# Patient Record
Sex: FEMALE | Race: WHITE | NOT HISPANIC OR LATINO | Employment: OTHER | URBAN - METROPOLITAN AREA
[De-identification: names, ages, dates, MRNs, and addresses within clinical notes are randomized per-mention and may not be internally consistent; named-entity substitution may affect disease eponyms.]

---

## 2018-01-18 ENCOUNTER — HOSPITAL ENCOUNTER (OUTPATIENT)
Dept: LAB | Facility: MEDICAL CENTER | Age: 78
End: 2018-01-18
Attending: FAMILY MEDICINE
Payer: COMMERCIAL

## 2018-01-18 LAB
ALBUMIN SERPL BCP-MCNC: 4.5 G/DL (ref 3.2–4.9)
ALBUMIN/GLOB SERPL: 1.3 G/DL
ALP SERPL-CCNC: 101 U/L (ref 30–99)
ALT SERPL-CCNC: 9 U/L (ref 2–50)
ANION GAP SERPL CALC-SCNC: 11 MMOL/L (ref 0–11.9)
AST SERPL-CCNC: 21 U/L (ref 12–45)
BASOPHILS # BLD AUTO: 0.5 % (ref 0–1.8)
BASOPHILS # BLD: 0.04 K/UL (ref 0–0.12)
BILIRUB SERPL-MCNC: 0.4 MG/DL (ref 0.1–1.5)
BUN SERPL-MCNC: 11 MG/DL (ref 8–22)
CALCIUM SERPL-MCNC: 8.9 MG/DL (ref 8.5–10.5)
CHLORIDE SERPL-SCNC: 103 MMOL/L (ref 96–112)
CHOLEST SERPL-MCNC: 298 MG/DL (ref 100–199)
CO2 SERPL-SCNC: 26 MMOL/L (ref 20–33)
CREAT SERPL-MCNC: 0.95 MG/DL (ref 0.5–1.4)
EOSINOPHIL # BLD AUTO: 0.35 K/UL (ref 0–0.51)
EOSINOPHIL NFR BLD: 4.8 % (ref 0–6.9)
ERYTHROCYTE [DISTWIDTH] IN BLOOD BY AUTOMATED COUNT: 42.7 FL (ref 35.9–50)
EST. AVERAGE GLUCOSE BLD GHB EST-MCNC: 108 MG/DL
GLOBULIN SER CALC-MCNC: 3.5 G/DL (ref 1.9–3.5)
GLUCOSE SERPL-MCNC: 126 MG/DL (ref 65–99)
HBA1C MFR BLD: 5.4 % (ref 0–5.6)
HCT VFR BLD AUTO: 39.8 % (ref 37–47)
HDLC SERPL-MCNC: 51 MG/DL
HGB BLD-MCNC: 13 G/DL (ref 12–16)
IMM GRANULOCYTES # BLD AUTO: 0.03 K/UL (ref 0–0.11)
IMM GRANULOCYTES NFR BLD AUTO: 0.4 % (ref 0–0.9)
LDLC SERPL CALC-MCNC: 217 MG/DL
LYMPHOCYTES # BLD AUTO: 1.65 K/UL (ref 1–4.8)
LYMPHOCYTES NFR BLD: 22.5 % (ref 22–41)
MCH RBC QN AUTO: 30.3 PG (ref 27–33)
MCHC RBC AUTO-ENTMCNC: 32.7 G/DL (ref 33.6–35)
MCV RBC AUTO: 92.8 FL (ref 81.4–97.8)
MONOCYTES # BLD AUTO: 0.63 K/UL (ref 0–0.85)
MONOCYTES NFR BLD AUTO: 8.6 % (ref 0–13.4)
NEUTROPHILS # BLD AUTO: 4.63 K/UL (ref 2–7.15)
NEUTROPHILS NFR BLD: 63.2 % (ref 44–72)
NRBC # BLD AUTO: 0 K/UL
NRBC BLD-RTO: 0 /100 WBC
PLATELET # BLD AUTO: 344 K/UL (ref 164–446)
PMV BLD AUTO: 9.1 FL (ref 9–12.9)
POTASSIUM SERPL-SCNC: 3.7 MMOL/L (ref 3.6–5.5)
PROT SERPL-MCNC: 8 G/DL (ref 6–8.2)
RBC # BLD AUTO: 4.29 M/UL (ref 4.2–5.4)
SODIUM SERPL-SCNC: 140 MMOL/L (ref 135–145)
TRIGL SERPL-MCNC: 152 MG/DL (ref 0–149)
TSH SERPL DL<=0.005 MIU/L-ACNC: 0.73 UIU/ML (ref 0.38–5.33)
WBC # BLD AUTO: 7.3 K/UL (ref 4.8–10.8)

## 2018-01-18 PROCEDURE — 80053 COMPREHEN METABOLIC PANEL: CPT

## 2018-01-18 PROCEDURE — 36415 COLL VENOUS BLD VENIPUNCTURE: CPT

## 2018-01-18 PROCEDURE — 85025 COMPLETE CBC W/AUTO DIFF WBC: CPT

## 2018-01-18 PROCEDURE — 84443 ASSAY THYROID STIM HORMONE: CPT

## 2018-01-18 PROCEDURE — 83036 HEMOGLOBIN GLYCOSYLATED A1C: CPT

## 2018-01-18 PROCEDURE — 80061 LIPID PANEL: CPT

## 2018-01-22 ENCOUNTER — OFFICE VISIT (OUTPATIENT)
Dept: URGENT CARE | Facility: CLINIC | Age: 78
End: 2018-01-22
Payer: COMMERCIAL

## 2018-01-22 ENCOUNTER — APPOINTMENT (OUTPATIENT)
Dept: RADIOLOGY | Facility: IMAGING CENTER | Age: 78
End: 2018-01-22
Attending: PHYSICIAN ASSISTANT
Payer: MEDICARE

## 2018-01-22 ENCOUNTER — HOSPITAL ENCOUNTER (INPATIENT)
Facility: MEDICAL CENTER | Age: 78
LOS: 12 days | DRG: 196 | End: 2018-02-03
Attending: EMERGENCY MEDICINE | Admitting: HOSPITALIST
Payer: COMMERCIAL

## 2018-01-22 ENCOUNTER — RESOLUTE PROFESSIONAL BILLING HOSPITAL PROF FEE (OUTPATIENT)
Dept: HOSPITALIST | Facility: MEDICAL CENTER | Age: 78
End: 2018-01-22
Payer: COMMERCIAL

## 2018-01-22 VITALS
BODY MASS INDEX: 26.87 KG/M2 | HEART RATE: 99 BPM | OXYGEN SATURATION: 90 % | TEMPERATURE: 98.3 F | SYSTOLIC BLOOD PRESSURE: 118 MMHG | DIASTOLIC BLOOD PRESSURE: 68 MMHG | WEIGHT: 146 LBS | HEIGHT: 62 IN | RESPIRATION RATE: 24 BRPM

## 2018-01-22 DIAGNOSIS — R06.2 WHEEZING: ICD-10-CM

## 2018-01-22 DIAGNOSIS — J18.9 PNEUMONIA OF BOTH LUNGS DUE TO INFECTIOUS ORGANISM, UNSPECIFIED PART OF LUNG: ICD-10-CM

## 2018-01-22 DIAGNOSIS — R06.02 SOB (SHORTNESS OF BREATH): ICD-10-CM

## 2018-01-22 DIAGNOSIS — M79.7 FIBROMYALGIA: Chronic | ICD-10-CM

## 2018-01-22 DIAGNOSIS — J96.01 ACUTE RESPIRATORY FAILURE WITH HYPOXIA (HCC): ICD-10-CM

## 2018-01-22 DIAGNOSIS — R05.9 COUGH: ICD-10-CM

## 2018-01-22 PROBLEM — E87.6 HYPOKALEMIA: Status: ACTIVE | Noted: 2018-01-22

## 2018-01-22 PROBLEM — D64.9 NORMOCHROMIC NORMOCYTIC ANEMIA: Status: ACTIVE | Noted: 2018-01-22

## 2018-01-22 LAB
ALBUMIN SERPL BCP-MCNC: 3.4 G/DL (ref 3.2–4.9)
ALBUMIN/GLOB SERPL: 0.9 G/DL
ALP SERPL-CCNC: 101 U/L (ref 30–99)
ALT SERPL-CCNC: 6 U/L (ref 2–50)
ANION GAP SERPL CALC-SCNC: 9 MMOL/L (ref 0–11.9)
APPEARANCE UR: ABNORMAL
AST SERPL-CCNC: 17 U/L (ref 12–45)
BACTERIA #/AREA URNS HPF: ABNORMAL /HPF
BASOPHILS # BLD AUTO: 0.3 % (ref 0–1.8)
BASOPHILS # BLD: 0.03 K/UL (ref 0–0.12)
BILIRUB SERPL-MCNC: 0.3 MG/DL (ref 0.1–1.5)
BILIRUB UR QL STRIP.AUTO: NEGATIVE
BNP SERPL-MCNC: 30 PG/ML (ref 0–100)
BUN SERPL-MCNC: 11 MG/DL (ref 8–22)
CALCIUM SERPL-MCNC: 8.9 MG/DL (ref 8.5–10.5)
CHLORIDE SERPL-SCNC: 104 MMOL/L (ref 96–112)
CO2 SERPL-SCNC: 26 MMOL/L (ref 20–33)
COLOR UR: YELLOW
CREAT SERPL-MCNC: 0.83 MG/DL (ref 0.5–1.4)
EKG IMPRESSION: NORMAL
EOSINOPHIL # BLD AUTO: 0.34 K/UL (ref 0–0.51)
EOSINOPHIL NFR BLD: 3.9 % (ref 0–6.9)
EPI CELLS #/AREA URNS HPF: NEGATIVE /HPF
ERYTHROCYTE [DISTWIDTH] IN BLOOD BY AUTOMATED COUNT: 41.5 FL (ref 35.9–50)
FLUAV RNA SPEC QL NAA+PROBE: NEGATIVE
FLUAV+FLUBV AG SPEC QL IA: NEGATIVE
FLUBV RNA SPEC QL NAA+PROBE: NEGATIVE
GLOBULIN SER CALC-MCNC: 3.9 G/DL (ref 1.9–3.5)
GLUCOSE SERPL-MCNC: 92 MG/DL (ref 65–99)
GLUCOSE UR STRIP.AUTO-MCNC: NEGATIVE MG/DL
HCT VFR BLD AUTO: 34.4 % (ref 37–47)
HGB BLD-MCNC: 11.3 G/DL (ref 12–16)
HYALINE CASTS #/AREA URNS LPF: ABNORMAL /LPF
IMM GRANULOCYTES # BLD AUTO: 0.02 K/UL (ref 0–0.11)
IMM GRANULOCYTES NFR BLD AUTO: 0.2 % (ref 0–0.9)
INT CON NEG: NEGATIVE
INT CON POS: POSITIVE
KETONES UR STRIP.AUTO-MCNC: NEGATIVE MG/DL
LACTATE BLD-SCNC: 1.3 MMOL/L (ref 0.5–2)
LEUKOCYTE ESTERASE UR QL STRIP.AUTO: ABNORMAL
LYMPHOCYTES # BLD AUTO: 1.17 K/UL (ref 1–4.8)
LYMPHOCYTES NFR BLD: 13.4 % (ref 22–41)
MAGNESIUM SERPL-MCNC: 1.8 MG/DL (ref 1.5–2.5)
MCH RBC QN AUTO: 30.1 PG (ref 27–33)
MCHC RBC AUTO-ENTMCNC: 32.8 G/DL (ref 33.6–35)
MCV RBC AUTO: 91.7 FL (ref 81.4–97.8)
MICRO URNS: ABNORMAL
MONOCYTES # BLD AUTO: 0.58 K/UL (ref 0–0.85)
MONOCYTES NFR BLD AUTO: 6.6 % (ref 0–13.4)
NEUTROPHILS # BLD AUTO: 6.61 K/UL (ref 2–7.15)
NEUTROPHILS NFR BLD: 75.6 % (ref 44–72)
NITRITE UR QL STRIP.AUTO: NEGATIVE
NRBC # BLD AUTO: 0 K/UL
NRBC BLD-RTO: 0 /100 WBC
PH UR STRIP.AUTO: 5 [PH]
PHOSPHATE SERPL-MCNC: 3 MG/DL (ref 2.5–4.5)
PLATELET # BLD AUTO: 304 K/UL (ref 164–446)
PMV BLD AUTO: 8.7 FL (ref 9–12.9)
POTASSIUM SERPL-SCNC: 3.4 MMOL/L (ref 3.6–5.5)
PROT SERPL-MCNC: 7.3 G/DL (ref 6–8.2)
PROT UR QL STRIP: NEGATIVE MG/DL
RBC # BLD AUTO: 3.75 M/UL (ref 4.2–5.4)
RBC # URNS HPF: ABNORMAL /HPF
RBC UR QL AUTO: NEGATIVE
SODIUM SERPL-SCNC: 139 MMOL/L (ref 135–145)
SP GR UR STRIP.AUTO: 1.02
TROPONIN I SERPL-MCNC: <0.01 NG/ML (ref 0–0.04)
UROBILINOGEN UR STRIP.AUTO-MCNC: 0.2 MG/DL
WBC # BLD AUTO: 8.8 K/UL (ref 4.8–10.8)
WBC #/AREA URNS HPF: ABNORMAL /HPF

## 2018-01-22 PROCEDURE — 96374 THER/PROPH/DIAG INJ IV PUSH: CPT

## 2018-01-22 PROCEDURE — 93005 ELECTROCARDIOGRAM TRACING: CPT | Performed by: EMERGENCY MEDICINE

## 2018-01-22 PROCEDURE — 304561 HCHG STAT O2

## 2018-01-22 PROCEDURE — 700111 HCHG RX REV CODE 636 W/ 250 OVERRIDE (IP): Performed by: EMERGENCY MEDICINE

## 2018-01-22 PROCEDURE — 770020 HCHG ROOM/CARE - TELE (206)

## 2018-01-22 PROCEDURE — 94640 AIRWAY INHALATION TREATMENT: CPT

## 2018-01-22 PROCEDURE — 83880 ASSAY OF NATRIURETIC PEPTIDE: CPT

## 2018-01-22 PROCEDURE — 87804 INFLUENZA ASSAY W/OPTIC: CPT | Performed by: PHYSICIAN ASSISTANT

## 2018-01-22 PROCEDURE — 700101 HCHG RX REV CODE 250: Performed by: EMERGENCY MEDICINE

## 2018-01-22 PROCEDURE — 36415 COLL VENOUS BLD VENIPUNCTURE: CPT

## 2018-01-22 PROCEDURE — 83605 ASSAY OF LACTIC ACID: CPT

## 2018-01-22 PROCEDURE — 80053 COMPREHEN METABOLIC PANEL: CPT

## 2018-01-22 PROCEDURE — 87502 INFLUENZA DNA AMP PROBE: CPT

## 2018-01-22 PROCEDURE — 700102 HCHG RX REV CODE 250 W/ 637 OVERRIDE(OP): Performed by: EMERGENCY MEDICINE

## 2018-01-22 PROCEDURE — 84100 ASSAY OF PHOSPHORUS: CPT

## 2018-01-22 PROCEDURE — 81001 URINALYSIS AUTO W/SCOPE: CPT

## 2018-01-22 PROCEDURE — 94640 AIRWAY INHALATION TREATMENT: CPT | Performed by: PHYSICIAN ASSISTANT

## 2018-01-22 PROCEDURE — 87086 URINE CULTURE/COLONY COUNT: CPT

## 2018-01-22 PROCEDURE — 99285 EMERGENCY DEPT VISIT HI MDM: CPT

## 2018-01-22 PROCEDURE — 71046 X-RAY EXAM CHEST 2 VIEWS: CPT | Mod: TC | Performed by: PHYSICIAN ASSISTANT

## 2018-01-22 PROCEDURE — 83735 ASSAY OF MAGNESIUM: CPT

## 2018-01-22 PROCEDURE — 99223 1ST HOSP IP/OBS HIGH 75: CPT | Performed by: HOSPITALIST

## 2018-01-22 PROCEDURE — A9270 NON-COVERED ITEM OR SERVICE: HCPCS | Performed by: EMERGENCY MEDICINE

## 2018-01-22 PROCEDURE — 700111 HCHG RX REV CODE 636 W/ 250 OVERRIDE (IP): Performed by: HOSPITALIST

## 2018-01-22 PROCEDURE — 93005 ELECTROCARDIOGRAM TRACING: CPT

## 2018-01-22 PROCEDURE — 700105 HCHG RX REV CODE 258: Performed by: HOSPITALIST

## 2018-01-22 PROCEDURE — 87040 BLOOD CULTURE FOR BACTERIA: CPT

## 2018-01-22 PROCEDURE — 84484 ASSAY OF TROPONIN QUANT: CPT

## 2018-01-22 PROCEDURE — 99215 OFFICE O/P EST HI 40 MIN: CPT | Mod: 25 | Performed by: PHYSICIAN ASSISTANT

## 2018-01-22 PROCEDURE — 700102 HCHG RX REV CODE 250 W/ 637 OVERRIDE(OP): Performed by: HOSPITALIST

## 2018-01-22 PROCEDURE — 85025 COMPLETE CBC W/AUTO DIFF WBC: CPT

## 2018-01-22 PROCEDURE — A9270 NON-COVERED ITEM OR SERVICE: HCPCS | Performed by: HOSPITALIST

## 2018-01-22 RX ORDER — AMITRIPTYLINE HYDROCHLORIDE 150 MG/1
150 TABLET ORAL
COMMUNITY

## 2018-01-22 RX ORDER — IPRATROPIUM BROMIDE AND ALBUTEROL SULFATE 2.5; .5 MG/3ML; MG/3ML
3 SOLUTION RESPIRATORY (INHALATION) ONCE
Status: COMPLETED | OUTPATIENT
Start: 2018-01-22 | End: 2018-01-22

## 2018-01-22 RX ORDER — AMITRIPTYLINE HYDROCHLORIDE 50 MG/1
150 TABLET, FILM COATED ORAL
Status: DISCONTINUED | OUTPATIENT
Start: 2018-01-22 | End: 2018-01-26

## 2018-01-22 RX ORDER — LISINOPRIL 5 MG/1
5 TABLET ORAL DAILY
Status: DISCONTINUED | OUTPATIENT
Start: 2018-01-23 | End: 2018-01-25

## 2018-01-22 RX ORDER — LEVOFLOXACIN 5 MG/ML
750 INJECTION, SOLUTION INTRAVENOUS
Status: DISCONTINUED | OUTPATIENT
Start: 2018-01-22 | End: 2018-01-23

## 2018-01-22 RX ORDER — METHYLPREDNISOLONE SODIUM SUCCINATE 125 MG/2ML
125 INJECTION, POWDER, LYOPHILIZED, FOR SOLUTION INTRAMUSCULAR; INTRAVENOUS ONCE
Status: COMPLETED | OUTPATIENT
Start: 2018-01-22 | End: 2018-01-22

## 2018-01-22 RX ORDER — AMOXICILLIN 250 MG
2 CAPSULE ORAL 2 TIMES DAILY
Status: DISCONTINUED | OUTPATIENT
Start: 2018-01-22 | End: 2018-02-03 | Stop reason: HOSPADM

## 2018-01-22 RX ORDER — LEVOTHYROXINE SODIUM 0.03 MG/1
25 TABLET ORAL
Status: DISCONTINUED | OUTPATIENT
Start: 2018-01-23 | End: 2018-02-03 | Stop reason: HOSPADM

## 2018-01-22 RX ORDER — PREGABALIN 150 MG/1
150 CAPSULE ORAL DAILY
COMMUNITY

## 2018-01-22 RX ORDER — FLUOXETINE HYDROCHLORIDE 20 MG/1
20 CAPSULE ORAL DAILY
Status: DISCONTINUED | OUTPATIENT
Start: 2018-01-23 | End: 2018-02-03 | Stop reason: HOSPADM

## 2018-01-22 RX ORDER — POLYETHYLENE GLYCOL 3350 17 G/17G
1 POWDER, FOR SOLUTION ORAL
Status: DISCONTINUED | OUTPATIENT
Start: 2018-01-22 | End: 2018-02-03 | Stop reason: HOSPADM

## 2018-01-22 RX ORDER — CARVEDILOL 25 MG/1
25 TABLET ORAL DAILY
Status: ON HOLD | COMMUNITY
End: 2018-02-03

## 2018-01-22 RX ORDER — ZOLPIDEM TARTRATE 5 MG/1
5 TABLET ORAL
Status: DISCONTINUED | OUTPATIENT
Start: 2018-01-22 | End: 2018-01-25

## 2018-01-22 RX ORDER — LEVOTHYROXINE SODIUM 0.03 MG/1
25 TABLET ORAL
COMMUNITY

## 2018-01-22 RX ORDER — ONDANSETRON 4 MG/1
4 TABLET, ORALLY DISINTEGRATING ORAL EVERY 4 HOURS PRN
Status: DISCONTINUED | OUTPATIENT
Start: 2018-01-22 | End: 2018-02-03 | Stop reason: HOSPADM

## 2018-01-22 RX ORDER — CARVEDILOL 25 MG/1
25 TABLET ORAL DAILY
Status: DISCONTINUED | OUTPATIENT
Start: 2018-01-23 | End: 2018-01-25

## 2018-01-22 RX ORDER — IPRATROPIUM BROMIDE AND ALBUTEROL SULFATE 2.5; .5 MG/3ML; MG/3ML
3 SOLUTION RESPIRATORY (INHALATION)
Status: COMPLETED | OUTPATIENT
Start: 2018-01-22 | End: 2018-01-22

## 2018-01-22 RX ORDER — SODIUM CHLORIDE 9 MG/ML
INJECTION, SOLUTION INTRAVENOUS CONTINUOUS
Status: DISCONTINUED | OUTPATIENT
Start: 2018-01-22 | End: 2018-01-24

## 2018-01-22 RX ORDER — ONDANSETRON 2 MG/ML
4 INJECTION INTRAMUSCULAR; INTRAVENOUS EVERY 4 HOURS PRN
Status: DISCONTINUED | OUTPATIENT
Start: 2018-01-22 | End: 2018-02-03 | Stop reason: HOSPADM

## 2018-01-22 RX ORDER — OXYCODONE AND ACETAMINOPHEN 10; 325 MG/1; MG/1
1 TABLET ORAL ONCE
Status: COMPLETED | OUTPATIENT
Start: 2018-01-22 | End: 2018-01-22

## 2018-01-22 RX ORDER — ACETAMINOPHEN 325 MG/1
650 TABLET ORAL EVERY 6 HOURS PRN
Status: DISCONTINUED | OUTPATIENT
Start: 2018-01-22 | End: 2018-02-03 | Stop reason: HOSPADM

## 2018-01-22 RX ORDER — OXYCODONE AND ACETAMINOPHEN 10; 325 MG/1; MG/1
1-2 TABLET ORAL EVERY 4 HOURS PRN
Status: DISCONTINUED | OUTPATIENT
Start: 2018-01-22 | End: 2018-01-25

## 2018-01-22 RX ORDER — HYDROCHLOROTHIAZIDE 25 MG/1
25 TABLET ORAL DAILY
Status: DISCONTINUED | OUTPATIENT
Start: 2018-01-23 | End: 2018-01-25

## 2018-01-22 RX ORDER — BISACODYL 10 MG
10 SUPPOSITORY, RECTAL RECTAL
Status: DISCONTINUED | OUTPATIENT
Start: 2018-01-22 | End: 2018-02-03 | Stop reason: HOSPADM

## 2018-01-22 RX ORDER — LABETALOL HYDROCHLORIDE 5 MG/ML
10 INJECTION, SOLUTION INTRAVENOUS EVERY 4 HOURS PRN
Status: DISCONTINUED | OUTPATIENT
Start: 2018-01-22 | End: 2018-02-03 | Stop reason: HOSPADM

## 2018-01-22 RX ORDER — POTASSIUM CHLORIDE 20 MEQ/1
20 TABLET, EXTENDED RELEASE ORAL 2 TIMES DAILY
Status: DISCONTINUED | OUTPATIENT
Start: 2018-01-22 | End: 2018-02-03 | Stop reason: HOSPADM

## 2018-01-22 RX ORDER — PREGABALIN 150 MG/1
150 CAPSULE ORAL EVERY EVENING
Status: DISCONTINUED | OUTPATIENT
Start: 2018-01-22 | End: 2018-02-03 | Stop reason: HOSPADM

## 2018-01-22 RX ADMIN — STANDARDIZED SENNA CONCENTRATE AND DOCUSATE SODIUM 2 TABLET: 8.6; 5 TABLET, FILM COATED ORAL at 21:59

## 2018-01-22 RX ADMIN — OXYCODONE HYDROCHLORIDE AND ACETAMINOPHEN 1 TABLET: 10; 325 TABLET ORAL at 17:50

## 2018-01-22 RX ADMIN — SODIUM CHLORIDE: 9 INJECTION, SOLUTION INTRAVENOUS at 22:01

## 2018-01-22 RX ADMIN — ZOLPIDEM TARTRATE 5 MG: 5 TABLET, FILM COATED ORAL at 23:07

## 2018-01-22 RX ADMIN — POTASSIUM CHLORIDE 20 MEQ: 1500 TABLET, EXTENDED RELEASE ORAL at 21:59

## 2018-01-22 RX ADMIN — IPRATROPIUM BROMIDE AND ALBUTEROL SULFATE 3 ML: .5; 3 SOLUTION RESPIRATORY (INHALATION) at 17:05

## 2018-01-22 RX ADMIN — PREGABALIN 150 MG: 150 CAPSULE ORAL at 21:58

## 2018-01-22 RX ADMIN — ACETAMINOPHEN 650 MG: 325 TABLET, FILM COATED ORAL at 23:07

## 2018-01-22 RX ADMIN — IPRATROPIUM BROMIDE AND ALBUTEROL SULFATE 3 ML: 2.5; .5 SOLUTION RESPIRATORY (INHALATION) at 12:56

## 2018-01-22 RX ADMIN — METHYLPREDNISOLONE SODIUM SUCCINATE 125 MG: 125 INJECTION, POWDER, FOR SOLUTION INTRAMUSCULAR; INTRAVENOUS at 17:52

## 2018-01-22 RX ADMIN — AMITRIPTYLINE HYDROCHLORIDE 150 MG: 50 TABLET, FILM COATED ORAL at 22:29

## 2018-01-22 RX ADMIN — LEVOFLOXACIN 750 MG: 750 INJECTION, SOLUTION INTRAVENOUS at 23:06

## 2018-01-22 ASSESSMENT — ENCOUNTER SYMPTOMS
DOUBLE VISION: 0
CARDIOVASCULAR NEGATIVE: 1
VOMITING: 0
SPUTUM PRODUCTION: 0
WEAKNESS: 1
EYE REDNESS: 0
CONSTIPATION: 0
SHORTNESS OF BREATH: 1
SORE THROAT: 0
COUGH: 1
HEADACHES: 0
HEADACHES: 0
NECK PAIN: 0
CHILLS: 1
MYALGIAS: 1
BLOOD IN STOOL: 0
WHEEZING: 0
ABDOMINAL PAIN: 0
TINGLING: 0
RHINORRHEA: 0
SHORTNESS OF BREATH: 1
HEARTBURN: 0
DIZZINESS: 0
COUGH: 1
HEMOPTYSIS: 0
MYALGIAS: 1
BACK PAIN: 1
WHEEZING: 1
NAUSEA: 0
GASTROINTESTINAL NEGATIVE: 1
EYES NEGATIVE: 1
NERVOUS/ANXIOUS: 0
PALPITATIONS: 0
PSYCHIATRIC NEGATIVE: 1
ABDOMINAL PAIN: 0
FALLS: 0
FEVER: 0
FOCAL WEAKNESS: 0
DIAPHORESIS: 0
VOMITING: 0
LOSS OF CONSCIOUSNESS: 0
SEIZURES: 0
DIARRHEA: 0
CHILLS: 1
DIARRHEA: 0
FEVER: 0
EYE DISCHARGE: 0
BRUISES/BLEEDS EASILY: 0
DIZZINESS: 0

## 2018-01-22 ASSESSMENT — COPD QUESTIONNAIRES
HAVE YOU SMOKED AT LEAST 100 CIGARETTES IN YOUR ENTIRE LIFE: NO/DON'T KNOW
DURING THE PAST 4 WEEKS HOW MUCH DID YOU FEEL SHORT OF BREATH: NONE/LITTLE OF THE TIME
COPD: 0
DO YOU EVER COUGH UP ANY MUCUS OR PHLEGM?: NO/ONLY WITH OCCASIONAL COLDS OR INFECTIONS
HAVE YOU SMOKED AT LEAST 100 CIGARETTES IN YOUR ENTIRE LIFE: NO/DON'T KNOW
DURING THE PAST 4 WEEKS HOW MUCH DID YOU FEEL SHORT OF BREATH: SOME OF THE TIME
COPD SCREENING SCORE: 3
COPD SCREENING SCORE: 2
DO YOU EVER COUGH UP ANY MUCUS OR PHLEGM?: NO/ONLY WITH OCCASIONAL COLDS OR INFECTIONS

## 2018-01-22 ASSESSMENT — PATIENT HEALTH QUESTIONNAIRE - PHQ9
4. FEELING TIRED OR HAVING LITTLE ENERGY: NEARLY EVERY DAY
8. MOVING OR SPEAKING SO SLOWLY THAT OTHER PEOPLE COULD HAVE NOTICED. OR THE OPPOSITE, BEING SO FIGETY OR RESTLESS THAT YOU HAVE BEEN MOVING AROUND A LOT MORE THAN USUAL: NOT AT ALL
SUM OF ALL RESPONSES TO PHQ QUESTIONS 1-9: 7
3. TROUBLE FALLING OR STAYING ASLEEP OR SLEEPING TOO MUCH: SEVERAL DAYS
2. FEELING DOWN, DEPRESSED, IRRITABLE, OR HOPELESS: MORE THAN HALF THE DAYS
6. FEELING BAD ABOUT YOURSELF - OR THAT YOU ARE A FAILURE OR HAVE LET YOURSELF OR YOUR FAMILY DOWN: NOT AL ALL
7. TROUBLE CONCENTRATING ON THINGS, SUCH AS READING THE NEWSPAPER OR WATCHING TELEVISION: NOT AT ALL
SUM OF ALL RESPONSES TO PHQ9 QUESTIONS 1 AND 2: 2
5. POOR APPETITE OR OVEREATING: SEVERAL DAYS
9. THOUGHTS THAT YOU WOULD BE BETTER OFF DEAD, OR OF HURTING YOURSELF: NOT AT ALL
1. LITTLE INTEREST OR PLEASURE IN DOING THINGS: NOT AT ALL

## 2018-01-22 ASSESSMENT — LIFESTYLE VARIABLES
ALCOHOL_USE: NO
EVER_SMOKED: NEVER

## 2018-01-22 ASSESSMENT — PAIN SCALES - GENERAL
PAINLEVEL_OUTOF10: 9
PAINLEVEL_OUTOF10: 0

## 2018-01-22 NOTE — ED NOTES
Pt presents to ED from  for c/o a cough. Per pt they think she has pneumonia on both sides. Pt reports a cough with pain since last night.

## 2018-01-22 NOTE — ED PROVIDER NOTES
"ED Provider Note    Scribed for Jhon Barber M.D. by Jenniffer Campos. 1/22/2018  3:55 PM    Primary care provider: Paramjit Akbar M.D.  Means of arrival: walk in   History obtained from: patient   History limited by: none     CHIEF COMPLAINT  Chief Complaint   Patient presents with   • Chest Pressure   • Sent from Urgent Care       HPI  Ade Hermosillo is a 77 y.o. female who presents to the Emergency Department with complaints of worsening shortness of breath onset 2 weeks ago. Patient reports associated cough onset last night, wheezing, and chest pain. Her chest pain is located in her epigastric area. She describes the quantity of her chest pain as \"burning in her lungs\". Her pain is exacerbated by taking deep breaths. Patient denies abdominal pain and leg pain or swelling. She denies a history of DVT or PE's. She denies a history of cancers. She does not remember whether or not she has received her flu shot this season. Patient reports that she presented to the urgent care last night secondary to worsening shortness of breath. Patient reports receiving a breathing treatment with relief minimal of her symptoms. Patient was sent to the ER from urgent care for further evaluation of her symptoms. She denies any other pertinent medical history.     REVIEW OF SYSTEMS  Pertinent positives include shortness of breath, chest pain, wheezing, and cough. Pertinent negatives include no abdominal pain, leg pain or swelling.  All other systems reviewed and negative. C    PAST MEDICAL HISTORY   has a past medical history of Anxiety; Hypertension; and Thyroid disease.    SURGICAL HISTORY   has a past surgical history that includes abdominal hysterectomy total; appendectomy; and hysterectomy laparoscopy.    SOCIAL HISTORY  Social History   Substance Use Topics   • Smoking status: Never Smoker   • Smokeless tobacco: Never Used   • Alcohol use No      History   Drug Use No       FAMILY HISTORY  Family History   Problem " Relation Age of Onset   • Cancer Mother    • Alcohol/Drug Father        CURRENT MEDICATIONS  Home Medications     Reviewed by Tami Alonzo (Pharmacy Tech) on 01/22/18 at 1921  Med List Status: Complete   Medication Last Dose Status   amitriptyline (ELAVIL) 150 MG Tab 1/21/2018 Active   carvedilol (COREG) 25 MG Tab 1/22/2018 Active   fluoxetine (PROZAC) 20 MG CAPS 1/22/2018 Active   hydrochlorothiazide (HYDRODIURIL) 25 MG TABS 1/22/2018 Active   levothyroxine (SYNTHROID) 25 MCG Tab 1/22/2018 Active   lisinopril (PRINIVIL) 5 MG TABS 1/22/2018 Active   Oxycodone-Acetaminophen (PERCOCET-10)  MG Tab 1/22/2018 Active   pregabalin (LYRICA) 150 MG Cap 1/21/2018 Active                ALLERGIES  Allergies   Allergen Reactions   • Amoxicillin Anaphylaxis     Stop breathing   • Pcn [Penicillins] Anaphylaxis     Stop breathing       PHYSICAL EXAM  VITAL SIGNS: /66   Pulse 96   Temp 37.9 °C (100.3 °F)   Resp 16   Wt 63.5 kg (140 lb)   SpO2 98%   BMI 25.61 kg/m²     Constitutional: Well developed, Well nourished, Non-toxic appearance.   HENT: Normocephalic, Atraumatic, Bilateral external ears normal, Oropharynx moist, No oral exudates.   Eyes: PERRLA, EOMI, Conjunctiva normal, No discharge.   Neck: No tenderness, Supple, No stridor.   Lymphatic: No lymphadenopathy noted.   Cardiovascular: Normal heart rate, Normal rhythm.   Thorax & Lungs: Diffuse crackles in bilateral bases.   Abdomen: Soft, No tenderness, No masses, No pulsatile masses.   Skin: Warm, Dry, No erythema, No rash.   Extremities:, No edema No cyanosis.   Musculoskeletal: No tenderness to palpation or major deformities noted. Intact distal pulses  Neurologic: Awake, alert. Moves all extremities spontaneously.  Psychiatric: Affect normal, Judgment normal, Mood normal.     LABS  Results for orders placed or performed during the hospital encounter of 01/22/18   LACTIC ACID   Result Value Ref Range    Lactic Acid 1.3 0.5 - 2.0 mmol/L   CBC WITH  DIFFERENTIAL   Result Value Ref Range    WBC 8.8 4.8 - 10.8 K/uL    RBC 3.75 (L) 4.20 - 5.40 M/uL    Hemoglobin 11.3 (L) 12.0 - 16.0 g/dL    Hematocrit 34.4 (L) 37.0 - 47.0 %    MCV 91.7 81.4 - 97.8 fL    MCH 30.1 27.0 - 33.0 pg    MCHC 32.8 (L) 33.6 - 35.0 g/dL    RDW 41.5 35.9 - 50.0 fL    Platelet Count 304 164 - 446 K/uL    MPV 8.7 (L) 9.0 - 12.9 fL    Neutrophils-Polys 75.60 (H) 44.00 - 72.00 %    Lymphocytes 13.40 (L) 22.00 - 41.00 %    Monocytes 6.60 0.00 - 13.40 %    Eosinophils 3.90 0.00 - 6.90 %    Basophils 0.30 0.00 - 1.80 %    Immature Granulocytes 0.20 0.00 - 0.90 %    Nucleated RBC 0.00 /100 WBC    Neutrophils (Absolute) 6.61 2.00 - 7.15 K/uL    Lymphs (Absolute) 1.17 1.00 - 4.80 K/uL    Monos (Absolute) 0.58 0.00 - 0.85 K/uL    Eos (Absolute) 0.34 0.00 - 0.51 K/uL    Baso (Absolute) 0.03 0.00 - 0.12 K/uL    Immature Granulocytes (abs) 0.02 0.00 - 0.11 K/uL    NRBC (Absolute) 0.00 K/uL   COMP METABOLIC PANEL   Result Value Ref Range    Sodium 139 135 - 145 mmol/L    Potassium 3.4 (L) 3.6 - 5.5 mmol/L    Chloride 104 96 - 112 mmol/L    Co2 26 20 - 33 mmol/L    Anion Gap 9.0 0.0 - 11.9    Glucose 92 65 - 99 mg/dL    Bun 11 8 - 22 mg/dL    Creatinine 0.83 0.50 - 1.40 mg/dL    Calcium 8.9 8.5 - 10.5 mg/dL    AST(SGOT) 17 12 - 45 U/L    ALT(SGPT) 6 2 - 50 U/L    Alkaline Phosphatase 101 (H) 30 - 99 U/L    Total Bilirubin 0.3 0.1 - 1.5 mg/dL    Albumin 3.4 3.2 - 4.9 g/dL    Total Protein 7.3 6.0 - 8.2 g/dL    Globulin 3.9 (H) 1.9 - 3.5 g/dL    A-G Ratio 0.9 g/dL   TROPONIN   Result Value Ref Range    Troponin I <0.01 0.00 - 0.04 ng/mL   BTYPE NATRIURETIC PEPTIDE   Result Value Ref Range    B Natriuretic Peptide 30 0 - 100 pg/mL   MAGNESIUM   Result Value Ref Range    Magnesium 1.8 1.5 - 2.5 mg/dL   PHOSPHORUS   Result Value Ref Range    Phosphorus 3.0 2.5 - 4.5 mg/dL   ESTIMATED GFR   Result Value Ref Range    GFR If African American >60 >60 mL/min/1.73 m 2    GFR If Non African American >60 >60 mL/min/1.73  m 2   INFLUENZA A/B BY PCR   Result Value Ref Range    Influenza virus A RNA Negative Negative    Influenza virus B, PCR Negative Negative   EKG (NOW)   Result Value Ref Range    Report       [facility] Emergency Dept.    Test Date:  2018  Pt Name:    JENELLE CASTRO                 Department: ER  MRN:        [id number]                      Room:  Gender:     Female                       Technician: [id number]  :        [id number]                   Requested By:ER TRIAGE PROTOCOL  Order #:    [id number]                    Reading MD:    Measurements  Intervals                                Axis  Rate:       91                           P:          23  ME:         172                          QRS:        -38  QRSD:       98                           T:          149  QT:         359  QTc:        442    Interpretive Statements  SINUS RHYTHM  LEFT AXIS DEVIATION  LATE PRECORDIAL R/S TRANSITION  LVH WITH SECONDARY REPOLARIZATION ABNORMALITY  No previous ECG available for comparison       All labs reviewed by me.    EKG  Reviewed by me as shown above.     COURSE & MEDICAL DECISION MAKING  Pertinent Labs & Imaging studies reviewed. (See chart for details)    I reviewed the patient's medical records which showed the patient went to urgent care today for evaluation of a cough and shortness of breath. Her O2 saturations were 90%. There were wheezes throughout on exam. Chest x-ray showed bilateral pneumonia.     3:55 PM - Patient seen and examined at bedside. Patient will be treated with Solu-Medrol 125 mg, Duoneb 3 ml. Ordered EKG, lactic acid, estimated GFR, BNP, magnesium, phosphorus, influenza rapid, by PCR, A/B, oxygen therapy, cardiac monitoring, CBC, CMP, urinalysis, urine culture, blood culture to evaluate her symptoms. The differential diagnoses include but are not limited to: pneumonia, influenza, bronchitis, CHF. Discussed the patient's chest x-ray results and informed her that she most likely has  bilateral pneumonia. Informed her that she needs to be admitted to the hospital for further observation. She understands and agrees to the plan of care.     6:02 PM- Reviewed the patient's lab results.     6:17 PM- Spoke with Dr. Alan (hospitalist) who agrees to admit the patient.     Decision Making:  Patient with URI Symptoms, diffuse wheezing, x-ray that shows bilateral pneumonia. The patient's influenza was negative, given the patient breathing treatment, steroids, antibiotics, discussed the case with the hospitalist for admission to hospital.    DISPOSITION:  Patient will be admitted to Dr. Alan (hospitalist) in guarded condition.    FINAL IMPRESSION  1. Pneumonia of both lungs due to infectious organism, unspecified part of lung    2. Wheezing          IJenniffer (Scribe), am scribing for, and in the presence of, Jhon Barber M.D..    Electronically signed by: Jenniffer Campos (Scribe), 1/22/2018    IJhon M.D. personally performed the services described in this documentation, as scribed by Jenniffer Campos in my presence, and it is both accurate and complete.    The note accurately reflects work and decisions made by me.  Jhon Barber  1/22/2018  7:50 PM

## 2018-01-22 NOTE — PROGRESS NOTES
Subjective:      Ade Hermosillo is a 77 y.o. female who presents with Cough (2 days); Shortness of Breath; and Chest Pressure            Patient is a 77-year-old female who presents with dry cough, shortness of breath and wheezing for the last 3 days. Patient became concerned last night when she started having chest tightness and body aches all over. She denies any fevers however chronically feels chilled. She denies any history of prior lung problems of asthma or COPD.      Cough   This is a new problem. Episode onset: 3 days ago. The problem has been gradually worsening. The problem occurs every few minutes. The cough is non-productive. Associated symptoms include chills, myalgias, shortness of breath and wheezing. Pertinent negatives include no chest pain, ear pain, eye redness, fever, headaches, nasal congestion, postnasal drip, rash, rhinorrhea or sore throat. Nothing aggravates the symptoms. She has tried nothing for the symptoms. There is no history of asthma, bronchitis, COPD or pneumonia.   Shortness of Breath   Associated symptoms include wheezing. Pertinent negatives include no abdominal pain, chest pain, ear pain, fever, headaches, leg swelling, neck pain, rash, rhinorrhea, sore throat, sputum production or vomiting. There is no history of asthma, COPD or pneumonia.       Review of Systems   Constitutional: Positive for chills and malaise/fatigue. Negative for fever.   HENT: Negative for congestion, ear discharge, ear pain, postnasal drip, rhinorrhea and sore throat.    Eyes: Negative for discharge and redness.   Respiratory: Positive for cough, shortness of breath and wheezing. Negative for sputum production.         Positive for chest tightness   Cardiovascular: Negative for chest pain and leg swelling.   Gastrointestinal: Negative for abdominal pain, diarrhea and vomiting.   Genitourinary: Negative for dysuria and urgency.   Musculoskeletal: Positive for back pain and myalgias. Negative for falls  "and neck pain.   Skin: Negative for itching and rash.   Neurological: Negative for dizziness, tingling and headaches.          Objective:     /68   Pulse 99   Temp 36.8 °C (98.3 °F)   Resp (!) 24   Ht 1.575 m (5' 2\")   Wt 66.2 kg (146 lb)   SpO2 90%   BMI 26.70 kg/m²    PMH:  has a past medical history of Anxiety; Hypertension; and Thyroid disease.  MEDS:   Current Outpatient Prescriptions:   •  Oxycodone-Acetaminophen (PERCOCET-10)  MG Tab, Take 1-2 Tabs by mouth every four hours as needed for Severe Pain., Disp: , Rfl:   •  levothyroxine (SYNTHROID) 25 MCG TABS, Take 1 Tab by mouth every day., Disp: 90 Tab, Rfl: 3  •  lisinopril (PRINIVIL) 5 MG TABS, Take 1 Tab by mouth every day., Disp: 90 Tab, Rfl: 3  •  Pregabalin (LYRICA PO), Take  by mouth.  , Disp: , Rfl:   •  MethylPREDNISolone (MEDROL DOSEPAK) 4 MG Tablet Therapy Pack, U.D. [start in 2 days], Disp: 1 Tab, Rfl: 0  •  aspirin (ASA) 325 MG Tab, 1 PO in Urg Care, Disp: 1 Tab, Rfl: 0  •  AMITRIPTYLINE HCL PO, Take  by mouth., Disp: , Rfl:   •  methylPREDNISolone (MEDROL) 4 MG Tab, Take 4 mg by mouth every day., Disp: , Rfl:   •  azithromycin (ZITHROMAX) 250 MG TABS, 1 po qd, Disp: 10 Tab, Rfl: 0  •  carvedilol (COREG) 25 MG TABS, Take 1 Tab by mouth 2 times a day, with meals., Disp: 180 Tab, Rfl: 3  •  hydrochlorothiazide (HYDRODIURIL) 25 MG TABS, Take 1 Tab by mouth every day., Disp: 90 Tab, Rfl: 3  •  pravastatin (PRAVACHOL) 40 MG tablet, Take 1 Tab by mouth every day., Disp: 30 Tab, Rfl: 11  •  oxcarbazepine (TRILEPTAL) 150 MG TABS, Take 150 mg by mouth 2 times a day.  , Disp: , Rfl:   •  fluoxetine (PROZAC) 20 MG CAPS, Take 20 mg by mouth every day.  , Disp: , Rfl:   •  azithromycin (ZITHROMAX) 250 MG TABS, 2 tabs by mouth day 1, 1 tab by mouth days 2-5, Disp: 6 Each, Rfl: 0  •  fluticasone (FLONASE) 50 MCG/ACT nasal spray, Spray 2 Sprays in nose every day. Each Nostril, Disp: 1 Bottle, Rfl: 0  •  HYDROCODONE-ACETAMINOPHEN PO, Take  by " mouth.  , Disp: , Rfl:   •  Diclofenac Sodium (VOLTAREN) 1 % GEL, Apply 1 g to skin as directed every day at 6 PM., Disp: 100 g, Rfl: 6  ALLERGIES:   Allergies   Allergen Reactions   • Amoxicillin Anaphylaxis     Stop breathing   • Pcn [Penicillins] Anaphylaxis     Stop breathing     SURGHX:   Past Surgical History:   Procedure Laterality Date   • ABDOMINAL HYSTERECTOMY TOTAL     • APPENDECTOMY       SOCHX:  reports that she has never smoked. She has never used smokeless tobacco. She reports that she does not drink alcohol or use drugs.  FH: Family history was reviewed, no pertinent findings to report    Physical Exam   Constitutional: She is oriented to person, place, and time. She appears well-developed and well-nourished.   HENT:   Head: Normocephalic and atraumatic.   Right Ear: External ear normal.   Left Ear: External ear normal.   Nose: Nose normal.   Mouth/Throat: No oropharyngeal exudate.   Eyes: EOM are normal. Pupils are equal, round, and reactive to light.   Neck: Normal range of motion. Neck supple.   Cardiovascular: Tachycardia present.    Pulmonary/Chest: No accessory muscle usage. She has wheezes.   Shallow breathing. Speaking in few word sentences.    Neurological: She is alert and oriented to person, place, and time.   Skin: Skin is warm. No erythema.   Psychiatric: She has a normal mood and affect. Her behavior is normal. Judgment and thought content normal.   Vitals reviewed.         CXR:     Scattered poorly defined opacifications are identified throughout both lungs. Findings could be due to pneumonia. Other inflammatory infectious or infiltrative process is also possible.    POC influenza- negative.       Assessment/Plan:     1. Pneumonia of both lungs due to infectious organism, unspecified part of lung  2. SOB (shortness of breath)  - POCT Influenza A/B  - ipratropium-albuterol (DUONEB) nebulizer solution 3 mL; 3 mL by Nebulization route Once.    3. Cough  - DX-CHEST-2 VIEWS; Future  - POCT  Influenza A/B    This time patient is with minimal improvement on DuoNeb and still with significant shortness of breath. Patient was placed on 3 L of oxygen which this did help with her breathing. At this time I do not not feel that this patient will continued to well in outpatient setting from here-  As patient does live by herself and does not have any support.   Patient is agreeable to go to the emergency room for further evaluation however she refuses transport. She will have her friend pick her up and drive her to the emergency room- pt. Did leave on 3 L of O2- POX 96%.   At this time I do feel that this patient needs higher level of care. Patient was stable throughout the duration of her care today.  Patient and her friend are agreeable to go to the ER however area not certain which one they will go to. STRONGLY encouraged [facility]'s ER as they will have our information.

## 2018-01-22 NOTE — ED NOTES
Pt ambulates to triage with friend, sent to ER by Urgent Care for chest pressure & SOB.  Pt given nebulized breathing treatments at urgent care.  A&ox4.  Pt to [facility] & advised to inform RN of any changes.

## 2018-01-22 NOTE — ED NOTES
Pt states she is not normally on 02 at home. Pt was sent over on 3L 02 via [place] from . Pt reduced to 1L via [place]. Sp02 maintaining 97%.

## 2018-01-23 ENCOUNTER — APPOINTMENT (OUTPATIENT)
Dept: RADIOLOGY | Facility: MEDICAL CENTER | Age: 78
DRG: 196 | End: 2018-01-23
Attending: HOSPITALIST
Payer: COMMERCIAL

## 2018-01-23 LAB
ANION GAP SERPL CALC-SCNC: 10 MMOL/L (ref 0–11.9)
BUN SERPL-MCNC: 13 MG/DL (ref 8–22)
CALCIUM SERPL-MCNC: 8.5 MG/DL (ref 8.5–10.5)
CHLORIDE SERPL-SCNC: 100 MMOL/L (ref 96–112)
CO2 SERPL-SCNC: 22 MMOL/L (ref 20–33)
CREAT SERPL-MCNC: 0.77 MG/DL (ref 0.5–1.4)
ERYTHROCYTE [DISTWIDTH] IN BLOOD BY AUTOMATED COUNT: 40.8 FL (ref 35.9–50)
GLUCOSE SERPL-MCNC: 178 MG/DL (ref 65–99)
HCT VFR BLD AUTO: 32.3 % (ref 37–47)
HGB BLD-MCNC: 10.8 G/DL (ref 12–16)
MCH RBC QN AUTO: 30.4 PG (ref 27–33)
MCHC RBC AUTO-ENTMCNC: 33.4 G/DL (ref 33.6–35)
MCV RBC AUTO: 91 FL (ref 81.4–97.8)
PLATELET # BLD AUTO: 319 K/UL (ref 164–446)
PMV BLD AUTO: 8.8 FL (ref 9–12.9)
POTASSIUM SERPL-SCNC: 4.2 MMOL/L (ref 3.6–5.5)
PROCALCITONIN SERPL-MCNC: 0.06 NG/ML
RBC # BLD AUTO: 3.55 M/UL (ref 4.2–5.4)
SODIUM SERPL-SCNC: 132 MMOL/L (ref 135–145)
WBC # BLD AUTO: 7 K/UL (ref 4.8–10.8)

## 2018-01-23 PROCEDURE — A9270 NON-COVERED ITEM OR SERVICE: HCPCS | Performed by: HOSPITALIST

## 2018-01-23 PROCEDURE — 770020 HCHG ROOM/CARE - TELE (206)

## 2018-01-23 PROCEDURE — 85027 COMPLETE CBC AUTOMATED: CPT

## 2018-01-23 PROCEDURE — 36415 COLL VENOUS BLD VENIPUNCTURE: CPT

## 2018-01-23 PROCEDURE — 84145 PROCALCITONIN (PCT): CPT

## 2018-01-23 PROCEDURE — 700111 HCHG RX REV CODE 636 W/ 250 OVERRIDE (IP): Performed by: HOSPITALIST

## 2018-01-23 PROCEDURE — 90471 IMMUNIZATION ADMIN: CPT

## 2018-01-23 PROCEDURE — 80048 BASIC METABOLIC PNL TOTAL CA: CPT

## 2018-01-23 PROCEDURE — 3E0234Z INTRODUCTION OF SERUM, TOXOID AND VACCINE INTO MUSCLE, PERCUTANEOUS APPROACH: ICD-10-PCS | Performed by: HOSPITALIST

## 2018-01-23 PROCEDURE — 700102 HCHG RX REV CODE 250 W/ 637 OVERRIDE(OP): Performed by: HOSPITALIST

## 2018-01-23 PROCEDURE — 90670 PCV13 VACCINE IM: CPT | Performed by: HOSPITALIST

## 2018-01-23 PROCEDURE — 90662 IIV NO PRSV INCREASED AG IM: CPT | Performed by: HOSPITALIST

## 2018-01-23 PROCEDURE — 71045 X-RAY EXAM CHEST 1 VIEW: CPT

## 2018-01-23 PROCEDURE — 99233 SBSQ HOSP IP/OBS HIGH 50: CPT | Performed by: HOSPITALIST

## 2018-01-23 RX ORDER — LEVOFLOXACIN 5 MG/ML
750 INJECTION, SOLUTION INTRAVENOUS EVERY 24 HOURS
Status: DISCONTINUED | OUTPATIENT
Start: 2018-01-23 | End: 2018-01-24

## 2018-01-23 RX ADMIN — OXYCODONE HYDROCHLORIDE AND ACETAMINOPHEN 1 TABLET: 10; 325 TABLET ORAL at 12:51

## 2018-01-23 RX ADMIN — STANDARDIZED SENNA CONCENTRATE AND DOCUSATE SODIUM 2 TABLET: 8.6; 5 TABLET, FILM COATED ORAL at 08:36

## 2018-01-23 RX ADMIN — OXYCODONE HYDROCHLORIDE AND ACETAMINOPHEN 1 TABLET: 10; 325 TABLET ORAL at 17:57

## 2018-01-23 RX ADMIN — CARVEDILOL 25 MG: 25 TABLET, FILM COATED ORAL at 08:35

## 2018-01-23 RX ADMIN — LISINOPRIL 5 MG: 5 TABLET ORAL at 08:35

## 2018-01-23 RX ADMIN — AMITRIPTYLINE HYDROCHLORIDE 150 MG: 50 TABLET, FILM COATED ORAL at 20:58

## 2018-01-23 RX ADMIN — PREGABALIN 150 MG: 150 CAPSULE ORAL at 20:58

## 2018-01-23 RX ADMIN — POTASSIUM CHLORIDE 20 MEQ: 1500 TABLET, EXTENDED RELEASE ORAL at 08:36

## 2018-01-23 RX ADMIN — POTASSIUM CHLORIDE 20 MEQ: 1500 TABLET, EXTENDED RELEASE ORAL at 20:58

## 2018-01-23 RX ADMIN — GUAIFENESIN 200 MG: 100 SOLUTION ORAL at 23:49

## 2018-01-23 RX ADMIN — INFLUENZA A VIRUSA/MICHIGAN/45/2015 X-275 (H1N1) ANTIGEN (FORMALDEHYDE INACTIVATED), INFLUENZA A VIRUS A/HONG KONG/4801/2014 X-263B (H3N2) ANTIGEN (FORMALDEHYDE INACTIVATED), AND INFLUENZA B VIRUS B/BRISBANE/60/2008 ANTIGEN (FORMALDEHYDE INACTIVATED) 0.5 ML: 60; 60; 60 INJECTION, SUSPENSION INTRAMUSCULAR at 06:00

## 2018-01-23 RX ADMIN — ACETAMINOPHEN 650 MG: 325 TABLET, FILM COATED ORAL at 11:15

## 2018-01-23 RX ADMIN — GUAIFENESIN 200 MG: 100 SOLUTION ORAL at 11:15

## 2018-01-23 RX ADMIN — PNEUMOCOCCAL 13-VALENT CONJUGATE VACCINE 0.5 ML: 2.2; 2.2; 2.2; 2.2; 2.2; 4.4; 2.2; 2.2; 2.2; 2.2; 2.2; 2.2; 2.2 INJECTION, SUSPENSION INTRAMUSCULAR at 05:57

## 2018-01-23 RX ADMIN — LEVOTHYROXINE SODIUM 25 MCG: 25 TABLET ORAL at 06:03

## 2018-01-23 RX ADMIN — GUAIFENESIN 200 MG: 100 SOLUTION ORAL at 17:57

## 2018-01-23 RX ADMIN — HYDROCHLOROTHIAZIDE 25 MG: 25 TABLET ORAL at 08:35

## 2018-01-23 RX ADMIN — LEVOFLOXACIN 750 MG: 750 INJECTION, SOLUTION INTRAVENOUS at 20:58

## 2018-01-23 RX ADMIN — FLUOXETINE HYDROCHLORIDE 20 MG: 20 CAPSULE ORAL at 08:35

## 2018-01-23 ASSESSMENT — ENCOUNTER SYMPTOMS
ORTHOPNEA: 0
SHORTNESS OF BREATH: 0
COUGH: 1
BRUISES/BLEEDS EASILY: 0
HEARTBURN: 0
CHILLS: 0
PHOTOPHOBIA: 0
MYALGIAS: 0
DIZZINESS: 0
HEMOPTYSIS: 0
PALPITATIONS: 0
DOUBLE VISION: 0
WEIGHT LOSS: 0
SPUTUM PRODUCTION: 0
DEPRESSION: 0

## 2018-01-23 ASSESSMENT — PAIN SCALES - GENERAL
PAINLEVEL_OUTOF10: 4
PAINLEVEL_OUTOF10: 6
PAINLEVEL_OUTOF10: 6
PAINLEVEL_OUTOF10: 2
PAINLEVEL_OUTOF10: 8

## 2018-01-23 NOTE — PROGRESS NOTES
Diagnosis: Malignant neoplasm of left kidney    Regimen: Opdivo  Cycle/Day: C13D1    Dr Delano Ahuja is supervising provider today.    ECO - Fully active, able to carry on all predisease activities without restrictions.    Nursing Assessment:   Patient was seen by provider prior to coming to infusion for treatment.  Per Dr Ahuja, OK to proceed with treatment as signed in plan. Writer reviewed URSZULA NOEL.    Pre-Treatment: - Treatment consent signed  - Patient has valid pre-authorization  - VS completed  - BSA double checked  - Premed orders, including hydration, are verified prior to administration  - Treatment parameters verified in patient protocol  - Lab results checked   - Chemotherapy doses independently doubled checked & verified by two practitioners  - Chemotherapy infusion pump settings are double checked & verified by two practitioners  - Patient is identified by first & last name, Date of birth that has been verified by two practitioners with the patient chairside.    Treatment: Refer to LDA and MAR for line assessment and medication administration  Refer to Toxicity Assessment doc flowsheet   Blood return confirmed before, during and after chemotherapy administered  Infusion pump used for non-vesicant drugs    Post Treatment: Treatment tolerated well; no adverse reaction    Transfusion: Not needed    Education: No new instructions needed    Next appointment scheduled:   Future Appointments  Date Time Provider Department Center   2017 9:30 AM [id number]   2017 10:00 AM Alisha Ledezma PA-C [id number]   2017 8:00 AM [id number]   2017 8:30 AM Delano Ahuja MD [id number]   2017 8:00 AM DO KEM Pérez   2017 10:20 AM Edgardo Crews MD [facility]       Patient instructed to call the office with any questions or concerns.    Patient Discharged: patient discharged to home per self, ambulatory       [facility] Hospitalist Progress Note    Date of Service: 2018    Chief Complaint  77 y.o. female admitted 2018 with pneumonia and respiratory failure.    Interval Problem Update  Feeling a bit better today, no fever, still requiring 1L of o2. Not in distress    Consultants/Specialty  none    Disposition  Home when stable.         Review of Systems   Constitutional: Negative for chills and weight loss.   HENT: Negative for ear pain and tinnitus.    Eyes: Negative for double vision and photophobia.   Respiratory: Positive for cough. Negative for hemoptysis, sputum production and shortness of breath.    Cardiovascular: Negative for chest pain, palpitations and orthopnea.   Gastrointestinal: Negative for heartburn and melena.   Genitourinary: Negative for dysuria and urgency.   Musculoskeletal: Negative for myalgias.   Skin: Negative for itching.   Neurological: Negative for dizziness.   Endo/Heme/Allergies: Does not bruise/bleed easily.   Psychiatric/Behavioral: Negative for depression.      Physical Exam  Laboratory/Imaging   Hemodynamics  Temp (24hrs), Av.7 °C (98 °F), Min:36.2 °C (97.2 °F), Max:36.9 °C (98.5 °F)   Temperature: 36.7 °C (98 °F)  Pulse  Av.4  Min: 63  Max: 99 Heart Rate (Monitored): 76  Blood Pressure : 129/69, NIBP: 129/94      Respiratory      Respiration: 19, Pulse Oximetry: 93 %, O2 Daily Delivery Respiratory : Silicone Nasal Cannula     Given By:: Mouthpiece  RUL Breath Sounds: Diminished, RML Breath Sounds: Diminished, RLL Breath Sounds: Diminished;Crackles, ROSSY Breath Sounds: Diminished;Crackles, LLL Breath Sounds: Diminished;Crackles    Fluids    Intake/Output Summary (Last 24 hours) at 18 1516  Last data filed at 18 0756   Gross per 24 hour   Intake              731 ml   Output             1150 ml   Net             -419 ml       Nutrition  Orders Placed This Encounter   Procedures   • Diet Order     Standing Status:   Standing     Number of Occurrences:   1     Order  Specific Question:   Diet:     Answer:   Regular [1]     Physical Exam   Constitutional: She is oriented to person, place, and time. No distress.   HENT:   Mouth/Throat: No oropharyngeal exudate.   Eyes: Conjunctivae are normal.   Neck: Neck supple. No JVD present.   Cardiovascular: Normal rate, regular rhythm and normal heart sounds.    Pulmonary/Chest: Effort normal. No stridor. No respiratory distress. She has no wheezes. She has rales.   Abdominal: Soft. Bowel sounds are normal. She exhibits no distension. There is no tenderness.   Musculoskeletal: Normal range of motion. She exhibits no tenderness.   Neurological: She is oriented to person, place, and time. She exhibits normal muscle tone.   Skin: No erythema.   Psychiatric: She has a normal mood and affect.   Nursing note and vitals reviewed.      Recent Labs      01/22/18   1640  01/23/18   0144   WBC  8.8  7.0   RBC  3.75*  3.55*   HEMOGLOBIN  11.3*  10.8*   HEMATOCRIT  34.4*  32.3*   MCV  91.7  91.0   MCH  30.1  30.4   MCHC  32.8*  33.4*   RDW  41.5  40.8   PLATELETCT  304  319   MPV  8.7*  8.8*     Recent Labs      01/22/18   1640  01/23/18   0144   SODIUM  139  132*   POTASSIUM  3.4*  4.2   CHLORIDE  104  100   CO2  26  22   GLUCOSE  92  178*   BUN  11  13   CREATININE  0.83  0.77   CALCIUM  8.9  8.5         Recent Labs      01/22/18   1640   BNPBTYPENAT  30              Assessment/Plan     Hypokalemia- (present on admission)   Assessment & Plan    Patient's potassium is low at 3.4 I have given her 20 mg of potassium twice daily potassium levels will be followed carefully and continue supplementation until she is in the therapeutic range.  Resolved.         CAP (community acquired pneumonia)- (present on admission)   Assessment & Plan    Patient on physical examination as well as on imaging studies has bilateral pneumonia. The patient states that she's been sick for about the past 7-10 days. Patient in the emergency room was evaluated and found to have  respiratory difficulties. The patient is coughing with sputum production at this point blood cultures and sputum cultures have been requested. Patient with her penicillin allergy is placed at this point on IV Levaquin. Patient will be monitored at this point for improvement we will also place her on RT protocol and oxygen protocol.  On levaquin, continue o2 per protocol, added cough medication.         Hypertension- (present on admission)   Assessment & Plan    Patient's blood pressure management will be optimized we will keep her systolic blood pressure under 140 diastolic under 90. Patient resumes on Coreg 25 mg twice daily and Hydrocort thiazide 25 mg daily as well as lisinopril 5 mg daily.  Stable keep monitoring.         Normochromic normocytic anemia- (present on admission)   Assessment & Plan    Monitor H&H if she drops 7 or 21 or becomes unstable transfuse.  Hb 10.8        Hyperlipidemia with target LDL less than 100- (present on admission)   Assessment & Plan    Continued low-fat low-cholesterol diet, monitor fasting lipid panel periodically.  F/u as outpatient.           Anxiety and depression- (present on admission)   Assessment & Plan    Patient is on Prozac for her depression, she is also on Elavil at nighttime.  Stable.         Fibromyalgia- (present on admission)   Assessment & Plan    Continue with pain management for her fibromyalgia. Patient remains on Lyrica.        Hypothyroid- (present on admission)   Assessment & Plan    -supportive measures with synthroid supplementation at 25 mcg per day, no change  -latest TSH is 0.730 and this is with in establish normal guidlines   Stable.             Reviewed items::  Labs reviewed, Medications reviewed and Radiology images reviewed  DVT prophylaxis - mechanical:  SCDs  Antibiotics:  Treating active infection/contamination beyond 24 hours perioperative coverage

## 2018-01-23 NOTE — H&P
Hospital Medicine History and Physical    Date of Service  1/22/2018    Chief Complaint  Chief Complaint   Patient presents with   • Chest Pressure   • Sent from Urgent Care       History of Presenting Illness  77 y.o. female who presented 1/22/2018 with acute shortness of breath and cough. The patient reports that the cough is productive of clear mucus however she has hard time getting the cough actually up. Patient says that the cost been there for about 7-10 days however it has intensified over the past 24 hours and has become much worse. The patient is reported to the emergency room for evaluation on imaging studies as well as physical examination she is found to have bilateral lower lobe pneumonia. Patient at this point is placed on IV gentamicin the form of Levaquin given her penicillin allergy we are not able to utilize other medications. Patient this point we'll continue with oxygen protocol as well as nebulizer treatments. Will follow at this point blood cultures and sputum cultures. Patient at this point will be given pain management as she does have chronic fibromyalgia which has worsened with her infection.   Primary Care Physician  Paramjit Akbar M.D.    Consultants  None    Code Status  DNR code status is discussed with the patient    Review of Systems  Review of Systems   Constitutional: Positive for chills. Negative for diaphoresis and fever.   HENT: Negative.    Eyes: Negative.  Negative for double vision.   Respiratory: Positive for cough and shortness of breath. Negative for hemoptysis and wheezing.    Cardiovascular: Negative.  Negative for chest pain, palpitations and leg swelling.   Gastrointestinal: Negative.  Negative for abdominal pain, blood in stool, constipation, diarrhea, heartburn, nausea and vomiting.   Genitourinary: Negative.  Negative for frequency, hematuria and urgency.   Musculoskeletal: Positive for myalgias. Negative for joint pain.   Skin: Negative.  Negative for itching  and rash.   Neurological: Positive for weakness. Negative for dizziness, focal weakness, seizures, loss of consciousness and headaches.   Endo/Heme/Allergies: Negative.  Does not bruise/bleed easily.   Psychiatric/Behavioral: Negative.  Negative for suicidal ideas. The patient is not nervous/anxious.    All other systems reviewed and are negative.       Past Medical History  Past Medical History:   Diagnosis Date   • Anxiety    • Hypertension    • Thyroid disease        Surgical History  Past Surgical History:   Procedure Laterality Date   • ABDOMINAL HYSTERECTOMY TOTAL     • APPENDECTOMY     • HYSTERECTOMY LAPAROSCOPY         Medications  No current facility-administered medications on file prior to encounter.      Current Outpatient Prescriptions on File Prior to Encounter   Medication Sig Dispense Refill   • Oxycodone-Acetaminophen (PERCOCET-10)  MG Tab Take 1-2 Tabs by mouth every four hours as needed for Severe Pain.     • lisinopril (PRINIVIL) 5 MG TABS Take 1 Tab by mouth every day. 90 Tab 3   • hydrochlorothiazide (HYDRODIURIL) 25 MG TABS Take 1 Tab by mouth every day. 90 Tab 3   • fluoxetine (PROZAC) 20 MG CAPS Take 20 mg by mouth every day.           Family History  Family History   Problem Relation Age of Onset   • Cancer Mother    • Alcohol/Drug Father        Social History  Social History   Substance Use Topics   • Smoking status: Never Smoker   • Smokeless tobacco: Never Used   • Alcohol use No       Allergies  Allergies   Allergen Reactions   • Amoxicillin Anaphylaxis     Stop breathing   • Pcn [Penicillins] Anaphylaxis     Stop breathing        Physical Exam  Laboratory   Hemodynamics  Temp (24hrs), Av.9 °C (100.3 °F), Min:37.9 °C (100.3 °F), Max:37.9 °C (100.3 °F)   Temperature: 37.9 °C (100.3 °F)  Pulse  Av.2  Min: 86  Max: 99 Heart Rate (Monitored): 90  Blood Pressure : 142/57, NIBP: 112/46      Respiratory      Respiration: (!) 21, Pulse Oximetry: 96 %, O2 Daily Delivery  Respiratory : Silicone Nasal Cannula     Given By:: Mouthpiece  RUL Breath Sounds: Clear, RML Breath Sounds: Clear, RLL Breath Sounds: Diminished, ROSSY Breath Sounds: Clear, LLL Breath Sounds: Diminished    Physical Exam   Constitutional: She is oriented to person, place, and time. She appears well-developed and well-nourished. She is not intubated.   HENT:   Head: Normocephalic and atraumatic.   Right Ear: External ear normal.   Left Ear: External ear normal.   Nose: Nose normal.   Mouth/Throat: Oropharynx is clear and moist.   Eyes: Conjunctivae and EOM are normal. Pupils are equal, round, and reactive to light.   Neck: Normal range of motion. Neck supple. No JVD present. No thyromegaly present.   Cardiovascular: Normal rate and regular rhythm.    No murmur heard.  Pulmonary/Chest: Accessory muscle usage present. No apnea, no tachypnea and no bradypnea. She is not intubated. No respiratory distress. She has decreased breath sounds in the right lower field and the left lower field. She has no wheezes. She has no rales. She exhibits no tenderness.   Abdominal: She exhibits no distension and no mass. There is no tenderness. There is no rebound and no guarding.   Musculoskeletal: Normal range of motion. She exhibits no edema or tenderness.   Lymphadenopathy:     She has no cervical adenopathy.   Neurological: She is alert and oriented to person, place, and time. She has normal reflexes. No cranial nerve deficit.   Skin: Skin is warm and dry. No rash noted. No erythema.   Psychiatric: She has a normal mood and affect.   Nursing note and vitals reviewed.      Recent Labs      01/22/18   1640   WBC  8.8   RBC  3.75*   HEMOGLOBIN  11.3*   HEMATOCRIT  34.4*   MCV  91.7   MCH  30.1   MCHC  32.8*   RDW  41.5   PLATELETCT  304   MPV  8.7*     Recent Labs      01/22/18   1640   SODIUM  139   POTASSIUM  3.4*   CHLORIDE  104   CO2  26   GLUCOSE  92   BUN  11   CREATININE  0.83   CALCIUM  8.9     Recent Labs      01/22/18   1640    ALTSGPT  6   ASTSGOT  17   ALKPHOSPHAT  101*   TBILIRUBIN  0.3   GLUCOSE  92         Recent Labs      01/22/18   1640   BNPBTYPENAT  30         Lab Results   Component Value Date    TROPONINI <0.01 01/22/2018     Urinalysis:  No results found for: SPECGRAVITY, GLUCOSEUR, KETONES, NITRITE, WBCURINE, RBCURINE, BACTERIA, EPITHELCELL     Imaging  DX-CHEST-PORTABLE (1 VIEW)    (Results Pending)        Assessment/Plan     I anticipate this patient will require at least two midnights for appropriate medical management, necessitating inpatient admission.    Hypokalemia- (present on admission)   Assessment & Plan    Patient's potassium is low at 3.4 I have given her 20 mg of potassium twice daily potassium levels will be followed carefully and continue supplementation until she is in the therapeutic range.        CAP (community acquired pneumonia)- (present on admission)   Assessment & Plan    Patient on physical examination as well as on imaging studies has bilateral pneumonia. The patient states that she's been sick for about the past 7-10 days. Patient in the emergency room was evaluated and found to have respiratory difficulties. The patient is coughing with sputum production at this point blood cultures and sputum cultures have been requested. Patient with her penicillin allergy is placed at this point on IV Levaquin. Patient will be monitored at this point for improvement we will also place her on RT protocol and oxygen protocol.        Hypertension- (present on admission)   Assessment & Plan    Patient's blood pressure management will be optimized we will keep her systolic blood pressure under 140 diastolic under 90. Patient resumes on Coreg 25 mg twice daily and Hydrocort thiazide 25 mg daily as well as lisinopril 5 mg daily.        Normochromic normocytic anemia- (present on admission)   Assessment & Plan    Monitor H&H if she drops 7 or 21 or becomes unstable transfuse.        Hyperlipidemia with target LDL less  than 100- (present on admission)   Assessment & Plan    Continued low-fat low-cholesterol diet, monitor fasting lipid panel periodically.        Anxiety and depression- (present on admission)   Assessment & Plan    Patient is on Prozac for her depression, she is also on Elavil at nighttime.        Fibromyalgia- (present on admission)   Assessment & Plan    Continue with pain management for her fibromyalgia. Patient remains on Lyrica.        Hypothyroid- (present on admission)   Assessment & Plan    -supportive measures with synthroid supplementation at 25 mcg per day, no change  -latest TSH is 0.730 and this is with in establish normal guidlines             VTE prophylaxis: Sequentials.

## 2018-01-23 NOTE — CARE PLAN
Problem: Safety  Goal: Will remain free from falls  Outcome: PROGRESSING SLOWER THAN EXPECTED  Needs reminders to call to ambulate to bathroom.

## 2018-01-23 NOTE — PROGRESS NOTES
Assumed care of patient. Received in report that she was very drowsy from Hs dose of Ambien and has continued to be drowsy throughout the shift. Will hold narcotics and assess as needed. VS stable, no distress, respirations even and non labored.

## 2018-01-23 NOTE — ED NOTES
Daughter reports pt has chronic pain to neck and back and normally takes percocet 10/325 at home. Daughter states pt normally takes one around this time. ERP notified. Pt medicated per orders, see MAR.

## 2018-01-23 NOTE — ED NOTES
Med rec updated and compete.  Allergies reviewed.  Pt  Denies antibiotic use in last 30 days.  Pt carvedilol is 25 mg and pt is suppose to cut it is half and  Take 12.5 mg bid.  Pt stated that she got tired of cutting it in half.  Pt has been out of her pravastatin for > 6 months.

## 2018-01-23 NOTE — ASSESSMENT & PLAN NOTE
Continued low-fat low-cholesterol diet, monitor fasting lipid panel periodically.  F/u as outpatient.

## 2018-01-23 NOTE — PROGRESS NOTES
Pt arrived to unit, tele box in place, confirmed with monitor room, pt assessment completed, oriented to unit and room, updated on POC and answered all questions, call light and personal belongings in reach, bed alarm and hourly rounding in place, mobility assessed and proper signs placed.

## 2018-01-23 NOTE — CARE PLAN
Problem: Safety  Goal: Will remain free from falls  Outcome: PROGRESSING AS EXPECTED  RN educated pt on fall risk and fall prevention, bed locked and in lowest position, call light and personal belongings in reach, bed alarm and hourly rounding in place, mobility assessed and proper communication signs placed.       Problem: Infection  Goal: Will remain free from infection  Outcome: PROGRESSING AS EXPECTED  RN educated pt on infection prevention, RN used thorough hand hygiene before and after interactions with pt, encouraged pt  to use proper hand hygiene.

## 2018-01-23 NOTE — ASSESSMENT & PLAN NOTE
-stopped levaquin already, concerning for ILD/IPF?, see above  -see above  -try to wean o2  -RT protocol, monitor volume status closely

## 2018-01-23 NOTE — PROGRESS NOTES
Pt is sleeping in bed, unlabored breathing, no signs of distress, call light and personal belongings in reach, bed alarm and hourly rounding in place.

## 2018-01-23 NOTE — ASSESSMENT & PLAN NOTE
-supportive measures with synthroid supplementation at 25 mcg per day, no change  -latest TSH is 0.730 and this is with in establish normal guidlines   Stable.

## 2018-01-23 NOTE — DIETARY
"Nutrition Services:  Poor PO    Poor PO on Nutrition Admit Screen. Pt is currently on a Regular diet and no meals recorded in ADL flow sheet to assess PO. Ht: 157.5 cm, Wt: 66.4 kg, BMI 26.77.  Spoke with pt at bedside.  Pt reports a low appetite PTA.  Pt states she is not a \"big eater\" at home.  Lunch tray observed at bedside was <25% consumed.  Discussed meal preferences/snacks with pt and will add to daily menu to encourage PO and ensure adequate intake.  Pt denies any recent wt loss and states a UBW of 32.7 kg (138 lbs).  No wt loss noted.  RD will follow for adequate nutrition intake.     RD following.  "

## 2018-01-23 NOTE — PROGRESS NOTES
Two RN skin check completed with CARLOS Ramirez. Pt has scabs on second toe of both feet, no other signs of skin breakdown noted.

## 2018-01-23 NOTE — ASSESSMENT & PLAN NOTE
-blood pressure is still on the low side, no changes planned at this time  --no BP medications, griffin to PO lasix  -continue to hold other outpatient BP meds at this time  -adjust PRN

## 2018-01-24 LAB
ANION GAP SERPL CALC-SCNC: 9 MMOL/L (ref 0–11.9)
BASOPHILS # BLD AUTO: 0.3 % (ref 0–1.8)
BASOPHILS # BLD: 0.03 K/UL (ref 0–0.12)
BUN SERPL-MCNC: 14 MG/DL (ref 8–22)
CALCIUM SERPL-MCNC: 8.4 MG/DL (ref 8.5–10.5)
CHLORIDE SERPL-SCNC: 107 MMOL/L (ref 96–112)
CO2 SERPL-SCNC: 22 MMOL/L (ref 20–33)
CREAT SERPL-MCNC: 0.74 MG/DL (ref 0.5–1.4)
EOSINOPHIL # BLD AUTO: 0.22 K/UL (ref 0–0.51)
EOSINOPHIL NFR BLD: 1.8 % (ref 0–6.9)
ERYTHROCYTE [DISTWIDTH] IN BLOOD BY AUTOMATED COUNT: 41.7 FL (ref 35.9–50)
GLUCOSE SERPL-MCNC: 75 MG/DL (ref 65–99)
HCT VFR BLD AUTO: 32.7 % (ref 37–47)
HGB BLD-MCNC: 10.3 G/DL (ref 12–16)
IMM GRANULOCYTES # BLD AUTO: 0.05 K/UL (ref 0–0.11)
IMM GRANULOCYTES NFR BLD AUTO: 0.4 % (ref 0–0.9)
LYMPHOCYTES # BLD AUTO: 1.86 K/UL (ref 1–4.8)
LYMPHOCYTES NFR BLD: 15.5 % (ref 22–41)
MCH RBC QN AUTO: 29 PG (ref 27–33)
MCHC RBC AUTO-ENTMCNC: 31.5 G/DL (ref 33.6–35)
MCV RBC AUTO: 92.1 FL (ref 81.4–97.8)
MONOCYTES # BLD AUTO: 0.8 K/UL (ref 0–0.85)
MONOCYTES NFR BLD AUTO: 6.7 % (ref 0–13.4)
NEUTROPHILS # BLD AUTO: 9.01 K/UL (ref 2–7.15)
NEUTROPHILS NFR BLD: 75.3 % (ref 44–72)
NRBC # BLD AUTO: 0 K/UL
NRBC BLD-RTO: 0 /100 WBC
PLATELET # BLD AUTO: 349 K/UL (ref 164–446)
PMV BLD AUTO: 8.7 FL (ref 9–12.9)
POTASSIUM SERPL-SCNC: 4 MMOL/L (ref 3.6–5.5)
RBC # BLD AUTO: 3.55 M/UL (ref 4.2–5.4)
SODIUM SERPL-SCNC: 138 MMOL/L (ref 135–145)
WBC # BLD AUTO: 12 K/UL (ref 4.8–10.8)

## 2018-01-24 PROCEDURE — G8979 MOBILITY GOAL STATUS: HCPCS | Mod: CI

## 2018-01-24 PROCEDURE — 36415 COLL VENOUS BLD VENIPUNCTURE: CPT

## 2018-01-24 PROCEDURE — 700105 HCHG RX REV CODE 258: Performed by: HOSPITALIST

## 2018-01-24 PROCEDURE — 85025 COMPLETE CBC W/AUTO DIFF WBC: CPT

## 2018-01-24 PROCEDURE — 700102 HCHG RX REV CODE 250 W/ 637 OVERRIDE(OP): Performed by: HOSPITALIST

## 2018-01-24 PROCEDURE — 770020 HCHG ROOM/CARE - TELE (206)

## 2018-01-24 PROCEDURE — A9270 NON-COVERED ITEM OR SERVICE: HCPCS | Performed by: HOSPITALIST

## 2018-01-24 PROCEDURE — 80048 BASIC METABOLIC PNL TOTAL CA: CPT

## 2018-01-24 PROCEDURE — G8978 MOBILITY CURRENT STATUS: HCPCS | Mod: CJ

## 2018-01-24 PROCEDURE — 99232 SBSQ HOSP IP/OBS MODERATE 35: CPT | Performed by: HOSPITALIST

## 2018-01-24 PROCEDURE — 97162 PT EVAL MOD COMPLEX 30 MIN: CPT

## 2018-01-24 RX ORDER — LEVOFLOXACIN 750 MG/1
750 TABLET, FILM COATED ORAL
Status: COMPLETED | OUTPATIENT
Start: 2018-01-24 | End: 2018-01-28

## 2018-01-24 RX ORDER — SODIUM CHLORIDE 9 MG/ML
500 INJECTION, SOLUTION INTRAVENOUS ONCE
Status: COMPLETED | OUTPATIENT
Start: 2018-01-24 | End: 2018-01-24

## 2018-01-24 RX ADMIN — ACETAMINOPHEN 650 MG: 325 TABLET, FILM COATED ORAL at 19:29

## 2018-01-24 RX ADMIN — SODIUM CHLORIDE: 9 INJECTION, SOLUTION INTRAVENOUS at 06:28

## 2018-01-24 RX ADMIN — POTASSIUM CHLORIDE 20 MEQ: 1500 TABLET, EXTENDED RELEASE ORAL at 20:19

## 2018-01-24 RX ADMIN — GUAIFENESIN 200 MG: 100 SOLUTION ORAL at 18:00

## 2018-01-24 RX ADMIN — PREGABALIN 150 MG: 150 CAPSULE ORAL at 20:19

## 2018-01-24 RX ADMIN — GUAIFENESIN 200 MG: 100 SOLUTION ORAL at 11:30

## 2018-01-24 RX ADMIN — SODIUM CHLORIDE 500 ML: 9 INJECTION, SOLUTION INTRAVENOUS at 09:30

## 2018-01-24 RX ADMIN — OXYCODONE HYDROCHLORIDE AND ACETAMINOPHEN 1 TABLET: 10; 325 TABLET ORAL at 13:26

## 2018-01-24 RX ADMIN — CARVEDILOL 25 MG: 25 TABLET, FILM COATED ORAL at 07:55

## 2018-01-24 RX ADMIN — AMITRIPTYLINE HYDROCHLORIDE 150 MG: 50 TABLET, FILM COATED ORAL at 20:19

## 2018-01-24 RX ADMIN — LEVOFLOXACIN 750 MG: 750 TABLET, FILM COATED ORAL at 10:45

## 2018-01-24 RX ADMIN — LISINOPRIL 5 MG: 5 TABLET ORAL at 07:55

## 2018-01-24 RX ADMIN — LEVOTHYROXINE SODIUM 25 MCG: 25 TABLET ORAL at 06:24

## 2018-01-24 RX ADMIN — OXYCODONE HYDROCHLORIDE AND ACETAMINOPHEN 1 TABLET: 10; 325 TABLET ORAL at 07:56

## 2018-01-24 RX ADMIN — FLUOXETINE HYDROCHLORIDE 20 MG: 20 CAPSULE ORAL at 07:55

## 2018-01-24 RX ADMIN — HYDROCHLOROTHIAZIDE 25 MG: 25 TABLET ORAL at 07:55

## 2018-01-24 RX ADMIN — POTASSIUM CHLORIDE 20 MEQ: 1500 TABLET, EXTENDED RELEASE ORAL at 07:55

## 2018-01-24 ASSESSMENT — ENCOUNTER SYMPTOMS
BRUISES/BLEEDS EASILY: 0
CHILLS: 0
HEMOPTYSIS: 0
FEVER: 0
COUGH: 0
HEARTBURN: 0
SPUTUM PRODUCTION: 0
DOUBLE VISION: 0
ORTHOPNEA: 0
MYALGIAS: 0
DEPRESSION: 0
PALPITATIONS: 0
DIZZINESS: 0
PHOTOPHOBIA: 0

## 2018-01-24 ASSESSMENT — GAIT ASSESSMENTS
DISTANCE (FEET): 8
DEVIATION: DECREASED BASE OF SUPPORT
GAIT LEVEL OF ASSIST: CONTACT GUARD ASSIST
ASSISTIVE DEVICE: FRONT WHEEL WALKER

## 2018-01-24 ASSESSMENT — PAIN SCALES - GENERAL
PAINLEVEL_OUTOF10: 5
PAINLEVEL_OUTOF10: 0
PAINLEVEL_OUTOF10: 8
PAINLEVEL_OUTOF10: 4
PAINLEVEL_OUTOF10: 5
PAINLEVEL_OUTOF10: 8

## 2018-01-24 ASSESSMENT — COGNITIVE AND FUNCTIONAL STATUS - GENERAL
MOBILITY SCORE: 20
SUGGESTED CMS G CODE MODIFIER MOBILITY: CJ
CLIMB 3 TO 5 STEPS WITH RAILING: A LOT
STANDING UP FROM CHAIR USING ARMS: A LITTLE
WALKING IN HOSPITAL ROOM: A LITTLE

## 2018-01-24 NOTE — CARE PLAN
Problem: Knowledge Deficit  Goal: Knowledge of the prescribed therapeutic regimen will improve  Outcome: PROGRESSING AS EXPECTED  RN discussed prescribed medications with pt, educated on use and side effects, answered all questions.     Problem: Pain Management  Goal: Pain level will decrease to patient's comfort goal  Outcome: PROGRESSING AS EXPECTED  RN discussed numerical pain rating scale with pt, educated on use of pain medications and side effects, offered non-pharmacological interventions, frequent reassessment.

## 2018-01-24 NOTE — PROGRESS NOTES
[facility]ist Progress Note    Date of Service: 2018    Chief Complaint  77 y.o. female admitted 2018 with pneumonia and respiratory failure.    Interval Problem Update  Had hypotension today better after iv bolus 500cc, still on 2.5L of o2 no sob, not in distress, no fever or chills.     Consultants/Specialty  none    Disposition  Home when stable.         Review of Systems   Constitutional: Negative for chills and fever.   HENT: Negative for ear pain and tinnitus.    Eyes: Negative for double vision and photophobia.   Respiratory: Negative for cough, hemoptysis and sputum production.    Cardiovascular: Negative for chest pain, palpitations and orthopnea.   Gastrointestinal: Negative for heartburn and melena.   Genitourinary: Negative for dysuria and urgency.   Musculoskeletal: Negative for myalgias.   Skin: Negative for itching.   Neurological: Negative for dizziness.   Endo/Heme/Allergies: Does not bruise/bleed easily.   Psychiatric/Behavioral: Negative for depression.      Physical Exam  Laboratory/Imaging   Hemodynamics  Temp (24hrs), Av °C (98.6 °F), Min:36.7 °C (98.1 °F), Max:37.3 °C (99.2 °F)   Temperature: 36.8 °C (98.2 °F)  Pulse  Av.9  Min: 63  Max: 99    Blood Pressure : 137/67      Respiratory      Respiration: 19, Pulse Oximetry: 90 %        RUL Breath Sounds: Diminished;Crackles, RML Breath Sounds: Diminished;Crackles, RLL Breath Sounds: Diminished;Crackles, ROSSY Breath Sounds: Diminished;Crackles, LLL Breath Sounds: Diminished;Crackles    Fluids    Intake/Output Summary (Last 24 hours) at 18 1457  Last data filed at 18 0600   Gross per 24 hour   Intake              600 ml   Output             2650 ml   Net            -2050 ml       Nutrition  Orders Placed This Encounter   Procedures   • Diet Order     Standing Status:   Standing     Number of Occurrences:   1     Order Specific Question:   Diet:     Answer:   Regular [1]     Physical Exam   Constitutional: She is  oriented to person, place, and time. No distress.   HENT:   Mouth/Throat: No oropharyngeal exudate.   Eyes: Conjunctivae are normal.   Neck: Neck supple.   Cardiovascular: Normal rate, regular rhythm and normal heart sounds.    Pulmonary/Chest: Effort normal. No respiratory distress. She has no wheezes. She has rales.   Abdominal: Soft. Bowel sounds are normal. She exhibits no distension. There is no tenderness.   Musculoskeletal: Normal range of motion. She exhibits no tenderness.   Neurological: She is oriented to person, place, and time. She exhibits normal muscle tone.   Skin: No erythema.   Psychiatric: She has a normal mood and affect.   Nursing note and vitals reviewed.      Recent Labs      01/22/18   1640  01/23/18   0144  01/24/18   0222   WBC  8.8  7.0  12.0*   RBC  3.75*  3.55*  3.55*   HEMOGLOBIN  11.3*  10.8*  10.3*   HEMATOCRIT  34.4*  32.3*  32.7*   MCV  91.7  91.0  92.1   MCH  30.1  30.4  29.0   MCHC  32.8*  33.4*  31.5*   RDW  41.5  40.8  41.7   PLATELETCT  304  319  349   MPV  8.7*  8.8*  8.7*     Recent Labs      01/22/18   1640  01/23/18   0144  01/24/18   0222   SODIUM  139  132*  138   POTASSIUM  3.4*  4.2  4.0   CHLORIDE  104  100  107   CO2  26  22  22   GLUCOSE  92  178*  75   BUN  11  13  14   CREATININE  0.83  0.77  0.74   CALCIUM  8.9  8.5  8.4*         Recent Labs      01/22/18   1640   BNPBTYPENAT  30              Assessment/Plan     Hypokalemia- (present on admission)   Assessment & Plan    Patient's potassium is low at 3.4 I have given her 20 mg of potassium twice daily potassium levels will be followed carefully and continue supplementation until she is in the therapeutic range.  Resolved.         CAP (community acquired pneumonia)- (present on admission)   Assessment & Plan    Patient on physical examination as well as on imaging studies has bilateral pneumonia. The patient states that she's been sick for about the past 7-10 days. Patient in the emergency room was evaluated and  found to have respiratory difficulties. The patient is coughing with sputum production at this point blood cultures and sputum cultures have been requested. Patient with her penicillin allergy is placed at this point on IV Levaquin. Patient will be monitored at this point for improvement we will also place her on RT protocol and oxygen protocol.  On levaquin, continue o2 per protocol, added cough medication.   Still on 2.5L of o2 asked to use IS more often.         Hypertension- (present on admission)   Assessment & Plan    Patient's blood pressure management will be optimized we will keep her systolic blood pressure under 140 diastolic under 90. Patient resumes on Coreg 25 mg twice daily and Hydrocort thiazide 25 mg daily as well as lisinopril 5 mg daily.  Had low bp this morning better after iv bolus 500cc. Likely orthostatic         Normochromic normocytic anemia- (present on admission)   Assessment & Plan    Monitor H&H if she drops 7 or 21 or becomes unstable transfuse.  Hb 10.8        Hyperlipidemia with target LDL less than 100- (present on admission)   Assessment & Plan    Continued low-fat low-cholesterol diet, monitor fasting lipid panel periodically.  F/u as outpatient.           Anxiety and depression- (present on admission)   Assessment & Plan    Patient is on Prozac for her depression, she is also on Elavil at nighttime.  Stable.         Fibromyalgia- (present on admission)   Assessment & Plan    Continue with pain management for her fibromyalgia. Patient remains on Lyrica.        Hypothyroid- (present on admission)   Assessment & Plan    -supportive measures with synthroid supplementation at 25 mcg per day, no change  -latest TSH is 0.730 and this is with in establish normal guidlines   Stable.             Reviewed items::  Labs reviewed, Medications reviewed and Radiology images reviewed  DVT prophylaxis - mechanical:  SCDs  Antibiotics:  Treating active infection/contamination beyond 24 hours  perioperative coverage

## 2018-01-24 NOTE — PROGRESS NOTES
Pt is sleeping in bed, no signs of distress, no needs at this time, call light and personal belongings in reach, bed alarm and hourly rounding in place.

## 2018-01-24 NOTE — THERAPY
"Physical Therapy Evaluation completed.   Bed Mobility:  Supine to Sit: Stand by Assist  Transfers: Sit to Stand: Stand by Assist  Gait: Level Of Assist: Contact Guard Assist with Front-Wheel Walker       Plan of Care: Will benefit from Physical Therapy 3 times per week  Discharge Recommendations: Equipment: Will Continue to Assess for Equipment Needs. Post-acute therapy Discharge to home with outpatient or home health for additional skilled therapy services.    See \"Rehab Therapy-Acute\" Patient Summary Report for complete documentation.     Pt. presented to PT for primary risk reduction for LOB/falling. Pt. demonstrated with imparied balance, imparied gait, poor saftey awarenss, and decreased activity tolerance. Pt. was able to demonstrate CGA w/FWW for ambulation and transfers. Pt. demonstrated with a decreased BLAKE and poor saftey awarenss with FWW use for safe handling, as pt. tends to lift up FWW during turns and to avoid obstacles. Pt. tends to leave FWW behind during transfers. Recommend using FWW at this time as pt. is currenlty a fall risk per nsg. Pt. reported of dizziness during functional activity, however, remained stable and pt. was able to ambulate from bed to restroom and back to bed. Demonstrated with SpO2 of 83% on 4L with activity, recovered after 3 mins. Pt. will benefit from continued skilled PT while in house. Anticipate pt. to d/c home once activity tolerance and oxygen has improved and once medically clear.   "

## 2018-01-24 NOTE — PROGRESS NOTES
Bedside report received from day shift RN, pt is sitting up in bed, rates pain 2/10 in back, no needs at this time, updated on POC and answered all questions, call light and personal belongings in reach, bed alarm and hourly rounding in place.

## 2018-01-24 NOTE — CARE PLAN
Problem: Safety  Goal: Will remain free from injury  Outcome: NOT MET      Problem: Infection  Goal: Will remain free from infection  Outcome: PROGRESSING AS EXPECTED

## 2018-01-25 ENCOUNTER — APPOINTMENT (OUTPATIENT)
Dept: RADIOLOGY | Facility: MEDICAL CENTER | Age: 78
DRG: 196 | End: 2018-01-25
Attending: HOSPITALIST
Payer: COMMERCIAL

## 2018-01-25 LAB
BACTERIA UR CULT: NORMAL
BASE EXCESS BLDA CALC-SCNC: 0 MMOL/L (ref -4–3)
BASOPHILS # BLD AUTO: 0.3 % (ref 0–1.8)
BASOPHILS # BLD: 0.03 K/UL (ref 0–0.12)
BNP SERPL-MCNC: 54 PG/ML (ref 0–100)
BODY TEMPERATURE: ABNORMAL CENTIGRADE
EOSINOPHIL # BLD AUTO: 0.81 K/UL (ref 0–0.51)
EOSINOPHIL NFR BLD: 8.9 % (ref 0–6.9)
ERYTHROCYTE [DISTWIDTH] IN BLOOD BY AUTOMATED COUNT: 42.1 FL (ref 35.9–50)
HCO3 BLDA-SCNC: 24 MMOL/L (ref 17–25)
HCT VFR BLD AUTO: 32.9 % (ref 37–47)
HGB BLD-MCNC: 10.5 G/DL (ref 12–16)
IMM GRANULOCYTES # BLD AUTO: 0.03 K/UL (ref 0–0.11)
IMM GRANULOCYTES NFR BLD AUTO: 0.3 % (ref 0–0.9)
LV EJECT FRACT  99904: 60
LV EJECT FRACT MOD 2C 99903: 58.34
LV EJECT FRACT MOD 4C 99902: 63.14
LV EJECT FRACT MOD BP 99901: 61.94
LYMPHOCYTES # BLD AUTO: 1.34 K/UL (ref 1–4.8)
LYMPHOCYTES NFR BLD: 14.6 % (ref 22–41)
MCH RBC QN AUTO: 29.6 PG (ref 27–33)
MCHC RBC AUTO-ENTMCNC: 31.9 G/DL (ref 33.6–35)
MCV RBC AUTO: 92.7 FL (ref 81.4–97.8)
MONOCYTES # BLD AUTO: 0.89 K/UL (ref 0–0.85)
MONOCYTES NFR BLD AUTO: 9.7 % (ref 0–13.4)
NEUTROPHILS # BLD AUTO: 6.05 K/UL (ref 2–7.15)
NEUTROPHILS NFR BLD: 66.2 % (ref 44–72)
NRBC # BLD AUTO: 0 K/UL
NRBC BLD-RTO: 0 /100 WBC
PCO2 BLDA: 37.8 MMHG (ref 26–37)
PH BLDA: 7.42 [PH] (ref 7.4–7.5)
PLATELET # BLD AUTO: 334 K/UL (ref 164–446)
PMV BLD AUTO: 8.6 FL (ref 9–12.9)
PO2 BLDA: 58.6 MMHG (ref 64–87)
PROCALCITONIN SERPL-MCNC: <0.05 NG/ML
PROCALCITONIN SERPL-MCNC: <0.05 NG/ML
RBC # BLD AUTO: 3.55 M/UL (ref 4.2–5.4)
SAO2 % BLDA: 90 % (ref 93–99)
SIGNIFICANT IND 70042: NORMAL
SITE SITE: NORMAL
SOURCE SOURCE: NORMAL
WBC # BLD AUTO: 9.2 K/UL (ref 4.8–10.8)

## 2018-01-25 PROCEDURE — 700111 HCHG RX REV CODE 636 W/ 250 OVERRIDE (IP): Performed by: HOSPITALIST

## 2018-01-25 PROCEDURE — 71046 X-RAY EXAM CHEST 2 VIEWS: CPT

## 2018-01-25 PROCEDURE — 770020 HCHG ROOM/CARE - TELE (206)

## 2018-01-25 PROCEDURE — G8988 SELF CARE GOAL STATUS: HCPCS | Mod: CI

## 2018-01-25 PROCEDURE — A9270 NON-COVERED ITEM OR SERVICE: HCPCS | Performed by: HOSPITALIST

## 2018-01-25 PROCEDURE — 700102 HCHG RX REV CODE 250 W/ 637 OVERRIDE(OP): Performed by: HOSPITALIST

## 2018-01-25 PROCEDURE — 93306 TTE W/DOPPLER COMPLETE: CPT

## 2018-01-25 PROCEDURE — 85025 COMPLETE CBC W/AUTO DIFF WBC: CPT

## 2018-01-25 PROCEDURE — 36415 COLL VENOUS BLD VENIPUNCTURE: CPT

## 2018-01-25 PROCEDURE — 83880 ASSAY OF NATRIURETIC PEPTIDE: CPT

## 2018-01-25 PROCEDURE — 94640 AIRWAY INHALATION TREATMENT: CPT

## 2018-01-25 PROCEDURE — 700101 HCHG RX REV CODE 250: Performed by: HOSPITALIST

## 2018-01-25 PROCEDURE — 99233 SBSQ HOSP IP/OBS HIGH 50: CPT | Performed by: HOSPITALIST

## 2018-01-25 PROCEDURE — 82803 BLOOD GASES ANY COMBINATION: CPT

## 2018-01-25 PROCEDURE — 97165 OT EVAL LOW COMPLEX 30 MIN: CPT

## 2018-01-25 PROCEDURE — 93306 TTE W/DOPPLER COMPLETE: CPT | Mod: 26 | Performed by: INTERNAL MEDICINE

## 2018-01-25 PROCEDURE — G8987 SELF CARE CURRENT STATUS: HCPCS | Mod: CK

## 2018-01-25 PROCEDURE — 94760 N-INVAS EAR/PLS OXIMETRY 1: CPT

## 2018-01-25 PROCEDURE — 84145 PROCALCITONIN (PCT): CPT

## 2018-01-25 RX ORDER — ZOLPIDEM TARTRATE 5 MG/1
5 TABLET ORAL NIGHTLY PRN
Status: DISCONTINUED | OUTPATIENT
Start: 2018-01-25 | End: 2018-01-25

## 2018-01-25 RX ORDER — FUROSEMIDE 10 MG/ML
20 INJECTION INTRAMUSCULAR; INTRAVENOUS
Status: DISCONTINUED | OUTPATIENT
Start: 2018-01-25 | End: 2018-02-02

## 2018-01-25 RX ORDER — CARVEDILOL 12.5 MG/1
12.5 TABLET ORAL DAILY
Status: DISCONTINUED | OUTPATIENT
Start: 2018-01-26 | End: 2018-01-25

## 2018-01-25 RX ORDER — IPRATROPIUM BROMIDE AND ALBUTEROL SULFATE 2.5; .5 MG/3ML; MG/3ML
3 SOLUTION RESPIRATORY (INHALATION)
Status: DISCONTINUED | OUTPATIENT
Start: 2018-01-25 | End: 2018-01-26

## 2018-01-25 RX ORDER — FUROSEMIDE 10 MG/ML
40 INJECTION INTRAMUSCULAR; INTRAVENOUS
Status: DISCONTINUED | OUTPATIENT
Start: 2018-01-25 | End: 2018-01-25

## 2018-01-25 RX ORDER — OXYCODONE HYDROCHLORIDE AND ACETAMINOPHEN 5; 325 MG/1; MG/1
1 TABLET ORAL EVERY 4 HOURS PRN
Status: DISCONTINUED | OUTPATIENT
Start: 2018-01-25 | End: 2018-02-03 | Stop reason: HOSPADM

## 2018-01-25 RX ADMIN — ACETAMINOPHEN 650 MG: 325 TABLET, FILM COATED ORAL at 13:08

## 2018-01-25 RX ADMIN — POTASSIUM CHLORIDE 20 MEQ: 1500 TABLET, EXTENDED RELEASE ORAL at 20:17

## 2018-01-25 RX ADMIN — PREGABALIN 150 MG: 150 CAPSULE ORAL at 20:16

## 2018-01-25 RX ADMIN — AMITRIPTYLINE HYDROCHLORIDE 150 MG: 50 TABLET, FILM COATED ORAL at 20:16

## 2018-01-25 RX ADMIN — STANDARDIZED SENNA CONCENTRATE AND DOCUSATE SODIUM 2 TABLET: 8.6; 5 TABLET, FILM COATED ORAL at 08:41

## 2018-01-25 RX ADMIN — FUROSEMIDE 20 MG: 10 INJECTION, SOLUTION INTRAMUSCULAR; INTRAVENOUS at 18:06

## 2018-01-25 RX ADMIN — OXYCODONE HYDROCHLORIDE AND ACETAMINOPHEN 1 TABLET: 10; 325 TABLET ORAL at 08:41

## 2018-01-25 RX ADMIN — LISINOPRIL 5 MG: 5 TABLET ORAL at 08:41

## 2018-01-25 RX ADMIN — IPRATROPIUM BROMIDE AND ALBUTEROL SULFATE 3 ML: .5; 3 SOLUTION RESPIRATORY (INHALATION) at 10:50

## 2018-01-25 RX ADMIN — LEVOTHYROXINE SODIUM 25 MCG: 25 TABLET ORAL at 05:28

## 2018-01-25 RX ADMIN — IPRATROPIUM BROMIDE AND ALBUTEROL SULFATE 3 ML: .5; 3 SOLUTION RESPIRATORY (INHALATION) at 19:05

## 2018-01-25 RX ADMIN — POTASSIUM CHLORIDE 20 MEQ: 1500 TABLET, EXTENDED RELEASE ORAL at 08:41

## 2018-01-25 RX ADMIN — GUAIFENESIN 200 MG: 100 SOLUTION ORAL at 13:04

## 2018-01-25 RX ADMIN — IPRATROPIUM BROMIDE AND ALBUTEROL SULFATE 3 ML: .5; 3 SOLUTION RESPIRATORY (INHALATION) at 13:57

## 2018-01-25 RX ADMIN — OXYCODONE HYDROCHLORIDE AND ACETAMINOPHEN 1 TABLET: 5; 325 TABLET ORAL at 18:06

## 2018-01-25 RX ADMIN — CARVEDILOL 25 MG: 25 TABLET, FILM COATED ORAL at 08:41

## 2018-01-25 RX ADMIN — GUAIFENESIN 200 MG: 100 SOLUTION ORAL at 05:28

## 2018-01-25 RX ADMIN — GUAIFENESIN 200 MG: 100 SOLUTION ORAL at 18:06

## 2018-01-25 RX ADMIN — FLUOXETINE HYDROCHLORIDE 20 MG: 20 CAPSULE ORAL at 08:41

## 2018-01-25 RX ADMIN — HYDROCHLOROTHIAZIDE 25 MG: 25 TABLET ORAL at 08:41

## 2018-01-25 ASSESSMENT — LIFESTYLE VARIABLES: EVER_SMOKED: NEVER

## 2018-01-25 ASSESSMENT — COGNITIVE AND FUNCTIONAL STATUS - GENERAL
HELP NEEDED FOR BATHING: A LITTLE
TOILETING: A LITTLE
DAILY ACTIVITIY SCORE: 19
DRESSING REGULAR UPPER BODY CLOTHING: A LITTLE
PERSONAL GROOMING: A LITTLE
DRESSING REGULAR LOWER BODY CLOTHING: A LITTLE
SUGGESTED CMS G CODE MODIFIER DAILY ACTIVITY: CK

## 2018-01-25 ASSESSMENT — ENCOUNTER SYMPTOMS
COUGH: 0
SPUTUM PRODUCTION: 0
HEMOPTYSIS: 1
ORTHOPNEA: 0
NAUSEA: 0
DEPRESSION: 0
PALPITATIONS: 0
HEARTBURN: 0
BRUISES/BLEEDS EASILY: 0
MYALGIAS: 0
DIZZINESS: 0
WEIGHT LOSS: 0

## 2018-01-25 ASSESSMENT — PAIN SCALES - GENERAL
PAINLEVEL_OUTOF10: 0
PAINLEVEL_OUTOF10: 9
PAINLEVEL_OUTOF10: 0
PAINLEVEL_OUTOF10: 3
PAINLEVEL_OUTOF10: 0
PAINLEVEL_OUTOF10: 7

## 2018-01-25 ASSESSMENT — ACTIVITIES OF DAILY LIVING (ADL): TOILETING: INDEPENDENT

## 2018-01-25 ASSESSMENT — COPD QUESTIONNAIRES
DURING THE PAST 4 WEEKS HOW MUCH DID YOU FEEL SHORT OF BREATH: NONE/LITTLE OF THE TIME
DO YOU EVER COUGH UP ANY MUCUS OR PHLEGM?: NO/ONLY WITH OCCASIONAL COLDS OR INFECTIONS
COPD SCREENING SCORE: 2
HAVE YOU SMOKED AT LEAST 100 CIGARETTES IN YOUR ENTIRE LIFE: NO/DON'T KNOW

## 2018-01-25 NOTE — PROGRESS NOTES
[facility]ist Progress Note    Date of Service: 2018    Chief Complaint  77 y.o. female admitted 2018 with pneumonia and respiratory failure.    Interval Problem Update  She is on 6L of o2 today, cxr showed worsening infiltrate, added lasix today 20 mg bid since her bp is borderline low.    Consultants/Specialty  none    Disposition  Home when stable.         Review of Systems   Constitutional: Negative for weight loss.   HENT: Negative for ear discharge.    Respiratory: Positive for hemoptysis. Negative for cough and sputum production.    Cardiovascular: Negative for chest pain, palpitations and orthopnea.   Gastrointestinal: Negative for heartburn and nausea.   Genitourinary: Negative for dysuria.   Musculoskeletal: Negative for myalgias.   Skin: Negative for itching.   Neurological: Negative for dizziness.   Endo/Heme/Allergies: Does not bruise/bleed easily.   Psychiatric/Behavioral: Negative for depression.      Physical Exam  Laboratory/Imaging   Hemodynamics  Temp (24hrs), Av.6 °C (97.9 °F), Min:36.1 °C (96.9 °F), Max:37.1 °C (98.8 °F)   Temperature: 36.9 °C (98.4 °F)  Pulse  Av.5  Min: 63  Max: 99    Blood Pressure : 127/69      Respiratory      Respiration: 16, Pulse Oximetry: 94 %, O2 Daily Delivery Respiratory : Silicone Nasal Cannula     Given By:: Mouthpiece, Work Of Breathing / Effort: Mild  RUL Breath Sounds: Clear, RML Breath Sounds: Clear, RLL Breath Sounds: Diminished, ROSSY Breath Sounds: Clear, LLL Breath Sounds: Diminished    Fluids    Intake/Output Summary (Last 24 hours) at 18 1232  Last data filed at 18 1900   Gross per 24 hour   Intake              600 ml   Output             1550 ml   Net             -950 ml       Nutrition  Orders Placed This Encounter   Procedures   • Diet Order     Standing Status:   Standing     Number of Occurrences:   1     Order Specific Question:   Diet:     Answer:   Regular [1]     Physical Exam   Constitutional: She is oriented to  person, place, and time. No distress.   Neck: Neck supple. No JVD present.   Cardiovascular: Normal rate, regular rhythm and normal heart sounds.    Pulmonary/Chest: Effort normal. No respiratory distress. She has no wheezes. She has rales (ronchi ).   Abdominal: Soft. Bowel sounds are normal. She exhibits no distension. There is no tenderness.   Musculoskeletal: Normal range of motion. She exhibits no tenderness.   Neurological: She is oriented to person, place, and time. She exhibits normal muscle tone.   Skin: No erythema.   Psychiatric: She has a normal mood and affect.   Nursing note and vitals reviewed.      Recent Labs      01/23/18   0144  01/24/18   0222  01/25/18   0332   WBC  7.0  12.0*  9.2   RBC  3.55*  3.55*  3.55*   HEMOGLOBIN  10.8*  10.3*  10.5*   HEMATOCRIT  32.3*  32.7*  32.9*   MCV  91.0  92.1  92.7   MCH  30.4  29.0  29.6   MCHC  33.4*  31.5*  31.9*   RDW  40.8  41.7  42.1   PLATELETCT  319  349  334   MPV  8.8*  8.7*  8.6*     Recent Labs      01/22/18   1640  01/23/18   0144  01/24/18   0222   SODIUM  139  132*  138   POTASSIUM  3.4*  4.2  4.0   CHLORIDE  104  100  107   CO2  26  22  22   GLUCOSE  92  178*  75   BUN  11  13  14   CREATININE  0.83  0.77  0.74   CALCIUM  8.9  8.5  8.4*         Recent Labs      01/22/18   1640  01/25/18   0755   BNPBTYPENAT  30  54              Assessment/Plan     Hypokalemia- (present on admission)   Assessment & Plan    Patient's potassium is low at 3.4 I have given her 20 mg of potassium twice daily potassium levels will be followed carefully and continue supplementation until she is in the therapeutic range.  Resolved.         CAP (community acquired pneumonia)- (present on admission)   Assessment & Plan    Patient on physical examination as well as on imaging studies has bilateral pneumonia. The patient states that she's been sick for about the past 7-10 days. Patient in the emergency room was evaluated and found to have respiratory difficulties. The patient  is coughing with sputum production at this point blood cultures and sputum cultures have been requested. Patient with her penicillin allergy is placed at this point on IV Levaquin. Patient will be monitored at this point for improvement we will also place her on RT protocol and oxygen protocol.  On levaquin, continue o2 per protocol, added cough medication.   Still on 2.5L of o2 asked to use IS more often.   On 6L of o2, cxr showed worsen b/l infiltrate that could be pneumonia vs edema started on iv lasix today echo still pending.         Hypertension- (present on admission)   Assessment & Plan    Patient's blood pressure management will be optimized we will keep her systolic blood pressure under 140 diastolic under 90. Patient resumes on Coreg 25 mg twice daily and Hydrocort thiazide 25 mg daily as well as lisinopril 5 mg daily.  Had low bp this morning better after iv bolus 500cc. Likely orthostatic   Will hold her bp med for now.         Normochromic normocytic anemia- (present on admission)   Assessment & Plan    Monitor H&H if she drops 7 or 21 or becomes unstable transfuse.  Hb 10.8        Hyperlipidemia with target LDL less than 100- (present on admission)   Assessment & Plan    Continued low-fat low-cholesterol diet, monitor fasting lipid panel periodically.  F/u as outpatient.           Anxiety and depression- (present on admission)   Assessment & Plan    Patient is on Prozac for her depression, she is also on Elavil at nighttime.  Stable.         Fibromyalgia- (present on admission)   Assessment & Plan    Continue with pain management for her fibromyalgia. Patient remains on Lyrica.        Hypothyroid- (present on admission)   Assessment & Plan    -supportive measures with synthroid supplementation at 25 mcg per day, no change  -latest TSH is 0.730 and this is with in establish normal guidlines   Stable.             Reviewed items::  Labs reviewed, Medications reviewed and Radiology images reviewed  DVT  prophylaxis - mechanical:  SCDs  Antibiotics:  Treating active infection/contamination beyond 24 hours perioperative coverage

## 2018-01-25 NOTE — PROGRESS NOTES
Pt returned to room after being down in X-ray. Generalized weakness requiring a 2 person assist to transfer from wheelchair to bed. Notified by X-ray that patient was unable to stand for test and barely keep her eyes open. Notified MD and re-evaluate medication regimen for risk of over sedation

## 2018-01-25 NOTE — THERAPY
"Occupational Therapy Evaluation completed.   Functional Status:  Pt s/p PNA, demonstrtaing mild to moderate deficits with ADLs due to generalized fatigue, lower back pain which impacted pt's oob ADLs tolerance, strength and decreased balance. Performed bed mobility with cga from a raised hob and use of bed rails, LB dressing cga, STS from eob with cga, short ambulation around bed with FWW; cues required for FWW positioning and sequencing. Pt would benefit from acute skilled services while in house and pending pt's medical needs and oob tolerance will dicate pt's d/c disposition. Pt needs to be I with ADLs/IADLs prior to d/c home.   Plan of Care: Will benefit from Occupational Therapy 3 times per week  Discharge Recommendations:  Equipment: Will Continue to Assess for Equipment Needs. Post-acute therapy see above note.    See \"Rehab Therapy-Acute\" Patient Summary Report for complete documentation.    "

## 2018-01-25 NOTE — DISCHARGE PLANNING
Medical Social Work    DEYANIRA completed chart review and notes that pt taken off unit for chest x-ray. Pt is currently on 6L O2. DEYANIRA will meet with pt at bedside to complete assessment once she returns to her room.

## 2018-01-25 NOTE — CARE PLAN
Problem: Communication  Goal: The ability to communicate needs accurately and effectively will improve  Outcome: PROGRESSING AS EXPECTED  RN educated on use of call light system, updated on POC and answered all questions, call light in reach,encouraged pt to voice needs.     Problem: Infection  Goal: Will remain free from infection  Outcome: PROGRESSING AS EXPECTED  RN educated pt on infection prevention, RN used thorough hand hygiene before and after interactions with pt, encouraged pt to use proper hand hygiene.

## 2018-01-25 NOTE — PROGRESS NOTES
Bedside report received from day shift RN, pt is sitting up in bed, no signs of distress, no needs at this time, updated on POC and answered all questions, call light and personal belongings in reach, bed alarm and hourly rounding in place.

## 2018-01-25 NOTE — CARE PLAN
Problem: Oxygenation:  Goal: Maintain adequate oxygenation dependent on patient condition  Outcome: PROGRESSING AS EXPECTED    Intervention: Manage oxygen therapy by monitoring pulse oximetry and/or ABG values  Pt on On 5L NC; SPO2 94%      Problem: Bronchoconstriction:  Goal: Improve in air movement and diminished wheezing  Outcome: PROGRESSING AS EXPECTED    Intervention: Implement inhaled treatments  DUO Q4

## 2018-01-25 NOTE — CARE PLAN
Problem: Safety  Goal: Will remain free from injury  Outcome: PROGRESSING AS EXPECTED  Pt has been free of falls this hospital stay. Appropriately calls to get needs met and before attempting to get out of bed.    Problem: Venous Thromboembolism (VTW)/Deep Vein Thrombosis (DVT) Prevention:  Goal: Patient will participate in Venous Thrombosis (VTE)/Deep Vein Thrombosis (DVT)Prevention Measures  Outcome: PROGRESSING AS EXPECTED  Pt has SCDs ordered for VTE prophylaxis. Reports no pain/tenderness in calves upon assessment

## 2018-01-25 NOTE — CARE PLAN
Assumed care of patient at 0645, received bedside report from Martin Kellogg. Pt currently sitting up in bed, spO2 at 89% recovering from ambulation to the bathroom. On 6L [place]. Will have a chest X-ray at 10am. Weak non-productive cough. Will continue to monitor.

## 2018-01-26 ENCOUNTER — APPOINTMENT (OUTPATIENT)
Dept: RADIOLOGY | Facility: MEDICAL CENTER | Age: 78
DRG: 196 | End: 2018-01-26
Attending: INTERNAL MEDICINE
Payer: COMMERCIAL

## 2018-01-26 ENCOUNTER — APPOINTMENT (OUTPATIENT)
Dept: RADIOLOGY | Facility: MEDICAL CENTER | Age: 78
DRG: 196 | End: 2018-01-26
Attending: HOSPITALIST
Payer: COMMERCIAL

## 2018-01-26 PROBLEM — J96.00 ACUTE RESPIRATORY FAILURE (HCC): Status: ACTIVE | Noted: 2018-01-26

## 2018-01-26 PROBLEM — J96.01 ACUTE RESPIRATORY FAILURE WITH HYPOXIA (HCC): Status: ACTIVE | Noted: 2018-01-26

## 2018-01-26 LAB
ALBUMIN SERPL BCP-MCNC: 3 G/DL (ref 3.2–4.9)
ALP SERPL-CCNC: 73 U/L (ref 30–99)
ALT SERPL-CCNC: 9 U/L (ref 2–50)
ANION GAP SERPL CALC-SCNC: 11 MMOL/L (ref 0–11.9)
AST SERPL-CCNC: 23 U/L (ref 12–45)
BASE EXCESS BLDA CALC-SCNC: 1 MMOL/L (ref -4–3)
BILIRUB CONJ SERPL-MCNC: <0.1 MG/DL (ref 0.1–0.5)
BILIRUB INDIRECT SERPL-MCNC: ABNORMAL MG/DL (ref 0–1)
BILIRUB SERPL-MCNC: 0.4 MG/DL (ref 0.1–1.5)
BODY TEMPERATURE: 37.5 CENTIGRADE
BUN SERPL-MCNC: 12 MG/DL (ref 8–22)
CALCIUM SERPL-MCNC: 9 MG/DL (ref 8.5–10.5)
CHLORIDE SERPL-SCNC: 102 MMOL/L (ref 96–112)
CO2 SERPL-SCNC: 23 MMOL/L (ref 20–33)
CREAT SERPL-MCNC: 0.92 MG/DL (ref 0.5–1.4)
CRP SERPL HS-MCNC: 15.83 MG/DL (ref 0–0.75)
ERYTHROCYTE [SEDIMENTATION RATE] IN BLOOD BY WESTERGREN METHOD: 106 MM/HOUR (ref 0–30)
ERYTHROCYTE [SEDIMENTATION RATE] IN BLOOD BY WESTERGREN METHOD: 99 MM/HOUR (ref 0–30)
GLUCOSE SERPL-MCNC: 100 MG/DL (ref 65–99)
HCO3 BLDA-SCNC: 24 MMOL/L (ref 17–25)
HIV 1+2 AB+HIV1 P24 AG SERPL QL IA: NON REACTIVE
HIV 1+2 AB+HIV1 P24 AG SERPL QL IA: NON REACTIVE
INHALED O2 FLOW RATE: 9 L/MIN (ref 2–10)
LDH SERPL L TO P-CCNC: 204 U/L (ref 107–266)
PCO2 BLDA: 33.6 MMHG (ref 26–37)
PCO2 TEMP ADJ BLDA: 34.3 MMHG (ref 26–37)
PH BLDA: 7.47 [PH] (ref 7.4–7.5)
PH TEMP ADJ BLDA: 7.46 [PH] (ref 7.4–7.5)
PO2 BLDA: 66.8 MMHG (ref 64–87)
PO2 TEMP ADJ BLDA: 69.1 MMHG (ref 64–87)
POTASSIUM SERPL-SCNC: 3.8 MMOL/L (ref 3.6–5.5)
PROT SERPL-MCNC: 6.1 G/DL (ref 6–8.2)
SAO2 % BLDA: 93.7 % (ref 93–99)
SODIUM SERPL-SCNC: 136 MMOL/L (ref 135–145)

## 2018-01-26 PROCEDURE — 83615 LACTATE (LD) (LDH) ENZYME: CPT

## 2018-01-26 PROCEDURE — 86140 C-REACTIVE PROTEIN: CPT

## 2018-01-26 PROCEDURE — 80076 HEPATIC FUNCTION PANEL: CPT

## 2018-01-26 PROCEDURE — 82803 BLOOD GASES ANY COMBINATION: CPT

## 2018-01-26 PROCEDURE — 700117 HCHG RX CONTRAST REV CODE 255: Performed by: HOSPITALIST

## 2018-01-26 PROCEDURE — 86038 ANTINUCLEAR ANTIBODIES: CPT

## 2018-01-26 PROCEDURE — 87389 HIV-1 AG W/HIV-1&-2 AB AG IA: CPT | Mod: 91

## 2018-01-26 PROCEDURE — 770020 HCHG ROOM/CARE - TELE (206)

## 2018-01-26 PROCEDURE — 700111 HCHG RX REV CODE 636 W/ 250 OVERRIDE (IP): Performed by: INTERNAL MEDICINE

## 2018-01-26 PROCEDURE — 700101 HCHG RX REV CODE 250: Performed by: HOSPITALIST

## 2018-01-26 PROCEDURE — 86235 NUCLEAR ANTIGEN ANTIBODY: CPT | Mod: 91

## 2018-01-26 PROCEDURE — 94640 AIRWAY INHALATION TREATMENT: CPT

## 2018-01-26 PROCEDURE — 99233 SBSQ HOSP IP/OBS HIGH 50: CPT | Performed by: HOSPITALIST

## 2018-01-26 PROCEDURE — 700102 HCHG RX REV CODE 250 W/ 637 OVERRIDE(OP): Performed by: HOSPITALIST

## 2018-01-26 PROCEDURE — 36415 COLL VENOUS BLD VENIPUNCTURE: CPT

## 2018-01-26 PROCEDURE — 85652 RBC SED RATE AUTOMATED: CPT | Mod: 91

## 2018-01-26 PROCEDURE — 71260 CT THORAX DX C+: CPT

## 2018-01-26 PROCEDURE — 700111 HCHG RX REV CODE 636 W/ 250 OVERRIDE (IP): Performed by: HOSPITALIST

## 2018-01-26 PROCEDURE — 86225 DNA ANTIBODY NATIVE: CPT

## 2018-01-26 PROCEDURE — A9270 NON-COVERED ITEM OR SERVICE: HCPCS | Performed by: HOSPITALIST

## 2018-01-26 PROCEDURE — 80048 BASIC METABOLIC PNL TOTAL CA: CPT

## 2018-01-26 PROCEDURE — 71045 X-RAY EXAM CHEST 1 VIEW: CPT

## 2018-01-26 RX ORDER — IPRATROPIUM BROMIDE AND ALBUTEROL SULFATE 2.5; .5 MG/3ML; MG/3ML
3 SOLUTION RESPIRATORY (INHALATION)
Status: DISCONTINUED | OUTPATIENT
Start: 2018-01-26 | End: 2018-02-03 | Stop reason: HOSPADM

## 2018-01-26 RX ORDER — METHYLPREDNISOLONE SODIUM SUCCINATE 125 MG/2ML
62.5 INJECTION, POWDER, LYOPHILIZED, FOR SOLUTION INTRAMUSCULAR; INTRAVENOUS EVERY 8 HOURS
Status: DISCONTINUED | OUTPATIENT
Start: 2018-01-26 | End: 2018-01-28

## 2018-01-26 RX ORDER — HEPARIN SODIUM 5000 [USP'U]/ML
5000 INJECTION, SOLUTION INTRAVENOUS; SUBCUTANEOUS EVERY 8 HOURS
Status: DISCONTINUED | OUTPATIENT
Start: 2018-01-26 | End: 2018-02-03 | Stop reason: HOSPADM

## 2018-01-26 RX ORDER — AMITRIPTYLINE HYDROCHLORIDE 50 MG/1
100 TABLET, FILM COATED ORAL
Status: DISCONTINUED | OUTPATIENT
Start: 2018-01-26 | End: 2018-02-03 | Stop reason: HOSPADM

## 2018-01-26 RX ORDER — IPRATROPIUM BROMIDE AND ALBUTEROL SULFATE 2.5; .5 MG/3ML; MG/3ML
3 SOLUTION RESPIRATORY (INHALATION)
Status: DISCONTINUED | OUTPATIENT
Start: 2018-01-26 | End: 2018-01-29 | Stop reason: ALTCHOICE

## 2018-01-26 RX ADMIN — GUAIFENESIN 200 MG: 100 SOLUTION ORAL at 17:16

## 2018-01-26 RX ADMIN — GUAIFENESIN 200 MG: 100 SOLUTION ORAL at 12:25

## 2018-01-26 RX ADMIN — LEVOFLOXACIN 750 MG: 750 TABLET, FILM COATED ORAL at 10:50

## 2018-01-26 RX ADMIN — METHYLPREDNISOLONE SODIUM SUCCINATE 62.5 MG: 125 INJECTION, POWDER, FOR SOLUTION INTRAMUSCULAR; INTRAVENOUS at 17:29

## 2018-01-26 RX ADMIN — FUROSEMIDE 20 MG: 10 INJECTION, SOLUTION INTRAMUSCULAR; INTRAVENOUS at 05:54

## 2018-01-26 RX ADMIN — HEPARIN SODIUM 5000 UNITS: 5000 INJECTION, SOLUTION INTRAVENOUS; SUBCUTANEOUS at 21:24

## 2018-01-26 RX ADMIN — IOHEXOL 100 ML: 350 INJECTION, SOLUTION INTRAVENOUS at 08:45

## 2018-01-26 RX ADMIN — IPRATROPIUM BROMIDE AND ALBUTEROL SULFATE 3 ML: .5; 3 SOLUTION RESPIRATORY (INHALATION) at 07:17

## 2018-01-26 RX ADMIN — FLUOXETINE HYDROCHLORIDE 20 MG: 20 CAPSULE ORAL at 08:07

## 2018-01-26 RX ADMIN — IPRATROPIUM BROMIDE AND ALBUTEROL SULFATE 3 ML: .5; 3 SOLUTION RESPIRATORY (INHALATION) at 10:27

## 2018-01-26 RX ADMIN — POTASSIUM CHLORIDE 20 MEQ: 1500 TABLET, EXTENDED RELEASE ORAL at 09:00

## 2018-01-26 RX ADMIN — POTASSIUM CHLORIDE 20 MEQ: 1500 TABLET, EXTENDED RELEASE ORAL at 21:24

## 2018-01-26 RX ADMIN — AMITRIPTYLINE HYDROCHLORIDE 100 MG: 50 TABLET, FILM COATED ORAL at 21:24

## 2018-01-26 RX ADMIN — PREGABALIN 150 MG: 150 CAPSULE ORAL at 21:24

## 2018-01-26 RX ADMIN — OXYCODONE HYDROCHLORIDE AND ACETAMINOPHEN 1 TABLET: 5; 325 TABLET ORAL at 13:45

## 2018-01-26 RX ADMIN — METHYLPREDNISOLONE SODIUM SUCCINATE 62.5 MG: 125 INJECTION, POWDER, FOR SOLUTION INTRAMUSCULAR; INTRAVENOUS at 21:25

## 2018-01-26 RX ADMIN — GUAIFENESIN 200 MG: 100 SOLUTION ORAL at 05:52

## 2018-01-26 RX ADMIN — OXYCODONE HYDROCHLORIDE AND ACETAMINOPHEN 1 TABLET: 5; 325 TABLET ORAL at 20:12

## 2018-01-26 RX ADMIN — IPRATROPIUM BROMIDE AND ALBUTEROL SULFATE 3 ML: .5; 3 SOLUTION RESPIRATORY (INHALATION) at 18:22

## 2018-01-26 RX ADMIN — IPRATROPIUM BROMIDE AND ALBUTEROL SULFATE 3 ML: .5; 3 SOLUTION RESPIRATORY (INHALATION) at 14:19

## 2018-01-26 RX ADMIN — HEPARIN SODIUM 5000 UNITS: 5000 INJECTION, SOLUTION INTRAVENOUS; SUBCUTANEOUS at 12:26

## 2018-01-26 RX ADMIN — LEVOTHYROXINE SODIUM 25 MCG: 25 TABLET ORAL at 05:52

## 2018-01-26 ASSESSMENT — PATIENT HEALTH QUESTIONNAIRE - PHQ9
8. MOVING OR SPEAKING SO SLOWLY THAT OTHER PEOPLE COULD HAVE NOTICED. OR THE OPPOSITE, BEING SO FIGETY OR RESTLESS THAT YOU HAVE BEEN MOVING AROUND A LOT MORE THAN USUAL: NOT AT ALL
SUM OF ALL RESPONSES TO PHQ QUESTIONS 1-9: 6
4. FEELING TIRED OR HAVING LITTLE ENERGY: NEARLY EVERY DAY
7. TROUBLE CONCENTRATING ON THINGS, SUCH AS READING THE NEWSPAPER OR WATCHING TELEVISION: NOT AT ALL
6. FEELING BAD ABOUT YOURSELF - OR THAT YOU ARE A FAILURE OR HAVE LET YOURSELF OR YOUR FAMILY DOWN: NOT AL ALL
1. LITTLE INTEREST OR PLEASURE IN DOING THINGS: NOT AT ALL
5. POOR APPETITE OR OVEREATING: NOT AT ALL
2. FEELING DOWN, DEPRESSED, IRRITABLE, OR HOPELESS: MORE THAN HALF THE DAYS
3. TROUBLE FALLING OR STAYING ASLEEP OR SLEEPING TOO MUCH: SEVERAL DAYS
9. THOUGHTS THAT YOU WOULD BE BETTER OFF DEAD, OR OF HURTING YOURSELF: NOT AT ALL
SUM OF ALL RESPONSES TO PHQ9 QUESTIONS 1 AND 2: 2

## 2018-01-26 ASSESSMENT — LIFESTYLE VARIABLES
EVER_SMOKED: NEVER
DO YOU DRINK ALCOHOL: NO

## 2018-01-26 ASSESSMENT — ENCOUNTER SYMPTOMS
HEMOPTYSIS: 1
BLURRED VISION: 0
SHORTNESS OF BREATH: 1
HEADACHES: 0
PALPITATIONS: 0
WEIGHT LOSS: 0
MYALGIAS: 0
ORTHOPNEA: 0
BRUISES/BLEEDS EASILY: 0
DIZZINESS: 0
COUGH: 0
HEARTBURN: 0
SPUTUM PRODUCTION: 0
NAUSEA: 0
DEPRESSION: 0

## 2018-01-26 ASSESSMENT — COPD QUESTIONNAIRES
DO YOU EVER COUGH UP ANY MUCUS OR PHLEGM?: NO/ONLY WITH OCCASIONAL COLDS OR INFECTIONS
HAVE YOU SMOKED AT LEAST 100 CIGARETTES IN YOUR ENTIRE LIFE: NO/DON'T KNOW
DURING THE PAST 4 WEEKS HOW MUCH DID YOU FEEL SHORT OF BREATH: NONE/LITTLE OF THE TIME
COPD SCREENING SCORE: 2

## 2018-01-26 ASSESSMENT — PAIN SCALES - GENERAL
PAINLEVEL_OUTOF10: 8
PAINLEVEL_OUTOF10: 8
PAINLEVEL_OUTOF10: 0

## 2018-01-26 NOTE — CARE PLAN
Problem: Nutritional:  Goal: Achieve adequate nutritional intake  Patient will consume 50% of meals   Outcome: PROGRESSING AS EXPECTED  Patient with variable intake 25-75% of meals on regular diet.   Nutrition rep to continue to see for meal and snack preferences.   Encourage PO intake.

## 2018-01-26 NOTE — PROGRESS NOTES
[facility] Hospitalist Progress Note    Date of Service: 2018    Chief Complaint  77 y.o. female admitted 2018 with pneumonia and respiratory failure.    Interval Problem Update  She is awake and follows commands, confirmed DNR/DNI status, on 8L of o2, pulmonologist consulted continue lasix and levaquin for now.     Consultants/Specialty  none    Disposition  Tbd.         Review of Systems   Constitutional: Negative for weight loss.   HENT: Negative for ear discharge.    Eyes: Negative for blurred vision.   Respiratory: Positive for hemoptysis and shortness of breath. Negative for cough and sputum production.    Cardiovascular: Negative for chest pain, palpitations and orthopnea.   Gastrointestinal: Negative for heartburn and nausea.   Genitourinary: Negative for dysuria.   Musculoskeletal: Negative for myalgias.   Skin: Negative for itching.   Neurological: Negative for dizziness and headaches.   Endo/Heme/Allergies: Does not bruise/bleed easily.   Psychiatric/Behavioral: Negative for depression.      Physical Exam  Laboratory/Imaging   Hemodynamics  Temp (24hrs), Av.9 °C (98.4 °F), Min:36.7 °C (98 °F), Max:37.2 °C (98.9 °F)   Temperature: 36.7 °C (98.1 °F)  Pulse  Av.8  Min: 63  Max: 99    Blood Pressure : 116/57      Respiratory      Respiration: 20, Pulse Oximetry: 95 %, O2 Daily Delivery Respiratory : OxyMask     Given By:: Mouthpiece, Work Of Breathing / Effort: Moderate;Accessory Muscle Use  RUL Breath Sounds: Clear, RML Breath Sounds: Clear, RLL Breath Sounds: Diminished, ROSSY Breath Sounds: Clear, LLL Breath Sounds: Diminished    Fluids    Intake/Output Summary (Last 24 hours) at 18 1510  Last data filed at 18 0900   Gross per 24 hour   Intake              340 ml   Output             2600 ml   Net            -2260 ml       Nutrition  Orders Placed This Encounter   Procedures   • Diet Order     Standing Status:   Standing     Number of Occurrences:   1     Order Specific Question:    Diet:     Answer:   Regular [1]     Physical Exam   Constitutional: She is oriented to person, place, and time. No distress.   Neck: Neck supple. No JVD present.   Cardiovascular: Normal rate, regular rhythm and normal heart sounds.    Pulmonary/Chest: Effort normal. No respiratory distress. She has no wheezes. She has rales (ronchi ).   Crackles b/l   Abdominal: Soft. Bowel sounds are normal. She exhibits no distension. There is no tenderness.   Musculoskeletal: Normal range of motion. She exhibits no tenderness.   Neurological: She is oriented to person, place, and time. She exhibits normal muscle tone.   Skin: No erythema.   Psychiatric: She has a normal mood and affect.   Nursing note and vitals reviewed.      Recent Labs      01/24/18 0222 01/25/18   0332   WBC  12.0*  9.2   RBC  3.55*  3.55*   HEMOGLOBIN  10.3*  10.5*   HEMATOCRIT  32.7*  32.9*   MCV  92.1  92.7   MCH  29.0  29.6   MCHC  31.5*  31.9*   RDW  41.7  42.1   PLATELETCT  349  334   MPV  8.7*  8.6*     Recent Labs      01/24/18 0222  01/26/18   0252   SODIUM  138  136   POTASSIUM  4.0  3.8   CHLORIDE  107  102   CO2  22  23   GLUCOSE  75  100*   BUN  14  12   CREATININE  0.74  0.92   CALCIUM  8.4*  9.0         Recent Labs      01/25/18   0755   BNPBTYPENAT  54              Assessment/Plan     Hypokalemia- (present on admission)   Assessment & Plan    Patient's potassium is low at 3.4 I have given her 20 mg of potassium twice daily potassium levels will be followed carefully and continue supplementation until she is in the therapeutic range.  Resolved.         CAP (community acquired pneumonia)- (present on admission)   Assessment & Plan    Patient on physical examination as well as on imaging studies has bilateral pneumonia. The patient states that she's been sick for about the past 7-10 days. Patient in the emergency room was evaluated and found to have respiratory difficulties. The patient is coughing with sputum production at this point blood  cultures and sputum cultures have been requested. Patient with her penicillin allergy is placed at this point on IV Levaquin. Patient will be monitored at this point for improvement we will also place her on RT protocol and oxygen protocol.  On levaquin, continue o2 per protocol, added cough medication.   Still on 2.5L of o2 asked to use IS more often.   On 6L of o2, cxr showed worsen b/l infiltrate that could be pneumonia vs edema started on iv lasix today echo still pending.   Consulted pulmonologist today due to increasing o2 requirements, cxr showing mild improvement. Continue atb for now.         Hypertension- (present on admission)   Assessment & Plan    Patient's blood pressure management will be optimized we will keep her systolic blood pressure under 140 diastolic under 90. Patient resumes on Coreg 25 mg twice daily and Hydrocort thiazide 25 mg daily as well as lisinopril 5 mg daily.  Had low bp this morning better after iv bolus 500cc. Likely orthostatic   Will hold her bp med for now.   Keep holding bp meds for now.         Acute respiratory failure with hypoxia (CMS-HCC)- (present on admission)   Assessment & Plan    Multifactorial due to pneumonia, pulmonary edema, ARDS?  On 7-8 L of o2, pulmonologist consulted.         Normochromic normocytic anemia- (present on admission)   Assessment & Plan    Monitor H&H if she drops 7 or 21 or becomes unstable transfuse.  Hb stable.         Hyperlipidemia with target LDL less than 100- (present on admission)   Assessment & Plan    Continued low-fat low-cholesterol diet, monitor fasting lipid panel periodically.  F/u as outpatient.           Anxiety and depression- (present on admission)   Assessment & Plan    Patient is on Prozac for her depression, she is also on Elavil at nighttime.  Stable.         Fibromyalgia- (present on admission)   Assessment & Plan    Continue with pain management for her fibromyalgia. Patient remains on Lyrica.        Hypothyroid-  (present on admission)   Assessment & Plan    -supportive measures with synthroid supplementation at 25 mcg per day, no change  -latest TSH is 0.730 and this is with in establish normal guidlines   Stable.             Reviewed items::  Labs reviewed, Medications reviewed and Radiology images reviewed  DVT prophylaxis - mechanical:  SCDs  Antibiotics:  Treating active infection/contamination beyond 24 hours perioperative coverage
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
## 2018-01-26 NOTE — RESPIRATORY CARE
Attempted to perform IS with patient. Pt desated to 79% O2 and was placed back on 8L Oxymask. Pt was only able to pull 500 on IS.

## 2018-01-26 NOTE — PROGRESS NOTES
Patient desats to the high 70's during movement. No other signs of distress. However, she does complain of fatigue. MD aware. Will continue to monitor this patient.    1045 - Patient more lethargic. Orders for a stat ABG. Oxy mask increased from 5 L to 7 L. Hospitalist at bedside assessing patient.

## 2018-01-26 NOTE — PROGRESS NOTES
Pt vitals continue to be hypotensive, manual BP 98/58, held Lasix and notified Md. ABGs ordered to see if patient may require BiPap as patient is still requiring 5L Nc. No reports of pain. Reports minor dizziness. Will continue to monitor.

## 2018-01-26 NOTE — ASSESSMENT & PLAN NOTE
Multifactorial due to pneumonia, pulmonary edema, possible ILD, most likely the latter  -FLACA is negative  -s/p bronch, BAL's negative thus far, pathology of the fluid is pending at this time  -continue empiric steroids, increase to 50 mg daily, no change in o2  -will need home o2, very slow improvement, still quite hypoxic with mild ambulation,no changes again today  -unclear if she will need wedge bx or not?  -ECHO is ok

## 2018-01-27 ENCOUNTER — APPOINTMENT (OUTPATIENT)
Dept: RADIOLOGY | Facility: MEDICAL CENTER | Age: 78
DRG: 196 | End: 2018-01-27
Attending: HOSPITALIST
Payer: COMMERCIAL

## 2018-01-27 LAB
ANION GAP SERPL CALC-SCNC: 8 MMOL/L (ref 0–11.9)
APTT PPP: 37.7 SEC (ref 24.7–36)
BACTERIA BLD CULT: NORMAL
BACTERIA BLD CULT: NORMAL
BASOPHILS # BLD AUTO: 0.1 % (ref 0–1.8)
BASOPHILS # BLD: 0.01 K/UL (ref 0–0.12)
BUN SERPL-MCNC: 13 MG/DL (ref 8–22)
CALCIUM SERPL-MCNC: 9.3 MG/DL (ref 8.5–10.5)
CHLORIDE SERPL-SCNC: 100 MMOL/L (ref 96–112)
CO2 SERPL-SCNC: 25 MMOL/L (ref 20–33)
CREAT SERPL-MCNC: 0.8 MG/DL (ref 0.5–1.4)
EOSINOPHIL # BLD AUTO: 0 K/UL (ref 0–0.51)
EOSINOPHIL NFR BLD: 0 % (ref 0–6.9)
ERYTHROCYTE [DISTWIDTH] IN BLOOD BY AUTOMATED COUNT: 40.5 FL (ref 35.9–50)
GLUCOSE SERPL-MCNC: 187 MG/DL (ref 65–99)
HCT VFR BLD AUTO: 35.3 % (ref 37–47)
HGB BLD-MCNC: 11.3 G/DL (ref 12–16)
IMM GRANULOCYTES # BLD AUTO: 0.08 K/UL (ref 0–0.11)
IMM GRANULOCYTES NFR BLD AUTO: 0.9 % (ref 0–0.9)
LYMPHOCYTES # BLD AUTO: 0.85 K/UL (ref 1–4.8)
LYMPHOCYTES NFR BLD: 9.8 % (ref 22–41)
MCH RBC QN AUTO: 29.4 PG (ref 27–33)
MCHC RBC AUTO-ENTMCNC: 32 G/DL (ref 33.6–35)
MCV RBC AUTO: 91.7 FL (ref 81.4–97.8)
MONOCYTES # BLD AUTO: 0.09 K/UL (ref 0–0.85)
MONOCYTES NFR BLD AUTO: 1 % (ref 0–13.4)
NEUTROPHILS # BLD AUTO: 7.66 K/UL (ref 2–7.15)
NEUTROPHILS NFR BLD: 88.2 % (ref 44–72)
NRBC # BLD AUTO: 0 K/UL
NRBC BLD-RTO: 0 /100 WBC
PLATELET # BLD AUTO: 372 K/UL (ref 164–446)
PMV BLD AUTO: 8.4 FL (ref 9–12.9)
POTASSIUM SERPL-SCNC: 5 MMOL/L (ref 3.6–5.5)
RBC # BLD AUTO: 3.85 M/UL (ref 4.2–5.4)
SIGNIFICANT IND 70042: NORMAL
SIGNIFICANT IND 70042: NORMAL
SITE SITE: NORMAL
SITE SITE: NORMAL
SODIUM SERPL-SCNC: 133 MMOL/L (ref 135–145)
SOURCE SOURCE: NORMAL
SOURCE SOURCE: NORMAL
WBC # BLD AUTO: 8.7 K/UL (ref 4.8–10.8)

## 2018-01-27 PROCEDURE — 94760 N-INVAS EAR/PLS OXIMETRY 1: CPT

## 2018-01-27 PROCEDURE — 36415 COLL VENOUS BLD VENIPUNCTURE: CPT

## 2018-01-27 PROCEDURE — 94640 AIRWAY INHALATION TREATMENT: CPT

## 2018-01-27 PROCEDURE — 85025 COMPLETE CBC W/AUTO DIFF WBC: CPT

## 2018-01-27 PROCEDURE — A9270 NON-COVERED ITEM OR SERVICE: HCPCS | Performed by: HOSPITALIST

## 2018-01-27 PROCEDURE — 71045 X-RAY EXAM CHEST 1 VIEW: CPT

## 2018-01-27 PROCEDURE — 85730 THROMBOPLASTIN TIME PARTIAL: CPT

## 2018-01-27 PROCEDURE — 99232 SBSQ HOSP IP/OBS MODERATE 35: CPT | Performed by: HOSPITALIST

## 2018-01-27 PROCEDURE — 770020 HCHG ROOM/CARE - TELE (206)

## 2018-01-27 PROCEDURE — 700102 HCHG RX REV CODE 250 W/ 637 OVERRIDE(OP): Performed by: HOSPITALIST

## 2018-01-27 PROCEDURE — 700111 HCHG RX REV CODE 636 W/ 250 OVERRIDE (IP): Performed by: INTERNAL MEDICINE

## 2018-01-27 PROCEDURE — 700111 HCHG RX REV CODE 636 W/ 250 OVERRIDE (IP): Performed by: HOSPITALIST

## 2018-01-27 PROCEDURE — 700101 HCHG RX REV CODE 250: Performed by: HOSPITALIST

## 2018-01-27 PROCEDURE — 80048 BASIC METABOLIC PNL TOTAL CA: CPT

## 2018-01-27 RX ADMIN — LEVOTHYROXINE SODIUM 25 MCG: 25 TABLET ORAL at 06:38

## 2018-01-27 RX ADMIN — IPRATROPIUM BROMIDE AND ALBUTEROL SULFATE 3 ML: .5; 3 SOLUTION RESPIRATORY (INHALATION) at 06:58

## 2018-01-27 RX ADMIN — FUROSEMIDE 20 MG: 10 INJECTION, SOLUTION INTRAMUSCULAR; INTRAVENOUS at 06:39

## 2018-01-27 RX ADMIN — HEPARIN SODIUM 5000 UNITS: 5000 INJECTION, SOLUTION INTRAVENOUS; SUBCUTANEOUS at 13:58

## 2018-01-27 RX ADMIN — OXYCODONE HYDROCHLORIDE AND ACETAMINOPHEN 1 TABLET: 5; 325 TABLET ORAL at 23:25

## 2018-01-27 RX ADMIN — IPRATROPIUM BROMIDE AND ALBUTEROL SULFATE 3 ML: .5; 3 SOLUTION RESPIRATORY (INHALATION) at 10:26

## 2018-01-27 RX ADMIN — STANDARDIZED SENNA CONCENTRATE AND DOCUSATE SODIUM 2 TABLET: 8.6; 5 TABLET, FILM COATED ORAL at 08:59

## 2018-01-27 RX ADMIN — AMITRIPTYLINE HYDROCHLORIDE 100 MG: 50 TABLET, FILM COATED ORAL at 21:11

## 2018-01-27 RX ADMIN — GUAIFENESIN 200 MG: 100 SOLUTION ORAL at 17:28

## 2018-01-27 RX ADMIN — GUAIFENESIN 200 MG: 100 SOLUTION ORAL at 01:06

## 2018-01-27 RX ADMIN — FUROSEMIDE 20 MG: 10 INJECTION, SOLUTION INTRAMUSCULAR; INTRAVENOUS at 15:08

## 2018-01-27 RX ADMIN — GUAIFENESIN 200 MG: 100 SOLUTION ORAL at 13:58

## 2018-01-27 RX ADMIN — OXYCODONE HYDROCHLORIDE AND ACETAMINOPHEN 1 TABLET: 5; 325 TABLET ORAL at 15:08

## 2018-01-27 RX ADMIN — HEPARIN SODIUM 5000 UNITS: 5000 INJECTION, SOLUTION INTRAVENOUS; SUBCUTANEOUS at 21:10

## 2018-01-27 RX ADMIN — METHYLPREDNISOLONE SODIUM SUCCINATE 62.5 MG: 125 INJECTION, POWDER, FOR SOLUTION INTRAMUSCULAR; INTRAVENOUS at 14:00

## 2018-01-27 RX ADMIN — METHYLPREDNISOLONE SODIUM SUCCINATE 62.5 MG: 125 INJECTION, POWDER, FOR SOLUTION INTRAMUSCULAR; INTRAVENOUS at 21:10

## 2018-01-27 RX ADMIN — POTASSIUM CHLORIDE 20 MEQ: 1500 TABLET, EXTENDED RELEASE ORAL at 08:59

## 2018-01-27 RX ADMIN — HEPARIN SODIUM 5000 UNITS: 5000 INJECTION, SOLUTION INTRAVENOUS; SUBCUTANEOUS at 06:38

## 2018-01-27 RX ADMIN — POTASSIUM CHLORIDE 20 MEQ: 1500 TABLET, EXTENDED RELEASE ORAL at 21:11

## 2018-01-27 RX ADMIN — METHYLPREDNISOLONE SODIUM SUCCINATE 62.5 MG: 125 INJECTION, POWDER, FOR SOLUTION INTRAMUSCULAR; INTRAVENOUS at 06:38

## 2018-01-27 RX ADMIN — GUAIFENESIN 200 MG: 100 SOLUTION ORAL at 23:25

## 2018-01-27 RX ADMIN — IPRATROPIUM BROMIDE AND ALBUTEROL SULFATE 3 ML: .5; 3 SOLUTION RESPIRATORY (INHALATION) at 14:45

## 2018-01-27 RX ADMIN — FLUOXETINE HYDROCHLORIDE 20 MG: 20 CAPSULE ORAL at 08:59

## 2018-01-27 RX ADMIN — PREGABALIN 150 MG: 150 CAPSULE ORAL at 21:11

## 2018-01-27 RX ADMIN — GUAIFENESIN 200 MG: 100 SOLUTION ORAL at 06:38

## 2018-01-27 RX ADMIN — IPRATROPIUM BROMIDE AND ALBUTEROL SULFATE 3 ML: .5; 3 SOLUTION RESPIRATORY (INHALATION) at 19:40

## 2018-01-27 ASSESSMENT — ENCOUNTER SYMPTOMS
COUGH: 0
DEPRESSION: 0
DIZZINESS: 0
SPUTUM PRODUCTION: 0
MYALGIAS: 0
ORTHOPNEA: 0
WEIGHT LOSS: 0
HEADACHES: 0
BRUISES/BLEEDS EASILY: 0
PALPITATIONS: 0
NAUSEA: 0
HEMOPTYSIS: 1
HEARTBURN: 0
SHORTNESS OF BREATH: 1

## 2018-01-27 ASSESSMENT — PAIN SCALES - GENERAL
PAINLEVEL_OUTOF10: 8
PAINLEVEL_OUTOF10: 7
PAINLEVEL_OUTOF10: 0

## 2018-01-27 NOTE — CARE PLAN
Problem: Safety  Goal: Will remain free from injury  Outcome: PROGRESSING AS EXPECTED  Fall risk assessed, pt educated. Bed in lowest and locked position, call light and personal possessions within reach, frequent rounding in place.    Problem: Knowledge Deficit  Goal: Knowledge of disease process/condition, treatment plan, diagnostic tests, and medications will improve  Outcome: PROGRESSING AS EXPECTED  Pt educated about medications with each administration. All questions answered.

## 2018-01-27 NOTE — THERAPY
Physical Therapy Contact Note    Attempted PT tx session. Nsg states hold PT for now s/p patient de-satting to 70s with minimal mvmt. Will f/u as able.

## 2018-01-27 NOTE — PROGRESS NOTES
Pulmonary Critical Care Progress Note        Chief Complaint: very sob and weak    History of Present Illness: 77 y.o. female admitted for sob most acute over prior 2 weeks but increasing symptoms including fatigue for at least 6 months.  Also diffuse chest discomfort and cough.   No sx of CVD or known malignancy.  No birds or HIV risks.  No significant weight loss or change in bowel habits.      ROS:  Respiratory: positive shortness of breath, Cardiac: negative, GI: negative.  All other systems negative.    Interval Events:  24 hour interval history reviewed    PFSH:  No change.    Respiratory:     Pulse Oximetry: 95 %            Exam: rales throughout all lung fields to mid lung  Imaging  CT reviewed  1.  Hazy and groundglass bilateral opacities suggests underlying edema and/or infiltrates.  2.  Enlarged mediastinal lymph nodes, consider causes of adenopathy with additional workup as clinically appropriate.  3.  Atherosclerosis  4.  Cholelithiasis  5.  Small hiatal hernia  6.  Diverticulosis  7.  Bilateral pulmonary nodules measuring up to 10.5 mm. See nodule follow-up recommendations below  Recent Labs      01/25/18   1739  01/26/18   1126   [id number]  7.42  7.47   [id number]  37.8*  33.6   [id number]  58.6*  66.8   XVMF0YQS  90.0*  93.7   ARTHCO3  24  24   ARTBE  0  1       HemoDynamics:  Pulse: 88  Blood Pressure : 123/69       Exam: regular rate and rhythm  Imaging: Available data reviewed   ECHO-  Normal left ventricular chamber size.  Left ventricular ejection fraction is visually estimated to be 60%.  Moderately dilated right ventricle.  Normal right ventricular systolic function.  Moderate tricuspid regurgitation.   Right ventricular systolic pressure is estimated to be 65-70 mmHg  Recent Labs      01/25/18   0755   BNPBTYPENAT  54       Neuro:  GCS  15       Exam: no focal deficits noted  Imaging: None - Reviewed    Fluids:  Intake/Output       01/25/18 0700 - 01/26/18 0659 01/26/18 0700 - 01/27/18 0659  18 07 - 18 0659      [id number] 6612-0289 Total 6889-5171 5772-4352 Total 1154-6701 1828-6385 Total       Intake    P.O.  720  -- 720  220  -- 220  --  -- --    P.O. 720 -- 720 220 -- 220 -- -- --    Total Intake 720 -- 720 220 -- 220 -- -- --       Output    Urine  3000  -- 3000  1300  900 2200  --  -- --    Void (ml) 3000 -- 3000 9380 978 1438 -- -- --    Total Output 3000 -- 3000 3704 787 4703 -- -- --       Net I/O     - -- -2280 -- -- --        Weight: 65.4 kg (144 lb 2.9 oz)  Recent Labs      18   0252  18   0300   SODIUM  136  133*   POTASSIUM  3.8  5.0   CHLORIDE  102  100   CO2  23  25   BUN  12  13   CREATININE  0.92  0.80   CALCIUM  9.0  9.3       GI/Nutrition:  Exam: abdomen is soft and non-tender  Imaging: neg  NPO for procedure after MN  Liver Function  Recent Labs      18   02518   1623  18   0300   ALTSGPT   --   9   --    ASTSGOT   --   23   --    ALKPHOSPHAT   --   73   --    TBILIRUBIN   --   0.4   --    DBILIRUBIN   --   <0.1   --    GLUCOSE  100*   --   187*       Heme:  Recent Labs      18   RBC  3.55*   HEMOGLOBIN  10.5*   HEMATOCRIT  32.9*   PLATELETCT  334       Infectious Disease:  Temp  Av.6 °C (97.8 °F)  Min: 36.2 °C (97.1 °F)  Max: 37.1 °C (98.8 °F)  Micro: cultures reviewed  Recent Labs      18   0332  18   1623   WBC  9.2   --    NEUTSPOLYS  66.20   --    LYMPHOCYTES  14.60*   --    MONOCYTES  9.70   --    EOSINOPHILS  8.90*   --    BASOPHILS  0.30   --    ASTSGOT   --   23   ALTSGPT   --   9   ALKPHOSPHAT   --   73   TBILIRUBIN   --   0.4     Current Facility-Administered Medications   Medication Dose Frequency Provider Last Rate Last Dose   • amitriptyline (ELAVIL) tablet 100 mg  100 mg QHS Juan Myles M.D.   100 mg at 18   • heparin injection 5,000 Units  5,000 Units Q8HRS Juan Myles M.D.   5,000 Units at 18 0638   • benzocaine-menthol (CEPACOL)  lozenge 1 Lozenge  1 Lozenge Q2HRS PRN Juan Myles M.D.       • ipratropium-albuterol (DUONEB) nebulizer solution 3 mL  3 mL 4X/DAY (RT) Juan Myles M.D.   3 mL at 01/27/18 1026   • ipratropium-albuterol (DUONEB) nebulizer solution 3 mL  3 mL Q4H PRN (RT) Juan Myles M.D.       • methylPREDNISolone sod succ (SOLU-MEDROL) 125 MG injection 62.5 mg  62.5 mg Q8HRS Kory Winters M.D.   62.5 mg at 01/27/18 0638   • oxycodone-acetaminophen (PERCOCET) 5-325 MG per tablet 1 Tab  1 Tab Q4HRS PRN Juan Myles M.D.   1 Tab at 01/26/18 2012   • furosemide (LASIX) injection 20 mg  20 mg BID DIURETIC Juan Myles M.D.   20 mg at 01/27/18 0639   • levofloxacin (LEVAQUIN) tablet 750 mg  750 mg Q48HRS Juan Myles M.D.   750 mg at 01/26/18 1050   • guaiFENesin (ROBITUSSIN) 100 MG/5ML solution 200 mg  10 mL Q6HRS Juan Myles M.D.   200 mg at 01/27/18 0638   • fluoxetine (PROZAC) capsule 20 mg  20 mg DAILY Guicho Alan M.D.   20 mg at 01/27/18 0859   • levothyroxine (SYNTHROID) tablet 25 mcg  25 mcg AM ES Guicho Alan M.D.   25 mcg at 01/27/18 0638   • pregabalin (LYRICA) capsule 150 mg  150 mg Q EVENING Guicho Alan M.D.   150 mg at 01/26/18 2124   • senna-docusate (PERICOLACE or SENOKOT S) 8.6-50 MG per tablet 2 Tab  2 Tab BID Guicho Alan M.D.   2 Tab at 01/27/18 0859    And   • polyethylene glycol/lytes (MIRALAX) PACKET 1 Packet  1 Packet QDAY PRN Guicho Alan M.D.        And   • magnesium hydroxide (MILK OF MAGNESIA) suspension 30 mL  30 mL QDAY PRN Guicho Alan M.D.        And   • bisacodyl (DULCOLAX) suppository 10 mg  10 mg QDAY PRN Guicho Alan M.D.       • Respiratory Care per Protocol   Continuous RT Guicho Alan M.D.       • acetaminophen (TYLENOL) tablet 650 mg  650 mg Q6HRS PRN Guicho Alan M.D.   650 mg at 01/25/18 1308   • labetalol (NORMODYNE,TRANDATE) injection 10 mg  10 mg Q4HRS PRN Guicho Alan M.D.       • ondansetron (ZOFRAN)  syringe/vial injection 4 mg  4 mg Q4HRS PRN Guicho Alan M.D.       • ondansetron (ZOFRAN ODT) dispertab 4 mg  4 mg Q4HRS PRN Guicho Alan M.D.       • potassium chloride SA (Kdur) tablet 20 mEq  20 mEq BID Guicho Alan M.D.   20 mEq at 01/27/18 0859     Last reviewed on 1/22/2018  7:21 PM by Arely Silva T    Quality  Measures:  Core Measures & Quality Metrics    Problems:  Hypoxemic respiratory failure - presumed CAP on levaquin      ? ILD       Metastatic disease ?  Moderately severe pulmonary HTN  Hx HTN, hypothyroidism  Anemia  depression    Plan:  Discussed bronch with bal to assess etiology  Continue steroids  Pt agrees and understands pros and cons of procedure

## 2018-01-27 NOTE — RESPIRATORY CARE
COPD EDUCATION by COPD CLINICAL EDUCATOR  1/27/2018 at 7:11 AM by Bess Del Cid     Patient reviewed by COPD education team. Patient does not qualify for COPD program.

## 2018-01-27 NOTE — CARE PLAN
Problem: Oxygenation:  Goal: Maintain adequate oxygenation dependent on patient condition  Outcome: PROGRESSING SLOWER THAN EXPECTED    Intervention: Manage oxygen therapy by monitoring pulse oximetry and/or ABG values  5-8 lpm nc and oxymask      Problem: Bronchoconstriction:  Goal: Improve in air movement and diminished wheezing  Outcome: PROGRESSING AS EXPECTED    Intervention: Implement inhaled treatments  DUO QID, pl re-attempt hyperinflation therapy with pt when saturation are better maintained

## 2018-01-27 NOTE — PROGRESS NOTES
Report received at bedside, patient care assumed, tele box on. Pt AAO x 4, no signs of distress noted at this time. POC discussed with pt and all questions answered. Pt c/o 8/10 pain in back and head. Medicated per MAR. Pt denies any additional needs at this time. Bed in lowest position, bed alarm on, pt educated on fall risk and verbalized understanding. Call light and personal possessions within reach. Will continue to monitor.

## 2018-01-27 NOTE — PROGRESS NOTES
"Received patient from night shift nurse. Assessment complete. A&Ox4. Denies pain. Call light within reach. All needs attended to at this time. Bed in low position, treaded slippers on feet.     Pt states she only has a bowel movement q2 weeks. Inquired about pt's average meal. Pt states \"I eat chunky raisin bran and a protein shake\". More investigating pt states she will sometimes eat \"some vegetables\".  "

## 2018-01-27 NOTE — CARE PLAN
Problem: Safety  Goal: Will remain free from injury    Intervention: Provide assistance with mobility  Educated pt on safety precautions and pt has verbalized understanding. Pt has demonstrated proper use of the call light and calls appropriately. Pt is wearing non slip socks, in bed in the low locked position and the call light is within reach      Problem: Bowel/Gastric:  Goal: Normal bowel function is maintained or improved    Intervention: Educate patient and significant other/support system about diet, fluid intake, medications and activity to promote bowel function  Educated pt on senna/colace and dietary adjustments to encourage bowel habits

## 2018-01-28 ENCOUNTER — APPOINTMENT (OUTPATIENT)
Dept: RADIOLOGY | Facility: MEDICAL CENTER | Age: 78
DRG: 196 | End: 2018-01-28
Attending: INTERNAL MEDICINE
Payer: COMMERCIAL

## 2018-01-28 LAB
ANION GAP SERPL CALC-SCNC: 12 MMOL/L (ref 0–11.9)
APPEARANCE FLD: NORMAL
BODY FLD TYPE: NORMAL
BUN SERPL-MCNC: 25 MG/DL (ref 8–22)
CALCIUM SERPL-MCNC: 9.4 MG/DL (ref 8.5–10.5)
CHLORIDE SERPL-SCNC: 98 MMOL/L (ref 96–112)
CO2 SERPL-SCNC: 24 MMOL/L (ref 20–33)
COLOR FLD: NORMAL
CREAT SERPL-MCNC: 0.94 MG/DL (ref 0.5–1.4)
CSF COMMENTS 1658: NORMAL
GLUCOSE SERPL-MCNC: 161 MG/DL (ref 65–99)
GRAM STN SPEC: NORMAL
INR PPP: 1.18 (ref 0.87–1.13)
MAGNESIUM SERPL-MCNC: 1.6 MG/DL (ref 1.5–2.5)
NUCLEAR IGG SER QL IA: NORMAL
PHOSPHATE SERPL-MCNC: 4.2 MG/DL (ref 2.5–4.5)
POTASSIUM SERPL-SCNC: 4.7 MMOL/L (ref 3.6–5.5)
PROTHROMBIN TIME: 14.7 SEC (ref 12–14.6)
RBC # FLD: 1183 CELLS/UL
RHODAMINE-AURAMINE STN SPEC: NORMAL
SIGNIFICANT IND 70042: NORMAL
SIGNIFICANT IND 70042: NORMAL
SITE SITE: NORMAL
SITE SITE: NORMAL
SODIUM SERPL-SCNC: 134 MMOL/L (ref 135–145)
SOURCE SOURCE: NORMAL
SOURCE SOURCE: NORMAL
WBC # FLD: 828 CELLS/UL

## 2018-01-28 PROCEDURE — 700102 HCHG RX REV CODE 250 W/ 637 OVERRIDE(OP): Performed by: HOSPITALIST

## 2018-01-28 PROCEDURE — 36415 COLL VENOUS BLD VENIPUNCTURE: CPT

## 2018-01-28 PROCEDURE — A9270 NON-COVERED ITEM OR SERVICE: HCPCS | Performed by: HOSPITALIST

## 2018-01-28 PROCEDURE — 86738 MYCOPLASMA ANTIBODY: CPT

## 2018-01-28 PROCEDURE — 3E1F88X IRRIGATION OF RESPIRATORY TRACT USING IRRIGATING SUBSTANCE, VIA NATURAL OR ARTIFICIAL OPENING ENDOSCOPIC, DIAGNOSTIC: ICD-10-PCS | Performed by: INTERNAL MEDICINE

## 2018-01-28 PROCEDURE — 160048 HCHG OR STATISTICAL LEVEL 1-5: Performed by: INTERNAL MEDICINE

## 2018-01-28 PROCEDURE — 89051 BODY FLUID CELL COUNT: CPT

## 2018-01-28 PROCEDURE — 87106 FUNGI IDENTIFICATION YEAST: CPT

## 2018-01-28 PROCEDURE — 700111 HCHG RX REV CODE 636 W/ 250 OVERRIDE (IP)

## 2018-01-28 PROCEDURE — 86713 LEGIONELLA ANTIBODY: CPT

## 2018-01-28 PROCEDURE — 88305 TISSUE EXAM BY PATHOLOGIST: CPT

## 2018-01-28 PROCEDURE — 87116 MYCOBACTERIA CULTURE: CPT

## 2018-01-28 PROCEDURE — 85610 PROTHROMBIN TIME: CPT

## 2018-01-28 PROCEDURE — 87015 SPECIMEN INFECT AGNT CONCNTJ: CPT

## 2018-01-28 PROCEDURE — 87102 FUNGUS ISOLATION CULTURE: CPT

## 2018-01-28 PROCEDURE — 302978 HCHG BRONCHOSCOPY-DIAGNOSTIC

## 2018-01-28 PROCEDURE — 700111 HCHG RX REV CODE 636 W/ 250 OVERRIDE (IP): Performed by: HOSPITALIST

## 2018-01-28 PROCEDURE — 88112 CYTOPATH CELL ENHANCE TECH: CPT

## 2018-01-28 PROCEDURE — 87205 SMEAR GRAM STAIN: CPT

## 2018-01-28 PROCEDURE — 99232 SBSQ HOSP IP/OBS MODERATE 35: CPT | Performed by: HOSPITALIST

## 2018-01-28 PROCEDURE — 87206 SMEAR FLUORESCENT/ACID STAI: CPT

## 2018-01-28 PROCEDURE — 87070 CULTURE OTHR SPECIMN AEROBIC: CPT

## 2018-01-28 PROCEDURE — 770020 HCHG ROOM/CARE - TELE (206)

## 2018-01-28 PROCEDURE — 99152 MOD SED SAME PHYS/QHP 5/>YRS: CPT | Performed by: INTERNAL MEDICINE

## 2018-01-28 PROCEDURE — 700101 HCHG RX REV CODE 250: Performed by: HOSPITALIST

## 2018-01-28 PROCEDURE — 94640 AIRWAY INHALATION TREATMENT: CPT

## 2018-01-28 PROCEDURE — 71045 X-RAY EXAM CHEST 1 VIEW: CPT

## 2018-01-28 PROCEDURE — 80048 BASIC METABOLIC PNL TOTAL CA: CPT

## 2018-01-28 PROCEDURE — 83735 ASSAY OF MAGNESIUM: CPT

## 2018-01-28 PROCEDURE — 84100 ASSAY OF PHOSPHORUS: CPT

## 2018-01-28 PROCEDURE — 700111 HCHG RX REV CODE 636 W/ 250 OVERRIDE (IP): Performed by: INTERNAL MEDICINE

## 2018-01-28 RX ORDER — MIDAZOLAM HYDROCHLORIDE 1 MG/ML
INJECTION INTRAMUSCULAR; INTRAVENOUS
Status: DISCONTINUED | OUTPATIENT
Start: 2018-01-28 | End: 2018-01-28 | Stop reason: HOSPADM

## 2018-01-28 RX ORDER — METHYLPREDNISOLONE SODIUM SUCCINATE 125 MG/2ML
62.5 INJECTION, POWDER, LYOPHILIZED, FOR SOLUTION INTRAMUSCULAR; INTRAVENOUS EVERY 12 HOURS
Status: DISCONTINUED | OUTPATIENT
Start: 2018-01-28 | End: 2018-01-29

## 2018-01-28 RX ADMIN — METHYLPREDNISOLONE SODIUM SUCCINATE 62.5 MG: 125 INJECTION, POWDER, FOR SOLUTION INTRAMUSCULAR; INTRAVENOUS at 05:22

## 2018-01-28 RX ADMIN — IPRATROPIUM BROMIDE AND ALBUTEROL SULFATE 3 ML: .5; 3 SOLUTION RESPIRATORY (INHALATION) at 20:28

## 2018-01-28 RX ADMIN — GUAIFENESIN 200 MG: 100 SOLUTION ORAL at 17:04

## 2018-01-28 RX ADMIN — POTASSIUM CHLORIDE 20 MEQ: 1500 TABLET, EXTENDED RELEASE ORAL at 21:45

## 2018-01-28 RX ADMIN — POTASSIUM CHLORIDE 20 MEQ: 1500 TABLET, EXTENDED RELEASE ORAL at 11:23

## 2018-01-28 RX ADMIN — GUAIFENESIN 200 MG: 100 SOLUTION ORAL at 11:25

## 2018-01-28 RX ADMIN — BENZOCAINE AND MENTHOL 1 LOZENGE: 15; 3.6 LOZENGE ORAL at 11:25

## 2018-01-28 RX ADMIN — FUROSEMIDE 20 MG: 10 INJECTION, SOLUTION INTRAMUSCULAR; INTRAVENOUS at 05:22

## 2018-01-28 RX ADMIN — OXYCODONE HYDROCHLORIDE AND ACETAMINOPHEN 1 TABLET: 5; 325 TABLET ORAL at 05:21

## 2018-01-28 RX ADMIN — HEPARIN SODIUM 5000 UNITS: 5000 INJECTION, SOLUTION INTRAVENOUS; SUBCUTANEOUS at 21:44

## 2018-01-28 RX ADMIN — IPRATROPIUM BROMIDE AND ALBUTEROL SULFATE 3 ML: .5; 3 SOLUTION RESPIRATORY (INHALATION) at 14:42

## 2018-01-28 RX ADMIN — METHYLPREDNISOLONE SODIUM SUCCINATE 62.5 MG: 125 INJECTION, POWDER, FOR SOLUTION INTRAMUSCULAR; INTRAVENOUS at 21:44

## 2018-01-28 RX ADMIN — OXYCODONE HYDROCHLORIDE AND ACETAMINOPHEN 1 TABLET: 5; 325 TABLET ORAL at 17:04

## 2018-01-28 RX ADMIN — ONDANSETRON 4 MG: 2 INJECTION INTRAMUSCULAR; INTRAVENOUS at 22:28

## 2018-01-28 RX ADMIN — IPRATROPIUM BROMIDE AND ALBUTEROL SULFATE 3 ML: .5; 3 SOLUTION RESPIRATORY (INHALATION) at 10:46

## 2018-01-28 RX ADMIN — BENZOCAINE AND MENTHOL 1 LOZENGE: 15; 3.6 LOZENGE ORAL at 17:11

## 2018-01-28 RX ADMIN — IPRATROPIUM BROMIDE AND ALBUTEROL SULFATE 3 ML: .5; 3 SOLUTION RESPIRATORY (INHALATION) at 06:43

## 2018-01-28 RX ADMIN — LEVOFLOXACIN 750 MG: 750 TABLET, FILM COATED ORAL at 11:23

## 2018-01-28 RX ADMIN — HEPARIN SODIUM 5000 UNITS: 5000 INJECTION, SOLUTION INTRAVENOUS; SUBCUTANEOUS at 05:22

## 2018-01-28 RX ADMIN — LEVOTHYROXINE SODIUM 25 MCG: 25 TABLET ORAL at 05:21

## 2018-01-28 RX ADMIN — AMITRIPTYLINE HYDROCHLORIDE 100 MG: 50 TABLET, FILM COATED ORAL at 21:44

## 2018-01-28 RX ADMIN — GUAIFENESIN 200 MG: 100 SOLUTION ORAL at 23:46

## 2018-01-28 RX ADMIN — FUROSEMIDE 20 MG: 10 INJECTION, SOLUTION INTRAMUSCULAR; INTRAVENOUS at 17:04

## 2018-01-28 RX ADMIN — PREGABALIN 150 MG: 150 CAPSULE ORAL at 21:45

## 2018-01-28 RX ADMIN — FLUOXETINE HYDROCHLORIDE 20 MG: 20 CAPSULE ORAL at 11:24

## 2018-01-28 RX ADMIN — GUAIFENESIN 200 MG: 100 SOLUTION ORAL at 05:21

## 2018-01-28 ASSESSMENT — ENCOUNTER SYMPTOMS
SHORTNESS OF BREATH: 1
HEARTBURN: 0
SPUTUM PRODUCTION: 0
COUGH: 0
MYALGIAS: 0
NAUSEA: 0
BRUISES/BLEEDS EASILY: 0
BLURRED VISION: 0
HEMOPTYSIS: 0
DEPRESSION: 0
DIZZINESS: 0
HEADACHES: 0

## 2018-01-28 ASSESSMENT — PAIN SCALES - GENERAL
PAINLEVEL_OUTOF10: 0
PAINLEVEL_OUTOF10: 7
PAINLEVEL_OUTOF10: 8

## 2018-01-28 NOTE — PROGRESS NOTES
Pt back from procedure. Monitor room notified and verified tele box. Instructed pt to remain NPO until 1040 per MD. Pt coughing slightly. Pt is A&Ox4. No c/o. Family at bedside. Post procedure vitals started.

## 2018-01-28 NOTE — RESPIRATORY CARE
Pt had bronchoscopy per Dr. Winters. Pt received on 8 LPM. Pt pre-procedure ETCO2: 19 mmHg, during procedure ETCO2: 22-27 mmHg. Pt had minor desaturation during procedure and required bagging x <10 sec, airway was maintained. SpO2 stabilized and pt dr the continuation of the procedure.. Pt returned to unit on 8 LPM VSS.

## 2018-01-28 NOTE — PROGRESS NOTES
Received patient from night shift nurse. Assessment complete. A&Ox4. Denies pain. Call light within reach. All needs attended to at this time. Bed in low position, treaded slippers on feet. Pt stated she didn't sleep well last night.   Pt going to bronchoscopy with transport on 7L facemask

## 2018-01-28 NOTE — PROCEDURES
DATE OF OPERATION: 1/28/2018     PREOPERATIVE DIAGNOSIS: abnormal cxr     POSTOPERATIVE DIAGNOSIS: same     PROCEDURE PERFORMED: Fiberoptic bronchoscopy with wash    SURGEON: Kory Winters M.D.    ANESTHESIA:  Moderate sedation and local anesthesia    INDICATIONS: The patient is a 77 y.o. female with patchy diffuse infiltrates and hypoxia    FINDINGS: Moderate secretions  RUL, RML, RLL, ROSSY and LLL and edematous slightly bloody airways    SPECIMEN: wash 12 ml    PROCEDURE:     Informed consent obtained from patient or designated decision maker after explaining the benefits/risks of the procedure including but not limited to bleeding, infection, airway trauma or loss therof, pneumothorax/hemothorax, arrythmia, or death. Patient or surrogate expressed understanding and agreement.    Following informed consent, a time out was performed.  The patient was properly identified and optimally positioned in bed. She was preoxygenated with 100% oxygen  Intravenous sedation and analgesia medications were administered.    The fiberoptic bronchoscope was advanced through the oral airway.   Vocal cord sprayed with 2 ml local anesthetic.  2 mL of local anesthetic sprayed at the christi (2% lidocaine) achieving appropriate comfort level for patient. Airways visualized directly and the following intervention was performed: wash as pt not thought able to tolerate BAL. Findings as below.     Medications used: fentanyl 100 mcg, versed 1 mg    Findings: Upper airway including vocal cords nl        Trachea to christi - nl appearing mucosa without lesions or mass, Christi sharp        R proximal and distal airways - mildly edematous appearing mucosa without mass/lesion/anatomic variance, secretions: thin, white        L proximal and distal airways - mildly edematous appearing mucosa without mass/lesion/anatomic variance, secretions: thin white        Samples - for cultures and cell count    Complications: brief hypoxia and short seizure  thought secondary to fentanyl, ambu bag of pt required during seizure like activity  CXR (if applicable): ordered - bilateral diffuse patchy infiltrates    Alert and responsive post procedure and able to follow commands     ____________________________________   Kory Winters M.D.    DD: 1/28/2018  9:05 AM

## 2018-01-28 NOTE — FLOWSHEET NOTE
"   01/28/18 0924   General Vent Information   Pulse Oximetry 92 %   Heart Rate (Monitored) 77   Bronchoscopy Procedure   Bronchoscopy Procedure Yes   Pre-Op Teaching Yes   Consent Signed Yes   Time Out Performed Yes   Medication Allergy Reviewed Yes   Procedure Performed in Bronch Suite? Yes   If yes, Negative Pressure Reading (\"[place]\" present during procedure )   #Diagnostic Procedure  Yes   Start Time 0838   End Time 0853   Performing Physician Dr. Winters   Indication(s) for Procedure Identified by Physician Other  (Pneumonia )   Washing(s) Cytology;AFB;Fungi;Respiratory Culture   Other (Legionella and respiratory virus panel )   Specimen(s) Identified / Labeled for Location [facility]   Scope Serial Number [id number]   Post Procedure Response Pt rd procedure well.      "

## 2018-01-28 NOTE — PROGRESS NOTES
[facility] Hospitalist Progress Note    Date of Service: 2018    Chief Complaint  77 y.o. female admitted 2018 with pneumonia and respiratory failure.    Interval Problem Update  In bed, s/p bronchoscopy, tolerated well, no new complains, no fever sob stable.   Consultants/Specialty  Pulm.     Disposition  Tbd.         Review of Systems   Eyes: Negative for blurred vision.   Respiratory: Positive for shortness of breath. Negative for cough, hemoptysis and sputum production.    Cardiovascular: Negative for chest pain.   Gastrointestinal: Negative for heartburn and nausea.   Genitourinary: Negative for dysuria and urgency.   Musculoskeletal: Negative for myalgias.   Neurological: Negative for dizziness and headaches.   Endo/Heme/Allergies: Does not bruise/bleed easily.   Psychiatric/Behavioral: Negative for depression.      Physical Exam  Laboratory/Imaging   Hemodynamics  Temp (24hrs), Av.8 °C (98.2 °F), Min:36.3 °C (97.3 °F), Max:37.2 °C (98.9 °F)   Temperature: 37.2 °C (98.9 °F)  Pulse  Av.8  Min: 61  Max: 100 Heart Rate (Monitored): 74  Blood Pressure : 121/72, NIBP: 122/60      Respiratory      Respiration: 16, Pulse Oximetry: 98 %, O2 Daily Delivery Respiratory : Silicone Nasal Cannula     Given By:: Mouthpiece, Work Of Breathing / Effort: Mild  RUL Breath Sounds: Fine Crackles, RML Breath Sounds: Fine Crackles, RLL Breath Sounds: Diminished, ROSSY Breath Sounds: Fine Crackles, LLL Breath Sounds: Diminished    Fluids    Intake/Output Summary (Last 24 hours) at 18 1239  Last data filed at 18 1714   Gross per 24 hour   Intake              240 ml   Output              400 ml   Net             -160 ml       Nutrition  Orders Placed This Encounter   Procedures   • NPO 2 Hours     Standing Status:   Standing     Number of Occurrences:   1     Order Specific Question:   Restrict to:     Answer:   Strict [1]     Physical Exam   Constitutional: She is oriented to person, place, and time. No  distress.   Cardiovascular: Normal rate, regular rhythm and normal heart sounds.    Pulmonary/Chest: Effort normal. No respiratory distress. She has no wheezes. She has rales (ronchi ).   Crackles b/l   Abdominal: Soft. Bowel sounds are normal. She exhibits no distension. There is no tenderness.   Musculoskeletal: Normal range of motion.   Neurological: She is oriented to person, place, and time. She exhibits normal muscle tone.   Skin: No erythema.   Psychiatric: She has a normal mood and affect.   Nursing note and vitals reviewed.      Recent Labs      01/27/18   1107   WBC  8.7   RBC  3.85*   HEMOGLOBIN  11.3*   HEMATOCRIT  35.3*   MCV  91.7   MCH  29.4   MCHC  32.0*   RDW  40.5   PLATELETCT  372   MPV  8.4*     Recent Labs      01/26/18   0252  01/27/18   0300  01/28/18   0212   SODIUM  136  133*  134*   POTASSIUM  3.8  5.0  4.7   CHLORIDE  102  100  98   CO2  23  25  24   GLUCOSE  100*  187*  161*   BUN  12  13  25*   CREATININE  0.92  0.80  0.94   CALCIUM  9.0  9.3  9.4     Recent Labs      01/27/18   1107  01/28/18   0212   APTT  37.7*   --    INR   --   1.18*                  Assessment/Plan     Hypokalemia- (present on admission)   Assessment & Plan    Patient's potassium is low at 3.4 I have given her 20 mg of potassium twice daily potassium levels will be followed carefully and continue supplementation until she is in the therapeutic range.  Resolved.         CAP (community acquired pneumonia)- (present on admission)   Assessment & Plan    Patient on physical examination as well as on imaging studies has bilateral pneumonia. The patient states that she's been sick for about the past 7-10 days. Patient in the emergency room was evaluated and found to have respiratory difficulties. The patient is coughing with sputum production at this point blood cultures and sputum cultures have been requested. Patient with her penicillin allergy is placed at this point on IV Levaquin. Patient will be monitored at this  point for improvement we will also place her on RT protocol and oxygen protocol.  On levaquin, continue o2 per protocol, added cough medication.   Still on 2.5L of o2 asked to use IS more often.   On 6L of o2, cxr showed worsen b/l infiltrate that could be pneumonia vs edema started on iv lasix today echo still pending.   Consulted pulmonologist today due to increasing o2 requirements, cxr showing mild improvement. Continue atb for now.   Continue atb for now, might need bronchoscopy.   S/p Fiberoptic bronchoscopy with wash on 1/28. Continue steroids as per pulm.        Hypertension- (present on admission)   Assessment & Plan    Patient's blood pressure management will be optimized we will keep her systolic blood pressure under 140 diastolic under 90. Patient resumes on Coreg 25 mg twice daily and Hydrocort thiazide 25 mg daily as well as lisinopril 5 mg daily.  Had low bp this morning better after iv bolus 500cc. Likely orthostatic   Will hold her bp med for now.   Keep holding bp meds for now.   Stable. Will see if I can restart bp meds in am.         Acute respiratory failure with hypoxia (CMS-HCC)- (present on admission)   Assessment & Plan    Multifactorial due to pneumonia, pulmonary edema, ARDS?  On 7-8 L of o2, pulmonologist consulted.   Will probably need bronchoscopy.   S/p Fiberoptic bronchoscopy with wash, still on 5L of o2, stable. On solu-medrol bid per pulm        Normochromic normocytic anemia- (present on admission)   Assessment & Plan    Monitor H&H if she drops 7 or 21 or becomes unstable transfuse.  Hb stable.         Hyperlipidemia with target LDL less than 100- (present on admission)   Assessment & Plan    Continued low-fat low-cholesterol diet, monitor fasting lipid panel periodically.  F/u as outpatient.           Anxiety and depression- (present on admission)   Assessment & Plan    Patient is on Prozac for her depression, she is also on Elavil at nighttime.  Stable.         Fibromyalgia-  (present on admission)   Assessment & Plan    Continue with pain management for her fibromyalgia. Patient remains on Lyrica.        Hypothyroid- (present on admission)   Assessment & Plan    -supportive measures with synthroid supplementation at 25 mcg per day, no change  -latest TSH is 0.730 and this is with in establish normal guidlines   Stable.             Reviewed items::  Labs reviewed, Medications reviewed and Radiology images reviewed  DVT prophylaxis - mechanical:  SCDs  Antibiotics:  Treating active infection/contamination beyond 24 hours perioperative coverage

## 2018-01-28 NOTE — PROGRESS NOTES
Report received at bedside, patient care assumed, tele box on. Pt AAO x 4, no signs of distress noted at this time. POC discussed with pt and all questions answered. Pt c/o intermittent pain with breathing, declines intervention at this time. Pt denies any additional needs at this time. Bed in lowest position, bed alarm off, pt calls appropriately, pt educated on fall risk and verbalized understanding. Call light and personal possessions within reach. Will continue to monitor.

## 2018-01-28 NOTE — CARE PLAN
Problem: Oxygenation:  Goal: Maintain adequate oxygenation dependent on patient condition  Outcome: PROGRESSING AS EXPECTED    Intervention: Levels of oxygenation will improve to baseline  Pt is on 5LPM   SPO2 98%      Problem: Bronchoconstriction:  Goal: Improve in air movement and diminished wheezing  Outcome: PROGRESSING AS EXPECTED    Intervention: Evaluate and manage medication effects  DURADHA JACKSON

## 2018-01-28 NOTE — PROGRESS NOTES
Pulmonary Critical Care Progress Note        Chief Complaint: continues to be sob and weak    History of Present Illness: 77 y.o. female admitted for sob most acute over prior 2 weeks but increasing symptoms including fatigue for at least 6 months.  Also diffuse chest discomfort and cough.   No sx of CVD or known malignancy.  No birds or HIV risks.  No significant weight loss or change in bowel habits.      ROS:  Respiratory: positive shortness of breath, Cardiac: negative, GI: negative.  All other systems negative.    Interval Events:  24 hour interval history reviewed    PFSH:  No change.    Respiratory:     Pulse Oximetry: 95 %            Exam: decrease in rales although still present in lower lung fields, able to speak more than previously  Imaging  CT reviewed  1.  Hazy and groundglass bilateral opacities suggests underlying edema and/or infiltrates.  2.  Enlarged mediastinal lymph nodes, consider causes of adenopathy with additional workup as clinically appropriate.  3.  Atherosclerosis  4.  Cholelithiasis  5.  Small hiatal hernia  6.  Diverticulosis  7.  Bilateral pulmonary nodules measuring up to 10.5 mm. See nodule follow-up recommendations below  Recent Labs      01/25/18   1739  01/26/18   1126   ATYAH58E  7.42  7.47   FCEJJL400B  37.8*  33.6   EXYML360H  58.6*  66.8   DITA1ZMQ  90.0*  93.7   ARTHCO3  24  24   ARTBE  0  1       HemoDynamics:  Pulse: 74, Heart Rate (Monitored): 74  Blood Pressure : 115/74, NIBP: 122/60       Exam: regular rate and rhythm  Imaging: Available data reviewed   ECHO-  Normal left ventricular chamber size.  Left ventricular ejection fraction is visually estimated to be 60%.  Moderately dilated right ventricle.  Normal right ventricular systolic function.  Moderate tricuspid regurgitation.   Right ventricular systolic pressure is estimated to be 65-70 mmHg        Neuro:  GCS  15       Exam: no focal deficits noted  Imaging: None - Reviewed    Fluids:  Intake/Output       01/26/18  07 - 18 0659 18 07 - 18 0659 18 07 - 18 0659      [id number] 3436-9371 Total  Total  Total       Intake    P.O.  220  -- 220  480  -- 480  --  -- --    P.O. 220 -- 220 480 -- 480 -- -- --    Total Intake 220 -- 220 480 -- 480 -- -- --       Output    Urine  1300  900 2200  1000  -- 1000  --  -- --    Number of Times Voided -- -- -- 2 x -- 2 x -- -- --    Void (ml) 9640 142 2630 1000 -- 1000 -- -- --    Total Output 1732 261 9744 1000 -- 1000 -- -- --       Net I/O     -1080 -900 - -520 -- -520 -- -- --           Recent Labs      18   02518   03018   SODIUM  136  133*  134*   POTASSIUM  3.8  5.0  4.7   CHLORIDE  102  100  98   CO2  23  25  24   BUN  12  13  25*   CREATININE  0.92  0.80  0.94   MAGNESIUM   --    --   1.6   PHOSPHORUS   --    --   4.2   CALCIUM  9.0  9.3  9.4       GI/Nutrition:  Exam: abdomen is soft and non-tender  Imaging: neg  NPO for procedure after MN  Liver Function  Recent Labs      18   0252  18   1623  18   0300  18   ALTSGPT   --   9   --    --    ASTSGOT   --   23   --    --    ALKPHOSPHAT   --   73   --    --    TBILIRUBIN   --   0.4   --    --    DBILIRUBIN   --   <0.1   --    --    GLUCOSE  100*   --   187*  161*       Heme:  Recent Labs      18   1107  18   RBC  3.85*   --    HEMOGLOBIN  11.3*   --    HEMATOCRIT  35.3*   --    PLATELETCT  372   --    PROTHROMBTM   --   14.7*   APTT  37.7*   --    INR   --   1.18*       Infectious Disease:  Temp  Av.7 °C (98 °F)  Min: 36.3 °C (97.3 °F)  Max: 37 °C (98.6 °F)  Micro: cultures reviewed  Recent Labs      18   1623  18   1107   WBC   --   8.7   NEUTSPOLYS   --   88.20*   LYMPHOCYTES   --   9.80*   MONOCYTES   --   1.00   EOSINOPHILS   --   0.00   BASOPHILS   --   0.10   ASTSGOT  23   --    ALTSGPT  9   --    ALKPHOSPHAT  73   --    TBILIRUBIN  0.4   --      Current  Facility-Administered Medications   Medication Dose Frequency Provider Last Rate Last Dose   • amitriptyline (ELAVIL) tablet 100 mg  100 mg QHS Juan Myles M.D.   100 mg at 01/27/18 2111   • heparin injection 5,000 Units  5,000 Units Q8HRS Juan Myles M.D.   5,000 Units at 01/28/18 0522   • benzocaine-menthol (CEPACOL) lozenge 1 Lozenge  1 Lozenge Q2HRS PRN Juan Myles M.D.   1 Lozenge at 01/28/18 1125   • ipratropium-albuterol (DUONEB) nebulizer solution 3 mL  3 mL 4X/DAY (RT) Juan Myles M.D.   3 mL at 01/28/18 1046   • ipratropium-albuterol (DUONEB) nebulizer solution 3 mL  3 mL Q4H PRN (RT) Juan Myles M.D.       • methylPREDNISolone sod succ (SOLU-MEDROL) 125 MG injection 62.5 mg  62.5 mg Q8HRS Kory Wintesr M.D.   62.5 mg at 01/28/18 0522   • oxycodone-acetaminophen (PERCOCET) 5-325 MG per tablet 1 Tab  1 Tab Q4HRS PRN Juan Myles M.D.   1 Tab at 01/28/18 0521   • furosemide (LASIX) injection 20 mg  20 mg BID DIURETIC Juan Myles M.D.   20 mg at 01/28/18 0522   • guaiFENesin (ROBITUSSIN) 100 MG/5ML solution 200 mg  10 mL Q6HRS Juan Myles M.D.   200 mg at 01/28/18 1125   • fluoxetine (PROZAC) capsule 20 mg  20 mg DAILY Guicho Alan M.D.   20 mg at 01/28/18 1124   • levothyroxine (SYNTHROID) tablet 25 mcg  25 mcg AM ES Guicho Alan M.D.   25 mcg at 01/28/18 0521   • pregabalin (LYRICA) capsule 150 mg  150 mg Q EVENING Guicho Alan M.D.   150 mg at 01/27/18 2111   • senna-docusate (PERICOLACE or SENOKOT S) 8.6-50 MG per tablet 2 Tab  2 Tab BID Guicho Alan M.D.   2 Tab at 01/27/18 0859    And   • polyethylene glycol/lytes (MIRALAX) PACKET 1 Packet  1 Packet QDAY PRN Guicho Alan M.D.        And   • magnesium hydroxide (MILK OF MAGNESIA) suspension 30 mL  30 mL QDAY PRN Guicho Alan M.D.        And   • bisacodyl (DULCOLAX) suppository 10 mg  10 mg QDAY PRN Guicho Alan M.D.       • Respiratory Care per Protocol    Continuous RT Guicho Alan M.D.       • acetaminophen (TYLENOL) tablet 650 mg  650 mg Q6HRS PRN Guicho Alan M.D.   650 mg at 01/25/18 1308   • labetalol (NORMODYNE,TRANDATE) injection 10 mg  10 mg Q4HRS PRN Guicho Alan M.D.       • ondansetron (ZOFRAN) syringe/vial injection 4 mg  4 mg Q4HRS PRN Guicho Alan M.D.       • ondansetron (ZOFRAN ODT) dispertab 4 mg  4 mg Q4HRS PRN Guicho Alan M.D.       • potassium chloride SA (Kdur) tablet 20 mEq  20 mEq BID Guicho Alan M.D.   20 mEq at 01/28/18 1123     Last reviewed on 1/22/2018  7:21 PM by Tami Alonzo    Quality  Measures:  Core Measures & Quality Metrics    Problems:  Hypoxemic respiratory failure - presumed CAP on levaquin      Most likely a viral infection      ? ILD       Metastatic disease ? Less likely  Moderately severe pulmonary HTN  Hx HTN, hypothyroidism  Anemia  depression    Plan:  Bronch tolerated but had brief seizure with fentanyl - studies sent  Will decrease steroids  Modify treatment based on results of bronchoscopy  Will sign off to covering pulmonary in am

## 2018-01-28 NOTE — PROGRESS NOTES
[facility] Hospitalist Progress Note    Date of Service: 2018    Chief Complaint  77 y.o. female admitted 2018 with pneumonia and respiratory failure.    Interval Problem Update  Resting in bed, no new complains, no fever, sob stable.    Tolerating diet.   Consultants/Specialty  Pulm.     Disposition  Tbd.         Review of Systems   Constitutional: Negative for weight loss.   HENT: Negative for ear discharge.    Respiratory: Positive for hemoptysis and shortness of breath. Negative for cough and sputum production.    Cardiovascular: Negative for chest pain, palpitations and orthopnea.   Gastrointestinal: Negative for heartburn and nausea.   Genitourinary: Negative for dysuria and urgency.   Musculoskeletal: Negative for myalgias.   Neurological: Negative for dizziness and headaches.   Endo/Heme/Allergies: Does not bruise/bleed easily.   Psychiatric/Behavioral: Negative for depression.      Physical Exam  Laboratory/Imaging   Hemodynamics  Temp (24hrs), Av.4 °C (97.5 °F), Min:36.2 °C (97.1 °F), Max:36.7 °C (98 °F)   Temperature: 36.6 °C (97.9 °F)  Pulse  Av.1  Min: 61  Max: 99    Blood Pressure : 117/63      Respiratory      Respiration: 20, Pulse Oximetry: 94 %, O2 Daily Delivery Respiratory : OxyMask     Given By:: Mouthpiece, Work Of Breathing / Effort: Mild  RUL Breath Sounds: Clear, RML Breath Sounds: Clear, RLL Breath Sounds: Diminished, ROSSY Breath Sounds: Clear, LLL Breath Sounds: Diminished    Fluids    Intake/Output Summary (Last 24 hours) at 18 1610  Last data filed at 18 1230   Gross per 24 hour   Intake                0 ml   Output             1500 ml   Net            -1500 ml       Nutrition  Orders Placed This Encounter   Procedures   • Diet Order     Standing Status:   Standing     Number of Occurrences:   1     Order Specific Question:   Diet:     Answer:   Regular [1]   • DIET NPO     Standing Status:   Standing     Number of Occurrences:   8     Order Specific Question:    Restrict to:     Answer:   Sips with Medications [3]     Physical Exam   Constitutional: She is oriented to person, place, and time. No distress.   Neck: Neck supple.   Cardiovascular: Normal rate, regular rhythm and normal heart sounds.    Pulmonary/Chest: Effort normal. No respiratory distress. She has no wheezes. She has rales (ronchi ).   Crackles b/l   Abdominal: Soft. Bowel sounds are normal. She exhibits no distension. There is no tenderness.   Musculoskeletal: Normal range of motion.   Neurological: She is oriented to person, place, and time. She exhibits normal muscle tone.   Skin: No erythema.   Psychiatric: She has a normal mood and affect.   Nursing note and vitals reviewed.      Recent Labs      01/25/18   0332  01/27/18   1107   WBC  9.2  8.7   RBC  3.55*  3.85*   HEMOGLOBIN  10.5*  11.3*   HEMATOCRIT  32.9*  35.3*   MCV  92.7  91.7   MCH  29.6  29.4   MCHC  31.9*  32.0*   RDW  42.1  40.5   PLATELETCT  334  372   MPV  8.6*  8.4*     Recent Labs      01/26/18   0252  01/27/18   0300   SODIUM  136  133*   POTASSIUM  3.8  5.0   CHLORIDE  102  100   CO2  23  25   GLUCOSE  100*  187*   BUN  12  13   CREATININE  0.92  0.80   CALCIUM  9.0  9.3     Recent Labs      01/27/18   1107   APTT  37.7*     Recent Labs      01/25/18   0755   BNPBTYPENAT  54              Assessment/Plan     Hypokalemia- (present on admission)   Assessment & Plan    Patient's potassium is low at 3.4 I have given her 20 mg of potassium twice daily potassium levels will be followed carefully and continue supplementation until she is in the therapeutic range.  Resolved.         CAP (community acquired pneumonia)- (present on admission)   Assessment & Plan    Patient on physical examination as well as on imaging studies has bilateral pneumonia. The patient states that she's been sick for about the past 7-10 days. Patient in the emergency room was evaluated and found to have respiratory difficulties. The patient is coughing with sputum  production at this point blood cultures and sputum cultures have been requested. Patient with her penicillin allergy is placed at this point on IV Levaquin. Patient will be monitored at this point for improvement we will also place her on RT protocol and oxygen protocol.  On levaquin, continue o2 per protocol, added cough medication.   Still on 2.5L of o2 asked to use IS more often.   On 6L of o2, cxr showed worsen b/l infiltrate that could be pneumonia vs edema started on iv lasix today echo still pending.   Consulted pulmonologist today due to increasing o2 requirements, cxr showing mild improvement. Continue atb for now.   Continue atb for now, might need bronchoscopy.         Hypertension- (present on admission)   Assessment & Plan    Patient's blood pressure management will be optimized we will keep her systolic blood pressure under 140 diastolic under 90. Patient resumes on Coreg 25 mg twice daily and Hydrocort thiazide 25 mg daily as well as lisinopril 5 mg daily.  Had low bp this morning better after iv bolus 500cc. Likely orthostatic   Will hold her bp med for now.   Keep holding bp meds for now.   Stable.         Acute respiratory failure with hypoxia (CMS-HCC)- (present on admission)   Assessment & Plan    Multifactorial due to pneumonia, pulmonary edema, ARDS?  On 7-8 L of o2, pulmonologist consulted.   Will probably need bronchoscopy.         Normochromic normocytic anemia- (present on admission)   Assessment & Plan    Monitor H&H if she drops 7 or 21 or becomes unstable transfuse.  Hb stable.         Hyperlipidemia with target LDL less than 100- (present on admission)   Assessment & Plan    Continued low-fat low-cholesterol diet, monitor fasting lipid panel periodically.  F/u as outpatient.           Anxiety and depression- (present on admission)   Assessment & Plan    Patient is on Prozac for her depression, she is also on Elavil at nighttime.  Stable.         Fibromyalgia- (present on admission)    Assessment & Plan    Continue with pain management for her fibromyalgia. Patient remains on Lyrica.        Hypothyroid- (present on admission)   Assessment & Plan    -supportive measures with synthroid supplementation at 25 mcg per day, no change  -latest TSH is 0.730 and this is with in establish normal guidlines   Stable.             Reviewed items::  Labs reviewed, Medications reviewed and Radiology images reviewed  DVT prophylaxis - mechanical:  SCDs  Antibiotics:  Treating active infection/contamination beyond 24 hours perioperative coverage

## 2018-01-29 PROCEDURE — 99232 SBSQ HOSP IP/OBS MODERATE 35: CPT | Performed by: HOSPITALIST

## 2018-01-29 PROCEDURE — 700111 HCHG RX REV CODE 636 W/ 250 OVERRIDE (IP): Performed by: HOSPITALIST

## 2018-01-29 PROCEDURE — A9270 NON-COVERED ITEM OR SERVICE: HCPCS | Performed by: HOSPITALIST

## 2018-01-29 PROCEDURE — 700111 HCHG RX REV CODE 636 W/ 250 OVERRIDE (IP): Performed by: INTERNAL MEDICINE

## 2018-01-29 PROCEDURE — 700102 HCHG RX REV CODE 250 W/ 637 OVERRIDE(OP): Performed by: HOSPITALIST

## 2018-01-29 PROCEDURE — 770020 HCHG ROOM/CARE - TELE (206)

## 2018-01-29 RX ORDER — CARVEDILOL 12.5 MG/1
12.5 TABLET ORAL DAILY
Status: DISCONTINUED | OUTPATIENT
Start: 2018-01-29 | End: 2018-01-30

## 2018-01-29 RX ORDER — PREDNISONE 20 MG/1
40 TABLET ORAL DAILY
Status: DISCONTINUED | OUTPATIENT
Start: 2018-01-29 | End: 2018-02-02

## 2018-01-29 RX ADMIN — POTASSIUM CHLORIDE 20 MEQ: 1500 TABLET, EXTENDED RELEASE ORAL at 08:25

## 2018-01-29 RX ADMIN — POTASSIUM CHLORIDE 20 MEQ: 1500 TABLET, EXTENDED RELEASE ORAL at 20:13

## 2018-01-29 RX ADMIN — OXYCODONE HYDROCHLORIDE AND ACETAMINOPHEN 1 TABLET: 5; 325 TABLET ORAL at 15:44

## 2018-01-29 RX ADMIN — GUAIFENESIN 200 MG: 100 SOLUTION ORAL at 17:02

## 2018-01-29 RX ADMIN — METHYLPREDNISOLONE SODIUM SUCCINATE 62.5 MG: 125 INJECTION, POWDER, FOR SOLUTION INTRAMUSCULAR; INTRAVENOUS at 08:25

## 2018-01-29 RX ADMIN — OXYCODONE HYDROCHLORIDE AND ACETAMINOPHEN 1 TABLET: 5; 325 TABLET ORAL at 08:28

## 2018-01-29 RX ADMIN — PREGABALIN 150 MG: 150 CAPSULE ORAL at 20:13

## 2018-01-29 RX ADMIN — FUROSEMIDE 20 MG: 10 INJECTION, SOLUTION INTRAMUSCULAR; INTRAVENOUS at 17:02

## 2018-01-29 RX ADMIN — HEPARIN SODIUM 5000 UNITS: 5000 INJECTION, SOLUTION INTRAVENOUS; SUBCUTANEOUS at 05:56

## 2018-01-29 RX ADMIN — STANDARDIZED SENNA CONCENTRATE AND DOCUSATE SODIUM 2 TABLET: 8.6; 5 TABLET, FILM COATED ORAL at 08:25

## 2018-01-29 RX ADMIN — PREDNISONE 40 MG: 20 TABLET ORAL at 11:20

## 2018-01-29 RX ADMIN — GUAIFENESIN 200 MG: 100 SOLUTION ORAL at 05:55

## 2018-01-29 RX ADMIN — OXYCODONE HYDROCHLORIDE AND ACETAMINOPHEN 1 TABLET: 5; 325 TABLET ORAL at 20:13

## 2018-01-29 RX ADMIN — STANDARDIZED SENNA CONCENTRATE AND DOCUSATE SODIUM 2 TABLET: 8.6; 5 TABLET, FILM COATED ORAL at 20:13

## 2018-01-29 RX ADMIN — FUROSEMIDE 20 MG: 10 INJECTION, SOLUTION INTRAMUSCULAR; INTRAVENOUS at 05:56

## 2018-01-29 RX ADMIN — FLUOXETINE HYDROCHLORIDE 20 MG: 20 CAPSULE ORAL at 08:25

## 2018-01-29 RX ADMIN — HEPARIN SODIUM 5000 UNITS: 5000 INJECTION, SOLUTION INTRAVENOUS; SUBCUTANEOUS at 20:14

## 2018-01-29 RX ADMIN — AMITRIPTYLINE HYDROCHLORIDE 100 MG: 50 TABLET, FILM COATED ORAL at 20:12

## 2018-01-29 RX ADMIN — ACETAMINOPHEN 650 MG: 325 TABLET, FILM COATED ORAL at 16:30

## 2018-01-29 RX ADMIN — HEPARIN SODIUM 5000 UNITS: 5000 INJECTION, SOLUTION INTRAVENOUS; SUBCUTANEOUS at 13:00

## 2018-01-29 RX ADMIN — LEVOTHYROXINE SODIUM 25 MCG: 25 TABLET ORAL at 05:56

## 2018-01-29 RX ADMIN — CARVEDILOL 12.5 MG: 12.5 TABLET, FILM COATED ORAL at 12:57

## 2018-01-29 RX ADMIN — GUAIFENESIN 200 MG: 100 SOLUTION ORAL at 11:20

## 2018-01-29 ASSESSMENT — ENCOUNTER SYMPTOMS
SHORTNESS OF BREATH: 1
BLURRED VISION: 0
COUGH: 0
BRUISES/BLEEDS EASILY: 0
HEADACHES: 0
DEPRESSION: 0
MYALGIAS: 0
HEMOPTYSIS: 0
DIZZINESS: 0
HEARTBURN: 0
SPUTUM PRODUCTION: 0
NAUSEA: 0

## 2018-01-29 ASSESSMENT — LIFESTYLE VARIABLES: NAUSEA AND VOMITING: NO NAUSEA AND NO VOMITING

## 2018-01-29 ASSESSMENT — PAIN SCALES - GENERAL
PAINLEVEL_OUTOF10: 3
PAINLEVEL_OUTOF10: 5
PAINLEVEL_OUTOF10: 1
PAINLEVEL_OUTOF10: 5
PAINLEVEL_OUTOF10: 1
PAINLEVEL_OUTOF10: 4
PAINLEVEL_OUTOF10: 6

## 2018-01-29 NOTE — CARE PLAN
Problem: Respiratory:  Goal: Respiratory status will improve  Outcome: PROGRESSING AS EXPECTED  Pt instructed on IS, repeat demonstration indicates proper use, utilized during commercial breaks while awake. Pt O2 saturation maintaining in 90's on 5 L NC.

## 2018-01-29 NOTE — PROGRESS NOTES
Pulmonary Critical Care Progress Note        Chief Complaint:  Exertional dyspnea accompanied by dry cough and fatigue    History of Present Illness: 77 y.o. female admitted for sob most acute over prior 2 weeks but increasing symptoms including fatigue for at least 6 months.  Also diffuse chest discomfort and cough.   No sx of CVD or known malignancy.  No birds or HIV risks.  No significant weight loss or change in bowel habits.      01/29/18: S/p bronchoscopy with bronchial washing yesterday. Afebrile. Has non productive cough and dyspnea. No hemoptysis. Patient was being seen by Dr Winters and I have taken over the patient from today.    ROS:  Respiratory: positive shortness of breath, Cardiac: No palpitations anginal chest pain, dependent edema,, GI: Admits retrosternal burning and symptoms of reflux. No nausea or vomiting or abdominal pain. Musculoskeletal : Severe backache. All other systems negative.    Interval Events:  Status post bronchoscopy with bronchial washing done yesterday. BAL was deferred considering patients clinical condition.  24 hour interval history reviewed    PFSH:  No change.    Respiratory:      Pulse oximetery is 97% on O2 6l/min    Patient still c/o non specific sarah upper chest pain on inspiration       Exam: Bilateral end-inspiratory crepitations more in bases.  Imaging  CT reviewed  1.  Hazy and groundglass bilateral opacities suggests underlying edema and/or infiltrates.  2.  Enlarged mediastinal lymph nodes, consider causes of adenopathy with additional workup as clinically appropriate.  3.  Atherosclerosis  4.  Cholelithiasis  5.  Small hiatal hernia  6.  Diverticulosis  7.  Bilateral pulmonary nodules measuring up to 10.5 mm. See nodule follow-up recommendations below  Recent Labs      01/26/18   1126   [id number]  7.47   [id number]  33.6   HIBQW120L  66.8   [id number]  93.7   ARTHCO3  24   ARTBE  1       HemoDynamics:  Pulse: 70  Blood Pressure : 128/70       Exam: regular rate and  rhythm  Imaging: Available data reviewed   ECHO-  Normal left ventricular chamber size.  Left ventricular ejection fraction is visually estimated to be 60%.  Moderately dilated right ventricle.  Normal right ventricular systolic function.  Moderate tricuspid regurgitation.   Right ventricular systolic pressure is estimated to be 65-70 mmHg        Neuro:  GCS  15   No focal neurological deficit    Exam: no focal deficits noted  Imaging: CXR dated 02/28/18 reviewed shows perihilar prominence bilaterally with diffuse patchy opacity. No consolidation or effusion.     Fluids:  Intake/Output       01/27/18 0700 - 01/28/18 0659 01/28/18 0700 - 01/29/18 0659 01/29/18 0700 - 01/30/18 0659      [id number] Total [id number] 6562-4591 Total 5169-2460 3598-9922 Total       Intake    P.O.  480  -- 480  --  240 240  --  -- --    P.O. 480 -- 480 -- 240 240 -- -- --    Total Intake 480 -- 480 -- 240 240 -- -- --       Output    Urine  1000  -- 1000  200  450 650  --  -- --    Number of Times Voided 2 x -- 2 x -- 1 x 1 x -- -- --    Void (ml) 1000 -- 1000 200 450 650 -- -- --    Total Output 1000 -- 1000 200 450 650 -- -- --       Net I/O     -520 -- -520 -200 -210 -410 -- -- --        Weight: 64.4 kg (141 lb 15.6 oz)  Recent Labs      01/27/18   0300  01/28/18   0212   SODIUM  133*  134*   POTASSIUM  5.0  4.7   CHLORIDE  100  98   CO2  25  24   BUN  13  25*   CREATININE  0.80  0.94   MAGNESIUM   --   1.6   PHOSPHORUS   --   4.2   CALCIUM  9.3  9.4       GI/Nutrition:  Exam: abdomen is soft and non-tender. Bowel sounds audible.   Imaging: neg  Oral feeding started  Liver Function  Recent Labs      01/26/18   1623  01/27/18   0300  01/28/18   0212   ALTSGPT  9   --    --    ASTSGOT  23   --    --    ALKPHOSPHAT  73   --    --    TBILIRUBIN  0.4   --    --    DBILIRUBIN  <0.1   --    --    GLUCOSE   --   187*  161*       Heme:  Recent Labs      01/27/18   1107  01/28/18   0212   RBC  3.85*   --    HEMOGLOBIN  11.3*   --     HEMATOCRIT  35.3*   --    PLATELETCT  372   --    PROTHROMBTM   --   14.7*   APTT  37.7*   --    INR   --   1.18*       Infectious Disease:  Temp  Av.6 °C (97.8 °F)  Min: 36.3 °C (97.3 °F)  Max: 37.2 °C (98.9 °F)  Micro: cultures reviewed   BC no growth in 5 days.  Bronchial washings negative for AFB or fungal elements.  HIV antibodies negative    Recent Labs      18   1623  18   1107   WBC   --   8.7   NEUTSPOLYS   --   88.20*   LYMPHOCYTES   --   9.80*   MONOCYTES   --   1.00   EOSINOPHILS   --   0.00   BASOPHILS   --   0.10   ASTSGOT  23   --    ALTSGPT  9   --    ALKPHOSPHAT  73   --    TBILIRUBIN  0.4   --      Current Facility-Administered Medications   Medication Dose Frequency Provider Last Rate Last Dose   • methylPREDNISolone sod succ (SOLU-MEDROL) 125 MG injection 62.5 mg  62.5 mg Q12HRS Kory Winters M.D.   62.5 mg at 18 0825   • amitriptyline (ELAVIL) tablet 100 mg  100 mg QHS Juan Myles M.D.   100 mg at 18 2144   • heparin injection 5,000 Units  5,000 Units Q8HRS Juan Myles M.D.   5,000 Units at 18 0556   • benzocaine-menthol (CEPACOL) lozenge 1 Lozenge  1 Lozenge Q2HRS PRN Juan Myles M.D.   1 Lozenge at 18 1711   • ipratropium-albuterol (DUONEB) nebulizer solution 3 mL  3 mL Q4H PRN (RT) Juan Myles M.D.       • oxycodone-acetaminophen (PERCOCET) 5-325 MG per tablet 1 Tab  1 Tab Q4HRS PRN Juan Myles M.D.   1 Tab at 18 0828   • furosemide (LASIX) injection 20 mg  20 mg BID DIURETIC Juan Myles M.D.   20 mg at 18 0556   • guaiFENesin (ROBITUSSIN) 100 MG/5ML solution 200 mg  10 mL Q6HRS Juan Myles M.D.   200 mg at 18 0555   • fluoxetine (PROZAC) capsule 20 mg  20 mg DAILY Guicho Alan M.D.   20 mg at 18 0825   • levothyroxine (SYNTHROID) tablet 25 mcg  25 mcg AM ES Guicho Alan M.D.   25 mcg at 18 0556   • pregabalin (LYRICA) capsule 150 mg  150 mg Q  EVENING Guicho Alan M.D.   150 mg at 01/28/18 2145   • senna-docusate (PERICOLACE or SENOKOT S) 8.6-50 MG per tablet 2 Tab  2 Tab BID Guicho Alan M.D.   2 Tab at 01/29/18 0825    And   • polyethylene glycol/lytes (MIRALAX) PACKET 1 Packet  1 Packet QDAY PRN Guicho Alan M.D.        And   • magnesium hydroxide (MILK OF MAGNESIA) suspension 30 mL  30 mL QDAY PRN Guicho Alan M.D.        And   • bisacodyl (DULCOLAX) suppository 10 mg  10 mg QDAY PRN Guicho Alan M.D.       • Respiratory Care per Protocol   Continuous RT Guicho Alan M.D.       • acetaminophen (TYLENOL) tablet 650 mg  650 mg Q6HRS PRN Guicho Alan M.D.   650 mg at 01/25/18 1308   • labetalol (NORMODYNE,TRANDATE) injection 10 mg  10 mg Q4HRS PRN Guicho Alan M.D.       • ondansetron (ZOFRAN) syringe/vial injection 4 mg  4 mg Q4HRS PRN Guicho Alan M.D.   4 mg at 01/28/18 2228   • ondansetron (ZOFRAN ODT) dispertab 4 mg  4 mg Q4HRS PRN Guicho Alan M.D.       • potassium chloride SA (Kdur) tablet 20 mEq  20 mEq BID Guicho Alan M.D.   20 mEq at 01/29/18 0825     Last reviewed on 1/22/2018  7:21 PM by Arely Silva [facility]    Quality  Measures:  Core Measures & Quality Metrics    Problems:  Acute Hypoxemic respiratory failure improved with O2 supplementation  Abnormal CT chest with ground glass opacities likely interstitial pneumonitis/ ILD  Pulmonary edema vs hypersensitivity pneumonitis is also in differential diagnosis  Moderately severe pulmonary HTN  Hx HTN, hypothyroidism  Anemia  Depression  GERD    Plan:  Will follow bronchial washing culture results  VATS/Wedge biopsy of Lung indicated however as patient is a DNR status and hypoxic will defer invasive procedure  And start oral steroids empirically.  Discontinue Levofloxacin  Continue Oxygen supplementation and taper to maintain SpO2 above 94%  PFTs recommended.

## 2018-01-29 NOTE — PROGRESS NOTES
Pt jesús during bedside report, no complaints voiced at this time. Safety and fall precautions in place, call light in reach, will continue to monitor.

## 2018-01-29 NOTE — PROGRESS NOTES
Pt resting at this time. No complaints voiced, safety and fall precautions in place, call light in reach, will continue to monitor

## 2018-01-29 NOTE — PROGRESS NOTES
Report received at bedside, patient care assumed, tele box on. Pt AAO x 4, no signs of distress noted at this time. POC discussed with pt and all questions answered. No complaint of pain at this time. Pt denies any additional needs at this time. Bed in lowest position, bed alarm off, pt calls appropriately pt educated on fall risk and verbalized understanding. Call light and personal possessions within reach. Will continue to monitor.

## 2018-01-29 NOTE — PROGRESS NOTES
[facility]    Date of Service: 2018    Chief Complaint  77 y.o. female admitted 2018 with pneumonia and respiratory failure.    Interval Problem Update  S/p bronchoscopy on 18, feeling better, tolerating po intake.     Consultants/Specialty  Pulm.     Disposition  Tbd.         Review of Systems   Eyes: Negative for blurred vision.   Respiratory: Positive for shortness of breath. Negative for cough, hemoptysis and sputum production.    Cardiovascular: Negative for chest pain.   Gastrointestinal: Negative for heartburn and nausea.   Genitourinary: Negative for dysuria and urgency.   Musculoskeletal: Negative for myalgias.   Neurological: Negative for dizziness and headaches.   Endo/Heme/Allergies: Does not bruise/bleed easily.   Psychiatric/Behavioral: Negative for depression.      Physical Exam  Laboratory/Imaging   Hemodynamics  Temp (24hrs), Av.5 °C (97.7 °F), Min:36.3 °C (97.3 °F), Max:36.9 °C (98.4 °F)   Temperature: 36.9 °C (98.4 °F)  Pulse  Av.7  Min: 61  Max: 100    Blood Pressure : 129/74      Respiratory      Respiration: 16, Pulse Oximetry: 93 %, O2 Daily Delivery Respiratory : Silicone Nasal Cannula     Given By:: Mouthpiece, Work Of Breathing / Effort: Mild  RUL Breath Sounds: Clear, RML Breath Sounds: Diminished;Crackles, RLL Breath Sounds: Diminished;Crackles, ROSSY Breath Sounds: Clear, LLL Breath Sounds: Diminished;Crackles    Fluids    Intake/Output Summary (Last 24 hours) at 18 1217  Last data filed at 18 1000   Gross per 24 hour   Intake              540 ml   Output              650 ml   Net             -110 ml       Nutrition  Orders Placed This Encounter   Procedures   • DIET ORDER     Standing Status:   Standing     Number of Occurrences:   1     Order Specific Question:   Diet:     Answer:   Regular [1]     Physical Exam   Constitutional: She is oriented to person, place, and time. No distress.   Cardiovascular: Normal rate, regular rhythm  and normal heart sounds.    Pulmonary/Chest: Effort normal. No respiratory distress. She has no wheezes. She has rales (ronchi ).   Crackles b/l   Abdominal: Soft. Bowel sounds are normal. She exhibits no distension. There is no tenderness.   Musculoskeletal: Normal range of motion.   Neurological: She is oriented to person, place, and time. She exhibits normal muscle tone.   Skin: No erythema.   Psychiatric: She has a normal mood and affect.   Nursing note and vitals reviewed.      Recent Labs      01/27/18   1107   WBC  8.7   RBC  3.85*   HEMOGLOBIN  11.3*   HEMATOCRIT  35.3*   MCV  91.7   MCH  29.4   MCHC  32.0*   RDW  40.5   PLATELETCT  372   MPV  8.4*     Recent Labs      01/27/18   0300  01/28/18   0212   SODIUM  133*  134*   POTASSIUM  5.0  4.7   CHLORIDE  100  98   CO2  25  24   GLUCOSE  187*  161*   BUN  13  25*   CREATININE  0.80  0.94   CALCIUM  9.3  9.4     Recent Labs      01/27/18   1107  01/28/18   0212   APTT  37.7*   --    INR   --   1.18*                  Assessment/Plan     Hypokalemia- (present on admission)   Assessment & Plan    Patient's potassium is low at 3.4 I have given her 20 mg of potassium twice daily potassium levels will be followed carefully and continue supplementation until she is in the therapeutic range.  Resolved.         CAP (community acquired pneumonia)- (present on admission)   Assessment & Plan    Patient on physical examination as well as on imaging studies has bilateral pneumonia. The patient states that she's been sick for about the past 7-10 days. Patient in the emergency room was evaluated and found to have respiratory difficulties. The patient is coughing with sputum production at this point blood cultures and sputum cultures have been requested. Patient with her penicillin allergy is placed at this point on IV Levaquin. Patient will be monitored at this point for improvement we will also place her on RT protocol and oxygen protocol.  On levaquin, continue o2 per  protocol, added cough medication.   Still on 2.5L of o2 asked to use IS more often.   On 6L of o2, cxr showed worsen b/l infiltrate that could be pneumonia vs edema started on iv lasix today echo still pending.   Consulted pulmonologist today due to increasing o2 requirements, cxr showing mild improvement. Continue atb for now.   Continue atb for now, might need bronchoscopy.   S/p Fiberoptic bronchoscopy with wash on 1/28. Continue steroids as per pulm.  Continue steroids and nebs treatment, pulm consulted.         Hypertension- (present on admission)   Assessment & Plan    Patient's blood pressure management will be optimized we will keep her systolic blood pressure under 140 diastolic under 90. Patient resumes on Coreg 25 mg twice daily and Hydrocort thiazide 25 mg daily as well as lisinopril 5 mg daily.  Had low bp this morning better after iv bolus 500cc. Likely orthostatic   Will hold her bp med for now.   Keep holding bp meds for now.   Will restart her coreg today at lower dose.         Acute respiratory failure with hypoxia (CMS-HCC)- (present on admission)   Assessment & Plan    Multifactorial due to pneumonia, pulmonary edema, ARDS?  On 7-8 L of o2, pulmonologist consulted.   Will probably need bronchoscopy.   S/p Fiberoptic bronchoscopy with wash, still on 5L of o2, stable. On solu-medrol bid per pulm  Stable on 5L continue o2 per protocol cx neg.         Normochromic normocytic anemia- (present on admission)   Assessment & Plan    Monitor H&H if she drops 7 or 21 or becomes unstable transfuse.  Hb stable.         Hyperlipidemia with target LDL less than 100- (present on admission)   Assessment & Plan    Continued low-fat low-cholesterol diet, monitor fasting lipid panel periodically.  F/u as outpatient.          Anxiety and depression- (present on admission)   Assessment & Plan    Patient is on Prozac for her depression, she is also on Elavil at nighttime.  Stable.        Fibromyalgia- (present on  admission)   Assessment & Plan    Continue with pain management for her fibromyalgia. Patient remains on Lyrica.        Hypothyroid- (present on admission)   Assessment & Plan    -supportive measures with synthroid supplementation at 25 mcg per day, no change  -latest TSH is 0.730 and this is with in establish normal guidlines   Stable.             Reviewed items::  Labs reviewed and Medications reviewed  DVT prophylaxis - mechanical:  SCDs  Antibiotics:  Treating active infection/contamination beyond 24 hours perioperative coverage

## 2018-01-30 LAB
ANION GAP SERPL CALC-SCNC: 10 MMOL/L (ref 0–11.9)
BACTERIA SPEC RESP CULT: NORMAL
BACTERIA SPEC RESP CULT: NORMAL
BASOPHILS # BLD AUTO: 0.1 % (ref 0–1.8)
BASOPHILS # BLD: 0.01 K/UL (ref 0–0.12)
BUN SERPL-MCNC: 31 MG/DL (ref 8–22)
CALCIUM SERPL-MCNC: 8.8 MG/DL (ref 8.5–10.5)
CHLORIDE SERPL-SCNC: 100 MMOL/L (ref 96–112)
CO2 SERPL-SCNC: 25 MMOL/L (ref 20–33)
COMMENT 1642: NORMAL
CREAT SERPL-MCNC: 0.92 MG/DL (ref 0.5–1.4)
EOSINOPHIL # BLD AUTO: 0.01 K/UL (ref 0–0.51)
EOSINOPHIL NFR BLD: 0.1 % (ref 0–6.9)
ERYTHROCYTE [DISTWIDTH] IN BLOOD BY AUTOMATED COUNT: 41.1 FL (ref 35.9–50)
GLUCOSE SERPL-MCNC: 113 MG/DL (ref 65–99)
GRAM STN SPEC: NORMAL
GRAM STN SPEC: NORMAL
HCT VFR BLD AUTO: 34.5 % (ref 37–47)
HGB BLD-MCNC: 11.2 G/DL (ref 12–16)
IMM GRANULOCYTES # BLD AUTO: 0.09 K/UL (ref 0–0.11)
IMM GRANULOCYTES NFR BLD AUTO: 0.7 % (ref 0–0.9)
L PNEUMO IGG TITR SER IF: NORMAL {TITER}
LYMPHOCYTES # BLD AUTO: 1.69 K/UL (ref 1–4.8)
LYMPHOCYTES NFR BLD: 13.5 % (ref 22–41)
MCH RBC QN AUTO: 29.8 PG (ref 27–33)
MCHC RBC AUTO-ENTMCNC: 32.5 G/DL (ref 33.6–35)
MCV RBC AUTO: 91.8 FL (ref 81.4–97.8)
MONOCYTES # BLD AUTO: 0.93 K/UL (ref 0–0.85)
MONOCYTES NFR BLD AUTO: 7.4 % (ref 0–13.4)
MORPHOLOGY BLD-IMP: NORMAL
NEUTROPHILS # BLD AUTO: 9.82 K/UL (ref 2–7.15)
NEUTROPHILS NFR BLD: 78.2 % (ref 44–72)
NRBC # BLD AUTO: 0 K/UL
NRBC BLD-RTO: 0 /100 WBC
PLATELET # BLD AUTO: 382 K/UL (ref 164–446)
PMV BLD AUTO: 8.8 FL (ref 9–12.9)
POTASSIUM SERPL-SCNC: 4.4 MMOL/L (ref 3.6–5.5)
RBC # BLD AUTO: 3.76 M/UL (ref 4.2–5.4)
SIGNIFICANT IND 70042: NORMAL
SITE SITE: NORMAL
SODIUM SERPL-SCNC: 135 MMOL/L (ref 135–145)
SOURCE SOURCE: NORMAL
WBC # BLD AUTO: 12.6 K/UL (ref 4.8–10.8)

## 2018-01-30 PROCEDURE — 85025 COMPLETE CBC W/AUTO DIFF WBC: CPT

## 2018-01-30 PROCEDURE — 80048 BASIC METABOLIC PNL TOTAL CA: CPT

## 2018-01-30 PROCEDURE — 770020 HCHG ROOM/CARE - TELE (206)

## 2018-01-30 PROCEDURE — A9270 NON-COVERED ITEM OR SERVICE: HCPCS | Performed by: HOSPITALIST

## 2018-01-30 PROCEDURE — 700111 HCHG RX REV CODE 636 W/ 250 OVERRIDE (IP): Performed by: HOSPITALIST

## 2018-01-30 PROCEDURE — 97530 THERAPEUTIC ACTIVITIES: CPT

## 2018-01-30 PROCEDURE — 36415 COLL VENOUS BLD VENIPUNCTURE: CPT

## 2018-01-30 PROCEDURE — 97535 SELF CARE MNGMENT TRAINING: CPT

## 2018-01-30 PROCEDURE — 700102 HCHG RX REV CODE 250 W/ 637 OVERRIDE(OP): Performed by: HOSPITALIST

## 2018-01-30 PROCEDURE — 700111 HCHG RX REV CODE 636 W/ 250 OVERRIDE (IP): Performed by: INTERNAL MEDICINE

## 2018-01-30 PROCEDURE — 99233 SBSQ HOSP IP/OBS HIGH 50: CPT | Performed by: INTERNAL MEDICINE

## 2018-01-30 RX ORDER — CARVEDILOL 3.12 MG/1
3.12 TABLET ORAL DAILY
Status: DISCONTINUED | OUTPATIENT
Start: 2018-01-31 | End: 2018-02-01

## 2018-01-30 RX ADMIN — OXYCODONE HYDROCHLORIDE AND ACETAMINOPHEN 1 TABLET: 5; 325 TABLET ORAL at 20:28

## 2018-01-30 RX ADMIN — OXYCODONE HYDROCHLORIDE AND ACETAMINOPHEN 1 TABLET: 5; 325 TABLET ORAL at 09:51

## 2018-01-30 RX ADMIN — ACETAMINOPHEN 650 MG: 325 TABLET, FILM COATED ORAL at 20:28

## 2018-01-30 RX ADMIN — BENZOCAINE AND MENTHOL 1 LOZENGE: 15; 3.6 LOZENGE ORAL at 09:54

## 2018-01-30 RX ADMIN — GUAIFENESIN 200 MG: 100 SOLUTION ORAL at 11:07

## 2018-01-30 RX ADMIN — HEPARIN SODIUM 5000 UNITS: 5000 INJECTION, SOLUTION INTRAVENOUS; SUBCUTANEOUS at 15:15

## 2018-01-30 RX ADMIN — GUAIFENESIN 200 MG: 100 SOLUTION ORAL at 05:26

## 2018-01-30 RX ADMIN — GUAIFENESIN 200 MG: 100 SOLUTION ORAL at 01:13

## 2018-01-30 RX ADMIN — ACETAMINOPHEN 650 MG: 325 TABLET, FILM COATED ORAL at 09:51

## 2018-01-30 RX ADMIN — AMITRIPTYLINE HYDROCHLORIDE 100 MG: 50 TABLET, FILM COATED ORAL at 20:28

## 2018-01-30 RX ADMIN — FLUOXETINE HYDROCHLORIDE 20 MG: 20 CAPSULE ORAL at 08:00

## 2018-01-30 RX ADMIN — POTASSIUM CHLORIDE 20 MEQ: 1500 TABLET, EXTENDED RELEASE ORAL at 20:29

## 2018-01-30 RX ADMIN — LEVOTHYROXINE SODIUM 25 MCG: 25 TABLET ORAL at 05:26

## 2018-01-30 RX ADMIN — BISACODYL 10 MG: 10 SUPPOSITORY RECTAL at 17:38

## 2018-01-30 RX ADMIN — HEPARIN SODIUM 5000 UNITS: 5000 INJECTION, SOLUTION INTRAVENOUS; SUBCUTANEOUS at 05:26

## 2018-01-30 RX ADMIN — OXYCODONE HYDROCHLORIDE AND ACETAMINOPHEN 1 TABLET: 5; 325 TABLET ORAL at 13:57

## 2018-01-30 RX ADMIN — MAGNESIUM HYDROXIDE 30 ML: 400 SUSPENSION ORAL at 14:00

## 2018-01-30 RX ADMIN — CARVEDILOL 12.5 MG: 12.5 TABLET, FILM COATED ORAL at 08:01

## 2018-01-30 RX ADMIN — PREGABALIN 150 MG: 150 CAPSULE ORAL at 20:28

## 2018-01-30 RX ADMIN — POTASSIUM CHLORIDE 20 MEQ: 1500 TABLET, EXTENDED RELEASE ORAL at 08:01

## 2018-01-30 RX ADMIN — HEPARIN SODIUM 5000 UNITS: 5000 INJECTION, SOLUTION INTRAVENOUS; SUBCUTANEOUS at 20:29

## 2018-01-30 RX ADMIN — FUROSEMIDE 20 MG: 10 INJECTION, SOLUTION INTRAMUSCULAR; INTRAVENOUS at 17:37

## 2018-01-30 RX ADMIN — STANDARDIZED SENNA CONCENTRATE AND DOCUSATE SODIUM 2 TABLET: 8.6; 5 TABLET, FILM COATED ORAL at 20:28

## 2018-01-30 RX ADMIN — GUAIFENESIN 200 MG: 100 SOLUTION ORAL at 17:37

## 2018-01-30 RX ADMIN — FUROSEMIDE 20 MG: 10 INJECTION, SOLUTION INTRAMUSCULAR; INTRAVENOUS at 05:26

## 2018-01-30 RX ADMIN — PREDNISONE 40 MG: 20 TABLET ORAL at 08:00

## 2018-01-30 RX ADMIN — STANDARDIZED SENNA CONCENTRATE AND DOCUSATE SODIUM 2 TABLET: 8.6; 5 TABLET, FILM COATED ORAL at 08:01

## 2018-01-30 ASSESSMENT — COGNITIVE AND FUNCTIONAL STATUS - GENERAL
MOBILITY SCORE: 23
CLIMB 3 TO 5 STEPS WITH RAILING: A LITTLE
HELP NEEDED FOR BATHING: A LITTLE
SUGGESTED CMS G CODE MODIFIER DAILY ACTIVITY: CJ
DAILY ACTIVITIY SCORE: 20
TOILETING: A LITTLE
PERSONAL GROOMING: A LITTLE
SUGGESTED CMS G CODE MODIFIER MOBILITY: CI
DRESSING REGULAR LOWER BODY CLOTHING: A LITTLE

## 2018-01-30 ASSESSMENT — ENCOUNTER SYMPTOMS
ORTHOPNEA: 0
MYALGIAS: 0
SPUTUM PRODUCTION: 0
COUGH: 0
VOMITING: 0
DOUBLE VISION: 0
CHILLS: 0
ABDOMINAL PAIN: 0
NAUSEA: 0
FEVER: 0
HEADACHES: 0
DIZZINESS: 0
SHORTNESS OF BREATH: 1
HEMOPTYSIS: 0
DEPRESSION: 0
PALPITATIONS: 0
BRUISES/BLEEDS EASILY: 0

## 2018-01-30 ASSESSMENT — PAIN SCALES - GENERAL
PAINLEVEL_OUTOF10: 2
PAINLEVEL_OUTOF10: 8
PAINLEVEL_OUTOF10: 3
PAINLEVEL_OUTOF10: 8
PAINLEVEL_OUTOF10: 2
PAINLEVEL_OUTOF10: 3
PAINLEVEL_OUTOF10: 0
PAINLEVEL_OUTOF10: 3
PAINLEVEL_OUTOF10: 8
PAINLEVEL_OUTOF10: 0
PAINLEVEL_OUTOF10: 8

## 2018-01-30 ASSESSMENT — GAIT ASSESSMENTS
DISTANCE (FEET): 8
DEVIATION: DECREASED BASE OF SUPPORT
GAIT LEVEL OF ASSIST: CONTACT GUARD ASSIST

## 2018-01-30 NOTE — PROGRESS NOTES
[facility] Progress Note    Date of Service: 2018    Chief Complaint  77 y.o. female admitted 2018 with pneumonia and respiratory failure.    Interval Problem Update  Resp failure-no clear diagnosis at this time. She claims she feels better overall, but o2 requirements were increased to 6 L NC last night. Mild non productive cough, remains afebrile. NO obvious toxin exposure. Tolerating steroids ok. BAL negative thus far.    Consultants/Specialty  Pulm.     Disposition  Tbd.         Review of Systems   Constitutional: Negative for chills and fever.   HENT: Negative for hearing loss.    Eyes: Negative for double vision.   Respiratory: Positive for shortness of breath. Negative for cough, hemoptysis and sputum production.         Essentially unchanged today   Cardiovascular: Negative for palpitations and orthopnea.   Gastrointestinal: Negative for abdominal pain, nausea and vomiting.   Genitourinary: Negative for dysuria.   Musculoskeletal: Negative for myalgias.   Neurological: Negative for dizziness and headaches.   Endo/Heme/Allergies: Does not bruise/bleed easily.   Psychiatric/Behavioral: Negative for depression.   All other systems reviewed and are negative.     Physical Exam  Laboratory/Imaging   Hemodynamics  Temp (24hrs), Av.4 °C (97.6 °F), Min:36.1 °C (97 °F), Max:36.8 °C (98.3 °F)   Temperature: 36.8 °C (98.3 °F)  Pulse  Av.1  Min: 61  Max: 100    Blood Pressure : (!) 88/54 (RN notfied)      Respiratory      Respiration: 16, Pulse Oximetry: 95 %     Work Of Breathing / Effort: Mild  RUL Breath Sounds: Clear, RML Breath Sounds: Diminished;Crackles, RLL Breath Sounds: Diminished;Crackles, ROSSY Breath Sounds: Clear, LLL Breath Sounds: Diminished;Crackles    Fluids    Intake/Output Summary (Last 24 hours) at 18 1238  Last data filed at 18 1000   Gross per 24 hour   Intake              600 ml   Output             1850 ml   Net            -1250 ml       Nutrition  Orders  Placed This Encounter   Procedures   • DIET ORDER     Standing Status:   Standing     Number of Occurrences:   1     Order Specific Question:   Diet:     Answer:   Regular [1]     Physical Exam   Constitutional: She is oriented to person, place, and time. No distress.   Eyes: Right eye exhibits no discharge. Left eye exhibits no discharge.   Cardiovascular: Normal rate, regular rhythm and normal heart sounds.    Pulmonary/Chest: Effort normal. No stridor. No respiratory distress. She has no wheezes. She has rales (velcro more prominent at the bases, B/L).   No use of accessory resp muscles at this time   Abdominal: Soft. Bowel sounds are normal. She exhibits no distension. There is no tenderness.   Musculoskeletal: Normal range of motion. She exhibits no edema.   Neurological: She is oriented to person, place, and time. She exhibits normal muscle tone.   Skin: No erythema.   Psychiatric: She has a normal mood and affect.   Nursing note and vitals reviewed.      Recent Labs      01/30/18   0153   WBC  12.6*   RBC  3.76*   HEMOGLOBIN  11.2*   HEMATOCRIT  34.5*   MCV  91.8   MCH  29.8   MCHC  32.5*   RDW  41.1   PLATELETCT  382   MPV  8.8*     Recent Labs      01/28/18   0212  01/30/18   0153   SODIUM  134*  135   POTASSIUM  4.7  4.4   CHLORIDE  98  100   CO2  24  25   GLUCOSE  161*  113*   BUN  25*  31*   CREATININE  0.94  0.92   CALCIUM  9.4  8.8     Recent Labs      01/28/18   0212   INR  1.18*                  Assessment/Plan     Hypokalemia- (present on admission)   Assessment & Plan    -increase K supplementation today, monitor while on diuretics        CAP (community acquired pneumonia)- (present on admission)   Assessment & Plan    -stopped levaquin yesterday, concerning for ILD/IPF?  -see above  -try to wean o2  -RT protocol, monitor volume status closely        Hypertension- (present on admission)   Assessment & Plan    -blood pressure is still on the low side  -reduce coreg again, low dose diuretics for lung  issues  -continue to hold other outpatient BP meds at this time  -adjust PRN        Acute respiratory failure with hypoxia (CMS-HCC)- (present on admission)   Assessment & Plan    Multifactorial due to pneumonia, pulmonary edema, possible ILD  -FLACA is negative  -s/p bronch, BAL's negative thus far, pathology of the fluid is pending at this time  -continue empiric steroids, likely will need prolonged course  -will need home o2  -unclear if she will need wedge bx or not?  -ECHO is ok        Normochromic normocytic anemia- (present on admission)   Assessment & Plan    Monitor H&H if she drops 7 or 21 or becomes unstable transfuse.  Hb stable.         Hyperlipidemia with target LDL less than 100- (present on admission)   Assessment & Plan    Continued low-fat low-cholesterol diet, monitor fasting lipid panel periodically.  F/u as outpatient.          Anxiety and depression- (present on admission)   Assessment & Plan    Patient is on Prozac for her depression, she is also on Elavil at nighttime.  Stable.        Fibromyalgia- (present on admission)   Assessment & Plan    Continue with pain management for her fibromyalgia. Patient remains on Lyrica.        Hypothyroid- (present on admission)   Assessment & Plan    -supportive measures with synthroid supplementation at 25 mcg per day, no change  -latest TSH is 0.730 and this is with in establish normal guidlines   Stable.             Reviewed items::  Labs reviewed and Medications reviewed  DVT prophylaxis - mechanical:  SCDs  Antibiotics:  Treating active infection/contamination beyond 24 hours perioperative coverage

## 2018-01-30 NOTE — DIETARY
Nutrition Services: Brief Update    RD following pt for PO adequacy. Per chart review of ADL's, the pt has been consistently consuming >50% of meals since 1/26. The pt does not present with any further acute nutrition concerns at this time.     Consult RD as needed, RD available PRN and to rescreen weekly.

## 2018-01-30 NOTE — PROGRESS NOTES
Pt medicated for pain about 30 mins ago, will reasses in 30 mins. Pt sitting up in bed at this time watching TV. No other complaints at this time. Safety and fall precautions in place, call light in reach will continue to monitor

## 2018-01-30 NOTE — THERAPY
"Occupational Therapy Treatment completed with focus on ADLs, ADL transfers and patient education.  Functional Status:  Pt seen for OT tx today, improved level of alertness compare to last session, pt I with ADLs and functional mobility limited due to decreased aerobic capacity, spO2 with static standing and functional t/f's drops to 82% on supplemental O2 by oxymask, returns back to 90% with seated restbreak within 10-15sec and proper deep breathing techniques. Pt was able to perform seated ADLs with supervision and stable O2 saturation. Pt would continue to benefit from acute skilled services to maximize pt's endurance, aerobic response to activity, and strengthening.   Plan of Care: Will benefit from Occupational Therapy 3 times per week  Discharge Recommendations:  Equipment Will Continue to Assess for Equipment Needs. Post-acute therapy Discharge to a transitional care facility for continued skilled therapy services. and Discharge to home with outpatient or home health for additional skilled therapy services pending pt's progress with therapy and medical POC.     See \"Rehab Therapy-Acute\" Patient Summary Report for complete documentation.     "

## 2018-01-30 NOTE — CARE PLAN
Problem: Nutritional:  Goal: Achieve adequate nutritional intake  Patient will consume 50% of meals   Outcome: MET Date Met: 01/30/18

## 2018-01-30 NOTE — PROGRESS NOTES
Pt ambulated to restroom, tolerating well. Assisted back to bed, safety and fall precautions in place, will continue to monitor.

## 2018-01-30 NOTE — PROGRESS NOTES
Patient resting comfortably at this time. No needs voiced, call light within reach. Will continue to monitor.

## 2018-01-30 NOTE — PROGRESS NOTES
Pulmonary Critical Care Progress Note        Chief Complaint:  Exertional dyspnea accompanied by dry cough and fatigue    History of Present Illness: 77 y.o. female admitted for worsening dyspnea for 2 weeks but increasing symptoms including fatigue for at least 6 months.  Also diffuse chest discomfort and non productive cough. No sx of CVD or known malignancy.  No h/o contact with birds or HIV risks.  No significant weight loss or change in bowel habits.      ROS:  Respiratory: Dyspnea at rest aggravated upon exertion, Cardiac: No palpitations anginal chest pain, dependent edema,, GI: Admits retrosternal burning and symptoms of reflux. No nausea or vomiting or abdominal pain. Musculoskeletal : Severe backache. All other systems negative.    Interval Events:  Status post bronchoscopy with bronchial washing done yesterday. BAL was deferred considering patients clinical condition.  24 hour interval history reviewed    01/29/18: S/p bronchoscopy with bronchial washing yesterday. Afebrile. Has non productive cough and dyspnea. No hemoptysis. Patient was being seen by Dr Winters and I have taken over the patient from today.    1/30/18: Oral steroids were started yesterday. Patient admits improvement in breathing. Still develops dyspnea upon minor exertion and requires oxygen supplementation. Afebrile.    PFSH:  No change.    Respiratory:      Pulse oximetery is 97% on O2 6l/min    Patient still c/o non specific sarah upper chest pain on inspiration       Exam: Bilateral end-inspiratory crepitations more in bases. No wheeze  Imaging: CXR dated 02/28/18 reviewed shows perihilar prominence bilaterally with diffuse patchy opacity. No consolidation or effusion.     CT reviewed  1.  Hazy and groundglass bilateral opacities suggests underlying edema and/or infiltrates.  2.  Enlarged mediastinal lymph nodes, consider causes of adenopathy with additional workup as clinically appropriate.  3.  Atherosclerosis  4.  Cholelithiasis  5.   Small hiatal hernia  6.  Diverticulosis  7.  Bilateral pulmonary nodules measuring up to 10.5 mm. See nodule follow-up recommendations below    HemoDynamics:  Pulse: 72  Blood Pressure : 100/60       Exam: First and second heart sounds audible. NO murmur or pericardial rub     ECHO-  Normal left ventricular chamber size.  Left ventricular ejection fraction is visually estimated to be 60%.  Moderately dilated right ventricle.  Normal right ventricular systolic function.  Moderate tricuspid regurgitation.   Right ventricular systolic pressure is estimated to be 65-70 mmHg        Neuro:  GCS  15    Exam: Tendon jerks intact. No focal neurological deficit.       Fluids:  Intake/Output       01/28/18 0700 - 01/29/18 0659 01/29/18 0700 - 01/30/18 0659 01/30/18 0700 - 01/31/18 0659      [id number] 3984-8533 Total 4315-4699 1039-5103 Total 5902-8068 4044-1411 Total       Intake    P.O.  --  240 240  300  -- 300  200  -- 200    P.O. -- 240 240 300 -- 300 200 -- 200    Total Intake -- 240 240 300 -- 300 200 -- 200       Output    Urine  200  450 650  600  950 1550  --  -- --    Number of Times Voided -- 1 x 1 x 1 x -- 1 x -- -- --    Void (ml) 200 450 650  -- -- --    Total Output 200 450 650  -- -- --       Net I/O     -200 -210 -410 -300 -950 -1250 200 -- 200        Weight: 63.8 kg (140 lb 10.5 oz)  Recent Labs      01/28/18 0212 01/30/18   0153   SODIUM  134*  135   POTASSIUM  4.7  4.4   CHLORIDE  98  100   CO2  24  25   BUN  25*  31*   CREATININE  0.94  0.92   MAGNESIUM  1.6   --    PHOSPHORUS  4.2   --    CALCIUM  9.4  8.8       GI/Nutrition:  Exam: abdomen is soft and non-tender. Bowel sounds audible  Oral feeding   Liver Function  Recent Labs      01/28/18 0212 01/30/18   0153   GLUCOSE  161*  113*       Heme:  Recent Labs      01/27/18   1107  01/28/18 0212 01/30/18   0153   RBC  3.85*   --   3.76*   HEMOGLOBIN  11.3*   --   11.2*   HEMATOCRIT  35.3*   --   34.5*   PLATELETCT  372   --    382   PROTHROMBTM   --   14.7*   --    APTT  37.7*   --    --    INR   --   1.18*   --        Infectious Disease:  Temp  Av.4 °C (97.6 °F)  Min: 36.1 °C (97 °F)  Max: 36.9 °C (98.4 °F)  Micro:    BC no growth in 5 days.  Bronchial washings negative for AFB or fungal elements.  HIV antibodies negative  FLACA: negative    Recent Labs      18   1107  18   0153   WBC  8.7  12.6*   NEUTSPOLYS  88.20*  78.20*   LYMPHOCYTES  9.80*  13.50*   MONOCYTES  1.00  7.40   EOSINOPHILS  0.00  0.10   BASOPHILS  0.10  0.10     Current Facility-Administered Medications   Medication Dose Frequency Provider Last Rate Last Dose   • predniSONE (DELTASONE) tablet 40 mg  40 mg DAILY Muhammad K Gondal, M.D.   40 mg at 18 0800   • carvedilol (COREG) tablet 12.5 mg  12.5 mg DAILY Juan Myles M.D.   12.5 mg at 18 0801   • amitriptyline (ELAVIL) tablet 100 mg  100 mg QHS Juan Myles M.D.   100 mg at 18   • heparin injection 5,000 Units  5,000 Units Q8HRS Juan Myles M.D.   5,000 Units at 18 0526   • benzocaine-menthol (CEPACOL) lozenge 1 Lozenge  1 Lozenge Q2HRS PRN Juan Myles M.D.   1 Lozenge at 18 1711   • ipratropium-albuterol (DUONEB) nebulizer solution 3 mL  3 mL Q4H PRN (RT) Juan Myles M.D.       • oxycodone-acetaminophen (PERCOCET) 5-325 MG per tablet 1 Tab  1 Tab Q4HRS PRN Juan Myles M.D.   1 Tab at 18   • furosemide (LASIX) injection 20 mg  20 mg BID DIURETIC Juan Myles M.D.   20 mg at 18   • guaiFENesin (ROBITUSSIN) 100 MG/5ML solution 200 mg  10 mL Q6HRS Juan Myles M.D.   200 mg at 18   • fluoxetine (PROZAC) capsule 20 mg  20 mg DAILY Guicho Alan M.D.   20 mg at 18 0800   • levothyroxine (SYNTHROID) tablet 25 mcg  25 mcg AM ES Guicho Alan M.D.   25 mcg at 18   • pregabalin (LYRICA) capsule 150 mg  150 mg Q EVENING Guicho Alan M.D.   150 mg at  01/29/18 2013   • senna-docusate (PERICOLACE or SENOKOT S) 8.6-50 MG per tablet 2 Tab  2 Tab BID Guicho Alan M.D.   2 Tab at 01/30/18 0801    And   • polyethylene glycol/lytes (MIRALAX) PACKET 1 Packet  1 Packet QDAY PRN Guicho Alan M.D.        And   • magnesium hydroxide (MILK OF MAGNESIA) suspension 30 mL  30 mL QDAY PRN Guicho Alan M.D.        And   • bisacodyl (DULCOLAX) suppository 10 mg  10 mg QDAY PRN Guicho Alan M.D.       • Respiratory Care per Protocol   Continuous RT Guicho Alan M.D.       • acetaminophen (TYLENOL) tablet 650 mg  650 mg Q6HRS PRN Guicho Alan M.D.   650 mg at 01/29/18 1630   • labetalol (NORMODYNE,TRANDATE) injection 10 mg  10 mg Q4HRS PRN Guicho Alan M.D.       • ondansetron (ZOFRAN) syringe/vial injection 4 mg  4 mg Q4HRS PRN Guicho Alan M.D.   4 mg at 01/28/18 2228   • ondansetron (ZOFRAN ODT) dispertab 4 mg  4 mg Q4HRS PRN Guicho Alan M.D.       • potassium chloride SA (Kdur) tablet 20 mEq  20 mEq BID Guicho Alan M.D.   20 mEq at 01/30/18 0801     Last reviewed on 1/22/2018  7:21 PM by Arely Silva [facility]    Quality  Measures:  Labs reviewed, Medications reviewed and Radiology images reviewed  Adam catheter: No Adam      DVT Prophylaxis: Heparin            Problems:  Acute Hypoxemic respiratory failure improved with O2 supplementation  Interstitial pneumonitis/ ILD  Pulmonary edema vs hypersensitivity pneumonitis is also in differential diagnosis ( Diffuse ground glass opacities on CT chest)  Moderately severe pulmonary HTN  Hx HTN, hypothyroidism  Anemia  Depression  GERD    Plan:  Will follow bronchial washing culture results  VATS/Wedge biopsy of Lung indicated however as patient is a DNR status and hypoxic will defer invasive procedure Patient agrees with the management plan  Oral steroids empirically.  Levofloxacin discontinued  Continue Oxygen supplementation and taper to maintain SpO2 above 94%  PFTs recommended as outpatient.

## 2018-01-30 NOTE — DISCHARGE PLANNING
Medical Social Work    Per chart review, pt on 7L O2. Pt does not have diagnosis to qualify for home O2. SW following for PT/OT needs.

## 2018-01-30 NOTE — CARE PLAN
Problem: Safety  Goal: Will remain free from injury    Intervention: Educate patient and significant other/support system about adaptive mobility strategies and safe transfers  Educated patient to use call light if in need of assistance. Patient verbalizes understanding. Call light within reach.

## 2018-01-30 NOTE — PROGRESS NOTES
Patient Assessment complete. See charting, patient complaining of some mild pain, see MAR. No other patient complaints at this time. Patient resting comfortably. Will continue to monitor.

## 2018-01-30 NOTE — PROGRESS NOTES
Bedside shift report received, pt awake sitting up in bed. No complaints voiced at this time. Safety and fall precautions in place, call light in reach, will continue to monitor.

## 2018-01-31 ENCOUNTER — HOME HEALTH ADMISSION (OUTPATIENT)
Dept: HOME HEALTH SERVICES | Facility: HOME HEALTHCARE | Age: 78
End: 2018-01-31
Payer: COMMERCIAL

## 2018-01-31 LAB
M PNEUMO IGG SER IA-ACNC: 0.09 U/L
M PNEUMO IGM SER IA-ACNC: 0.05 U/L

## 2018-01-31 PROCEDURE — 770020 HCHG ROOM/CARE - TELE (206)

## 2018-01-31 PROCEDURE — A9270 NON-COVERED ITEM OR SERVICE: HCPCS | Performed by: STUDENT IN AN ORGANIZED HEALTH CARE EDUCATION/TRAINING PROGRAM

## 2018-01-31 PROCEDURE — 99232 SBSQ HOSP IP/OBS MODERATE 35: CPT | Performed by: INTERNAL MEDICINE

## 2018-01-31 PROCEDURE — 700111 HCHG RX REV CODE 636 W/ 250 OVERRIDE (IP): Performed by: HOSPITALIST

## 2018-01-31 PROCEDURE — A9270 NON-COVERED ITEM OR SERVICE: HCPCS | Performed by: HOSPITALIST

## 2018-01-31 PROCEDURE — 700102 HCHG RX REV CODE 250 W/ 637 OVERRIDE(OP): Performed by: HOSPITALIST

## 2018-01-31 PROCEDURE — 700102 HCHG RX REV CODE 250 W/ 637 OVERRIDE(OP): Performed by: STUDENT IN AN ORGANIZED HEALTH CARE EDUCATION/TRAINING PROGRAM

## 2018-01-31 PROCEDURE — 700111 HCHG RX REV CODE 636 W/ 250 OVERRIDE (IP): Performed by: INTERNAL MEDICINE

## 2018-01-31 RX ORDER — FAMOTIDINE 20 MG/1
20 TABLET, FILM COATED ORAL 2 TIMES DAILY
Status: DISCONTINUED | OUTPATIENT
Start: 2018-01-31 | End: 2018-02-03 | Stop reason: HOSPADM

## 2018-01-31 RX ADMIN — GUAIFENESIN 200 MG: 100 SOLUTION ORAL at 18:03

## 2018-01-31 RX ADMIN — HEPARIN SODIUM 5000 UNITS: 5000 INJECTION, SOLUTION INTRAVENOUS; SUBCUTANEOUS at 05:52

## 2018-01-31 RX ADMIN — HEPARIN SODIUM 5000 UNITS: 5000 INJECTION, SOLUTION INTRAVENOUS; SUBCUTANEOUS at 20:44

## 2018-01-31 RX ADMIN — STANDARDIZED SENNA CONCENTRATE AND DOCUSATE SODIUM 2 TABLET: 8.6; 5 TABLET, FILM COATED ORAL at 20:43

## 2018-01-31 RX ADMIN — POTASSIUM CHLORIDE 20 MEQ: 1500 TABLET, EXTENDED RELEASE ORAL at 20:43

## 2018-01-31 RX ADMIN — FLUOXETINE HYDROCHLORIDE 20 MG: 20 CAPSULE ORAL at 08:02

## 2018-01-31 RX ADMIN — GUAIFENESIN 200 MG: 100 SOLUTION ORAL at 13:17

## 2018-01-31 RX ADMIN — ACETAMINOPHEN 650 MG: 325 TABLET, FILM COATED ORAL at 14:35

## 2018-01-31 RX ADMIN — OXYCODONE HYDROCHLORIDE AND ACETAMINOPHEN 1 TABLET: 5; 325 TABLET ORAL at 14:35

## 2018-01-31 RX ADMIN — LEVOTHYROXINE SODIUM 25 MCG: 25 TABLET ORAL at 05:52

## 2018-01-31 RX ADMIN — ACETAMINOPHEN 650 MG: 325 TABLET, FILM COATED ORAL at 20:43

## 2018-01-31 RX ADMIN — POTASSIUM CHLORIDE 20 MEQ: 1500 TABLET, EXTENDED RELEASE ORAL at 08:02

## 2018-01-31 RX ADMIN — STANDARDIZED SENNA CONCENTRATE AND DOCUSATE SODIUM 2 TABLET: 8.6; 5 TABLET, FILM COATED ORAL at 08:02

## 2018-01-31 RX ADMIN — PREDNISONE 40 MG: 20 TABLET ORAL at 08:02

## 2018-01-31 RX ADMIN — OXYCODONE HYDROCHLORIDE AND ACETAMINOPHEN 1 TABLET: 5; 325 TABLET ORAL at 20:43

## 2018-01-31 RX ADMIN — AMITRIPTYLINE HYDROCHLORIDE 100 MG: 50 TABLET, FILM COATED ORAL at 20:43

## 2018-01-31 RX ADMIN — PREGABALIN 150 MG: 150 CAPSULE ORAL at 20:43

## 2018-01-31 RX ADMIN — FUROSEMIDE 20 MG: 10 INJECTION, SOLUTION INTRAMUSCULAR; INTRAVENOUS at 18:04

## 2018-01-31 RX ADMIN — ACETAMINOPHEN 650 MG: 325 TABLET, FILM COATED ORAL at 08:02

## 2018-01-31 RX ADMIN — FUROSEMIDE 20 MG: 10 INJECTION, SOLUTION INTRAMUSCULAR; INTRAVENOUS at 05:52

## 2018-01-31 RX ADMIN — GUAIFENESIN 200 MG: 100 SOLUTION ORAL at 00:00

## 2018-01-31 RX ADMIN — OXYCODONE HYDROCHLORIDE AND ACETAMINOPHEN 1 TABLET: 5; 325 TABLET ORAL at 08:01

## 2018-01-31 RX ADMIN — FAMOTIDINE 20 MG: 20 TABLET, FILM COATED ORAL at 20:43

## 2018-01-31 RX ADMIN — GUAIFENESIN 200 MG: 100 SOLUTION ORAL at 05:52

## 2018-01-31 ASSESSMENT — ENCOUNTER SYMPTOMS
BLURRED VISION: 0
VOMITING: 0
SHORTNESS OF BREATH: 1
DIZZINESS: 0
HEMOPTYSIS: 0
NAUSEA: 0
DEPRESSION: 0
BRUISES/BLEEDS EASILY: 0
COUGH: 0
MYALGIAS: 0
SPUTUM PRODUCTION: 0
FEVER: 0
CHILLS: 0
NECK PAIN: 0

## 2018-01-31 ASSESSMENT — PAIN SCALES - GENERAL
PAINLEVEL_OUTOF10: 1
PAINLEVEL_OUTOF10: 7
PAINLEVEL_OUTOF10: 5
PAINLEVEL_OUTOF10: 8
PAINLEVEL_OUTOF10: 2
PAINLEVEL_OUTOF10: 4

## 2018-01-31 NOTE — PROGRESS NOTES
Bedside shift report received, pt sleeping at this time. Safety and fall precautions in place, call light in reach will conitnue to monitor

## 2018-01-31 NOTE — PROGRESS NOTES
CNA was assisting pt out of bed to do o2 eval, cna states pts legs just gave out under her while she was standing and pt fell to floor. Yellow socks and band in place, all other fall precautions were in place, call to md to notify. Vitals after assisted to bed were 107/56 HR 78 o2 93

## 2018-01-31 NOTE — PROGRESS NOTES
Patient is sleeping comfortably in bed, no signs of distress, satting at 95% on 5 L, even unlabored breathing, will continue to monitor.

## 2018-01-31 NOTE — PROGRESS NOTES
Report received at bedside, assumed care. Pt sitting at edge of bed, ambulated to bathroom. A&O x 4. No apparent pain present. No other concerns, complains or distress. Tele box on. Chart reviewed. Bed in lowest position, treaded slipper sock on, and call light within reach.

## 2018-01-31 NOTE — DISCHARGE PLANNING
[facility] received a HH choice form. SW on floor has been notified that a HH order is needed for this patient.

## 2018-01-31 NOTE — PROGRESS NOTES
Pt resting in bed. States pain is much better that this am, doesn't want to do o2 eval at this time, will attempt again after lunch. Safety and fall precautions in place, call light in reach, will continue to monitor

## 2018-01-31 NOTE — DISCHARGE PLANNING
Transitional Care Navigator:    Met with pt at bedside to discuss transitional care services. Home Health level of care is being recommended. HH level of care explained in detail. Pt is agreeable and has chosen [facility]. Choice form completed and faxed to [facility].     Will need HH order.

## 2018-01-31 NOTE — FACE TO FACE
Face to Face Supporting Documentation - Home Health    The encounter with this patient was in whole or in part the primary reason for home health admission.    Date of encounter:   Patient:                    MRN:                       YOB: 2019  Ade Hermosillo  [id number]  1940     Home health to see patient for:  Skilled Nursing care for assessment, interventions & education, Physical Therapy evaluation and treatment and Occupational therapy evaluation and treatment    Skilled need for:  New Onset Medical Diagnosis interstitial lung disease    Skilled nursing interventions to include:  Comment: needs help with therapies    Homebound status evidenced by:  Need the aid of supportive devices such as crutches, canes, wheelchairs or walkers. Leaving home requires a considerable and taxing effort. There is a normal inability to leave the home.    Community Physician to provide follow up care: Paramjit Akbar M.D.     Optional Interventions? No      I certify the face to face encounter for this home health care referral meets the CMS requirements and the encounter/clinical assessment with the patient was, in whole, or in part, for the medical condition(s) listed above, which is the primary reason for home health care. Based on my clinical findings: the service(s) are medically necessary, support the need for home health care, and the homebound criteria are met.  I certify that this patient has had a face to face encounter by myself.  Tate Villarreal M.D. - NPI: [id number]

## 2018-01-31 NOTE — PROGRESS NOTES
Pt assisted to restroom states shes having some gas pain and would like to try to have a BM, has not had one in several days. Milk of magnesia given. Pt unable to void but is passing gas. Will attempt again

## 2018-01-31 NOTE — CARE PLAN
Problem: Safety  Goal: Will remain free from falls    Intervention: Assess risk factors for falls  Educated patient on use of call light, no slip socks on, bed lowest position. All needs attended to. Patient verbalized understanding.         Problem: Venous Thromboembolism (VTW)/Deep Vein Thrombosis (DVT) Prevention:  Goal: Patient will participate in Venous Thrombosis (VTE)/Deep Vein Thrombosis (DVT)Prevention Measures    Intervention: Encourage ambulation/mobilization at level directed by Physical Therapy in collaboration with Interdisciplinary Team  Educated pt about VTE/DVT treatment and risks/benefits. Educated patient about medications and side effects regarding VTE/DVT. Encouraging ambulation. Will continue to assess for complications r/t VTE/DVT use.

## 2018-01-31 NOTE — PROGRESS NOTES
Pulmonary Progress Note        Chief Complaint:  Exertional dyspnea accompanied by dry cough and fatigue    History of Present Illness: 77 y.o. female admitted for worsening dyspnea for 2 weeks but increasing symptoms including fatigue for at least 6 months.  Also diffuse chest discomfort and non productive cough. No sx of CVD or known malignancy.  No h/o contact with birds or HIV risks.  No significant weight loss or change in bowel habits.      ROS:  Respiratory: Dyspnea at rest aggravated upon exertion, Cardiac: No palpitations anginal chest pain, dependent edema,, GI: Admits retrosternal burning and symptoms of reflux. No nausea or vomiting or abdominal pain. Musculoskeletal : Severe backache. All other systems negative.    Interval Events:  Bronchoscopy did not reveal any abnormal findings.  24 hour interval history reviewed    01/29/18: S/p bronchoscopy with bronchial washing yesterday. Afebrile. Has non productive cough and dyspnea. No hemoptysis. Patient was being seen by Dr Winters and I have taken over the patient from today.    1/30/18: Oral steroids were started yesterday. Patient admits improvement in breathing. Still develops dyspnea upon minor exertion and requires oxygen supplementation. Afebrile.    1/31/18: Patient continues to improve. Oxygen requirements now down to 4L and satting at 95%.    PFSH:  No change.    Respiratory:      Pulse oximetery is 97% on O2 6l/min    Patient still c/o non specific sarah upper chest pain on inspiration       Exam: Bilateral end-inspiratory crepitations more in bases. No wheeze  Imaging: CXR dated 02/28/18 reviewed shows perihilar prominence bilaterally with diffuse patchy opacity. No consolidation or effusion.     CT reviewed  1.  Hazy and groundglass bilateral opacities suggests underlying edema and/or infiltrates.  2.  Enlarged mediastinal lymph nodes, consider causes of adenopathy with additional workup as clinically appropriate.  3.  Atherosclerosis  4.   Cholelithiasis  5.  Small hiatal hernia  6.  Diverticulosis  7.  Bilateral pulmonary nodules measuring up to 10.5 mm. See nodule follow-up recommendations below    HemoDynamics:  Pulse: 61  Blood Pressure : (!) 93/61       Exam: First and second heart sounds audible. NO murmur or pericardial rub     ECHO-  Normal left ventricular chamber size.  Left ventricular ejection fraction is visually estimated to be 60%.  Moderately dilated right ventricle.  Normal right ventricular systolic function.  Moderate tricuspid regurgitation.   Right ventricular systolic pressure is estimated to be 65-70 mmHg        Neuro:  GCS  15    Exam: Tendon jerks intact. No focal neurological deficit.       Fluids:  Intake/Output       18 0700 - 18 0659 18 0700 - 18 0659 18 0700 - 18 0659      7911-7877 2644-9510 Total 6269-1765 1445-7642 Total 3354-6527 6794-2060 Total       Intake    P.O.  300  -- 300  600  360 960  --  -- --    P.O. 300 -- 300 600 360 960 -- -- --    Total Intake 300 -- 300 600 360 960 -- -- --       Output    Urine  600  950 1550  700  -- 700  --  -- --    Number of Times Voided 1 x -- 1 x -- 1 x 1 x -- -- --    Void (ml)  700 -- 700 -- -- --    Total Output  700 -- 700 -- -- --       Net I/O     -300 -950 -1250 -100 360 260 -- -- --        Weight: 62.7 kg (138 lb 3.7 oz)  Recent Labs      18   SODIUM  135   POTASSIUM  4.4   CHLORIDE  100   CO2  25   BUN  31*   CREATININE  0.92   CALCIUM  8.8       GI/Nutrition:  Exam: abdomen is soft and non-tender. Bowel sounds audible  Oral feeding   Liver Function  Recent Labs      18   GLUCOSE  113*       Heme:  Recent Labs      18   RBC  3.76*   HEMOGLOBIN  11.2*   HEMATOCRIT  34.5*   PLATELETCT  382       Infectious Disease:  Temp  Av.5 °C (97.7 °F)  Min: 36.2 °C (97.2 °F)  Max: 36.9 °C (98.4 °F)  Micro:    BC no growth in 5 days.  Bronchial washings negative for AFB or fungal  elements.  HIV antibodies negative  FLACA: negative    Recent Labs      01/30/18   0153   WBC  12.6*   NEUTSPOLYS  78.20*   LYMPHOCYTES  13.50*   MONOCYTES  7.40   EOSINOPHILS  0.10   BASOPHILS  0.10     Current Facility-Administered Medications   Medication Dose Frequency Provider Last Rate Last Dose   • carvedilol (COREG) tablet 3.125 mg  3.125 mg DAILY Tate Villarreal M.D.       • predniSONE (DELTASONE) tablet 40 mg  40 mg DAILY Muhammad K Gondal, M.D.   40 mg at 01/31/18 0802   • amitriptyline (ELAVIL) tablet 100 mg  100 mg QHS Juan Myles M.D.   100 mg at 01/30/18 2028   • heparin injection 5,000 Units  5,000 Units Q8HRS Juan Myles M.D.   5,000 Units at 01/31/18 0552   • benzocaine-menthol (CEPACOL) lozenge 1 Lozenge  1 Lozenge Q2HRS PRN Juan Myles M.D.   1 Lozenge at 01/30/18 0954   • ipratropium-albuterol (DUONEB) nebulizer solution 3 mL  3 mL Q4H PRN (RT) Juan Myles M.D.       • oxycodone-acetaminophen (PERCOCET) 5-325 MG per tablet 1 Tab  1 Tab Q4HRS PRN Jaun Myles M.D.   1 Tab at 01/31/18 0801   • furosemide (LASIX) injection 20 mg  20 mg BID DIURETIC Juan Myles M.D.   20 mg at 01/31/18 0552   • guaiFENesin (ROBITUSSIN) 100 MG/5ML solution 200 mg  10 mL Q6HRS Juan Myles M.D.   200 mg at 01/31/18 0552   • fluoxetine (PROZAC) capsule 20 mg  20 mg DAILY Guicho Alan M.D.   20 mg at 01/31/18 0802   • levothyroxine (SYNTHROID) tablet 25 mcg  25 mcg AM ES Guicho Alan M.D.   25 mcg at 01/31/18 0552   • pregabalin (LYRICA) capsule 150 mg  150 mg Q EVENING Guicho Alan M.D.   150 mg at 01/30/18 2028   • senna-docusate (PERICOLACE or SENOKOT S) 8.6-50 MG per tablet 2 Tab  2 Tab BID Guicho Alan M.D.   2 Tab at 01/31/18 0802    And   • polyethylene glycol/lytes (MIRALAX) PACKET 1 Packet  1 Packet QDAY PRN Guicho Alan M.D.        And   • magnesium hydroxide (MILK OF MAGNESIA) suspension 30 mL  30 mL QDAY PRN Guicho Alan M.D.    30 mL at 01/30/18 1400    And   • bisacodyl (DULCOLAX) suppository 10 mg  10 mg QDAY PRN Guicho Alan M.D.   10 mg at 01/30/18 1738   • Respiratory Care per Protocol   Continuous RT Guicho Aaln M.D.       • acetaminophen (TYLENOL) tablet 650 mg  650 mg Q6HRS PRN Guicho Alan M.D.   650 mg at 01/31/18 0802   • labetalol (NORMODYNE,TRANDATE) injection 10 mg  10 mg Q4HRS PRN Guicho Alan M.D.       • ondansetron (ZOFRAN) syringe/vial injection 4 mg  4 mg Q4HRS PRN Guicho Alan M.D.   4 mg at 01/28/18 2228   • ondansetron (ZOFRAN ODT) dispertab 4 mg  4 mg Q4HRS PRN Guicho Alan M.D.       • potassium chloride SA (Kdur) tablet 20 mEq  20 mEq BID Guicho Alan M.D.   20 mEq at 01/31/18 0802     Last reviewed on 1/22/2018  7:21 PM by Arely Silva [facility]    Quality  Measures:   Labs reviewed, Medications reviewed and Radiology images reviewed   Adam catheter: No Adam       DVT Prophylaxis: Heparin            Problems:  1) Acute Hypoxemic respiratory failure   -Likely secondary to interstitial lung disease.    -Improved with O2 supplementation. Continue to maintain O2 > 94%.   -Will likely need to go on home oxygen.   -Bronchial washing culture negative.    2) Interstitial lung disease   -Clinical presentation and imaging likely points towards ILD.   -Continue steroids for atleast 30 days.   -Will need to be seen in pulmonary clinic at that point to assess clinical status.   -Repeat imaging in 30 days.   -Ideally would need VATS/Wedge biopsy of lung, but give DNR status and hypoxic status of patient will defer the procedure which might potentially require intubation.    3) Severe pulmonary hypertension   -Suspect condition is chronic.   -Recommend PFTs, sleep study and cardiology followup as an outpatient.    4) History of hypertension    5) Hypothyroidism    6) Anemia    7) Depression    8) GERD    Thank you for involving us in care of patient. We will continue to follow.

## 2018-01-31 NOTE — THERAPY
"Physical Therapy Treatment completed.   Bed Mobility:  Supine to Sit: Stand by Assist  Transfers: Sit to Stand: Stand by Assist  Gait: Level Of Assist: Contact Guard Assist with No Equipment Needed       Plan of Care: Will benefit from Physical Therapy 3 times per week  Discharge Recommendations: Equipment: Will Continue to Assess for Equipment Needs.   Post-acute therapy Discharge to home with outpatient or home health for additional skilled therapy services.     Patient able to amb 8 ft x3 reps w/o AD, CGA. Gait distance limited by O2 sats decreasing to high 80s with each amb. Gait quality is slow, no LOB noted. Patient reports she has a 4WW that she plans to use upon d/c. Will continue to follow for d/c needs. Rec patient amb daily with nsg. Expect patient to be okay to d/c to home if remains at current mobility level.     See \"Rehab Therapy-Acute\" Patient Summary Report for complete documentation.       "

## 2018-02-01 LAB
ANION GAP SERPL CALC-SCNC: 8 MMOL/L (ref 0–11.9)
BUN SERPL-MCNC: 32 MG/DL (ref 8–22)
CALCIUM SERPL-MCNC: 8.7 MG/DL (ref 8.5–10.5)
CHLORIDE SERPL-SCNC: 101 MMOL/L (ref 96–112)
CO2 SERPL-SCNC: 25 MMOL/L (ref 20–33)
CREAT SERPL-MCNC: 1.04 MG/DL (ref 0.5–1.4)
GLUCOSE SERPL-MCNC: 97 MG/DL (ref 65–99)
MAGNESIUM SERPL-MCNC: 2 MG/DL (ref 1.5–2.5)
POTASSIUM SERPL-SCNC: 4.4 MMOL/L (ref 3.6–5.5)
SODIUM SERPL-SCNC: 134 MMOL/L (ref 135–145)

## 2018-02-01 PROCEDURE — 770020 HCHG ROOM/CARE - TELE (206)

## 2018-02-01 PROCEDURE — 700102 HCHG RX REV CODE 250 W/ 637 OVERRIDE(OP): Performed by: HOSPITALIST

## 2018-02-01 PROCEDURE — A9270 NON-COVERED ITEM OR SERVICE: HCPCS | Performed by: HOSPITALIST

## 2018-02-01 PROCEDURE — A9270 NON-COVERED ITEM OR SERVICE: HCPCS | Performed by: STUDENT IN AN ORGANIZED HEALTH CARE EDUCATION/TRAINING PROGRAM

## 2018-02-01 PROCEDURE — 700111 HCHG RX REV CODE 636 W/ 250 OVERRIDE (IP): Performed by: INTERNAL MEDICINE

## 2018-02-01 PROCEDURE — 700102 HCHG RX REV CODE 250 W/ 637 OVERRIDE(OP): Performed by: STUDENT IN AN ORGANIZED HEALTH CARE EDUCATION/TRAINING PROGRAM

## 2018-02-01 PROCEDURE — 80048 BASIC METABOLIC PNL TOTAL CA: CPT

## 2018-02-01 PROCEDURE — 700111 HCHG RX REV CODE 636 W/ 250 OVERRIDE (IP): Performed by: HOSPITALIST

## 2018-02-01 PROCEDURE — 83735 ASSAY OF MAGNESIUM: CPT

## 2018-02-01 PROCEDURE — 97535 SELF CARE MNGMENT TRAINING: CPT

## 2018-02-01 PROCEDURE — 36415 COLL VENOUS BLD VENIPUNCTURE: CPT

## 2018-02-01 PROCEDURE — 99232 SBSQ HOSP IP/OBS MODERATE 35: CPT | Performed by: INTERNAL MEDICINE

## 2018-02-01 RX ADMIN — POTASSIUM CHLORIDE 20 MEQ: 1500 TABLET, EXTENDED RELEASE ORAL at 20:45

## 2018-02-01 RX ADMIN — PREGABALIN 150 MG: 150 CAPSULE ORAL at 20:45

## 2018-02-01 RX ADMIN — OXYCODONE HYDROCHLORIDE AND ACETAMINOPHEN 1 TABLET: 5; 325 TABLET ORAL at 12:21

## 2018-02-01 RX ADMIN — OXYCODONE HYDROCHLORIDE AND ACETAMINOPHEN 1 TABLET: 5; 325 TABLET ORAL at 08:14

## 2018-02-01 RX ADMIN — FUROSEMIDE 20 MG: 10 INJECTION, SOLUTION INTRAMUSCULAR; INTRAVENOUS at 05:55

## 2018-02-01 RX ADMIN — FAMOTIDINE 20 MG: 20 TABLET, FILM COATED ORAL at 20:44

## 2018-02-01 RX ADMIN — HEPARIN SODIUM 5000 UNITS: 5000 INJECTION, SOLUTION INTRAVENOUS; SUBCUTANEOUS at 14:16

## 2018-02-01 RX ADMIN — OXYCODONE HYDROCHLORIDE AND ACETAMINOPHEN 1 TABLET: 5; 325 TABLET ORAL at 20:47

## 2018-02-01 RX ADMIN — STANDARDIZED SENNA CONCENTRATE AND DOCUSATE SODIUM 2 TABLET: 8.6; 5 TABLET, FILM COATED ORAL at 20:44

## 2018-02-01 RX ADMIN — PREDNISONE 40 MG: 20 TABLET ORAL at 08:14

## 2018-02-01 RX ADMIN — ACETAMINOPHEN 650 MG: 325 TABLET, FILM COATED ORAL at 20:44

## 2018-02-01 RX ADMIN — LEVOTHYROXINE SODIUM 25 MCG: 25 TABLET ORAL at 05:55

## 2018-02-01 RX ADMIN — GUAIFENESIN 200 MG: 100 SOLUTION ORAL at 05:55

## 2018-02-01 RX ADMIN — FLUOXETINE HYDROCHLORIDE 20 MG: 20 CAPSULE ORAL at 08:14

## 2018-02-01 RX ADMIN — AMITRIPTYLINE HYDROCHLORIDE 100 MG: 50 TABLET, FILM COATED ORAL at 20:44

## 2018-02-01 RX ADMIN — STANDARDIZED SENNA CONCENTRATE AND DOCUSATE SODIUM 2 TABLET: 8.6; 5 TABLET, FILM COATED ORAL at 08:14

## 2018-02-01 RX ADMIN — FAMOTIDINE 20 MG: 20 TABLET, FILM COATED ORAL at 08:14

## 2018-02-01 RX ADMIN — HEPARIN SODIUM 5000 UNITS: 5000 INJECTION, SOLUTION INTRAVENOUS; SUBCUTANEOUS at 20:49

## 2018-02-01 RX ADMIN — HEPARIN SODIUM 5000 UNITS: 5000 INJECTION, SOLUTION INTRAVENOUS; SUBCUTANEOUS at 05:55

## 2018-02-01 RX ADMIN — GUAIFENESIN 200 MG: 100 SOLUTION ORAL at 11:33

## 2018-02-01 RX ADMIN — POTASSIUM CHLORIDE 20 MEQ: 1500 TABLET, EXTENDED RELEASE ORAL at 08:14

## 2018-02-01 ASSESSMENT — ENCOUNTER SYMPTOMS
DEPRESSION: 0
FEVER: 0
ABDOMINAL PAIN: 0
HEMOPTYSIS: 0
COUGH: 0
SPUTUM PRODUCTION: 0
MYALGIAS: 0
DIZZINESS: 0
WEAKNESS: 1
PALPITATIONS: 0
SHORTNESS OF BREATH: 1
NAUSEA: 0
DIAPHORESIS: 0
NECK PAIN: 0
CHILLS: 0

## 2018-02-01 ASSESSMENT — COGNITIVE AND FUNCTIONAL STATUS - GENERAL
HELP NEEDED FOR BATHING: A LITTLE
SUGGESTED CMS G CODE MODIFIER DAILY ACTIVITY: CJ
DAILY ACTIVITIY SCORE: 22
DRESSING REGULAR LOWER BODY CLOTHING: A LITTLE

## 2018-02-01 ASSESSMENT — PAIN SCALES - GENERAL
PAINLEVEL_OUTOF10: 4
PAINLEVEL_OUTOF10: 6
PAINLEVEL_OUTOF10: 8
PAINLEVEL_OUTOF10: 6
PAINLEVEL_OUTOF10: 7

## 2018-02-01 ASSESSMENT — LIFESTYLE VARIABLES: DO YOU DRINK ALCOHOL: NO

## 2018-02-01 NOTE — DISCHARGE PLANNING
We are currently verifying MD acceptance of your patient. This will be resolved ASAP. If you want this escalated for a faster response please call [phone] and press option #2. Thank you for your patience.    Respectfully,   [facility]

## 2018-02-01 NOTE — PROGRESS NOTES
Pt resting in bed, even visible chest rise, no apparent signs of distress, medicated for pain 7/10, no complains of SOB, bed alarm on, call light in place, bed in lowest locked position.

## 2018-02-01 NOTE — THERAPY
"Occupational Therapy Treatment completed with focus on ADLs, ADL transfers and patient education.  Functional Status:  Pt seen for OT tx. Pt given handout and education on where to obtain possible equipment/DME from to assist w/ ADLs and ADL transfers upon appropriate medical d/c home. Pt completed supine > sit w/ SBA, completed LB dressing w/ SBA. During activity, pt on 4L O2 dropped to 70% O2. Pt became dizzy and required a seated rest break. Pt took a while to recover back into the 90's. Attempted to stand 2 more times and pt O2 stats dropped into low 80's. Pt encouraged to get OOB w/ nrsg as tolerated. Pt limited by poor activity tolerance. Pt also limited by decreased O2 staturations needs at this time. Pt completed functional mobility, ADLs and transfers w/ SBA this session.   Plan of Care: Will benefit from Occupational Therapy 3 times per week  Discharge Recommendations:  Equipment Will Continue to Assess for Equipment Needs.     See \"Rehab Therapy-Acute\" Patient Summary Report for complete documentation.   "

## 2018-02-01 NOTE — DISCHARGE PLANNING
SW checked status of HH referral. HH is currently still trying to f/u with pt pcp for acceptance. [facility] will call this SW back when notification from PCP has been received.    Plan: As above.

## 2018-02-01 NOTE — PROGRESS NOTES
Report received at bedside, assumed care. Pt sitting up in bed eatting dinner. A&O x 4. States she has heart burn, pepcid ordered. Pain present, states she will take pain meds with even medications. Satting above 96% on 5 L. No other concerns, complains or distress. Tele box on. Chart reviewed. Bed in lowest position, treaded slipper sock on, and call light within reach.

## 2018-02-01 NOTE — PROGRESS NOTES
Pulmonary Progress Note        Chief Complaint:  Exertional dyspnea accompanied by dry cough and fatigue    History of Present Illness: 77 y.o. female admitted for worsening dyspnea for 2 weeks but increasing symptoms including fatigue for at least 6 months.  Also diffuse chest discomfort and non productive cough. No sx of CVD or known malignancy.  No h/o contact with birds or HIV risks.  No significant weight loss or change in bowel habits.      ROS:  Respiratory: Dyspnea at rest aggravated upon exertion, Cardiac: No palpitations anginal chest pain, dependent edema,, GI: Admits retrosternal burning and symptoms of reflux. No nausea or vomiting or abdominal pain. Musculoskeletal : Severe backache. All other systems negative.    Interval Events:  Bronchoscopy did not reveal any abnormal findings.  24 hour interval history reviewed    01/29/18: S/p bronchoscopy with bronchial washing yesterday. Afebrile. Has non productive cough and dyspnea. No hemoptysis. Patient was being seen by Dr Winters and I have taken over the patient from today.    1/30/18: Oral steroids were started yesterday. Patient admits improvement in breathing. Still develops dyspnea upon minor exertion and requires oxygen supplementation. Afebrile.    1/31/18: Patient continues to improve. Oxygen requirements now down to 4L and satting at 95%.    2/1/18: Patient had a GLF yesterday while attempting ambulation. She feels about the same, but does feel better since admission. Continues to be on 4 L of O2 via NC.    PFSH:  No change.    Respiratory:      Pulse oximetery is 97% on O2 6l/min    Patient still c/o non specific sarah upper chest pain on inspiration       Exam: Bilateral end-inspiratory crepitations more in bases. No wheeze  Imaging: CXR dated 02/28/18 reviewed shows perihilar prominence bilaterally with diffuse patchy opacity. No consolidation or effusion.     CT reviewed  1.  Hazy and groundglass bilateral opacities suggests underlying edema  and/or infiltrates.  2.  Enlarged mediastinal lymph nodes, consider causes of adenopathy with additional workup as clinically appropriate.  3.  Atherosclerosis  4.  Cholelithiasis  5.  Small hiatal hernia  6.  Diverticulosis  7.  Bilateral pulmonary nodules measuring up to 10.5 mm. See nodule follow-up recommendations below    HemoDynamics:  Pulse: 73  Blood Pressure : 108/61       Exam: First and second heart sounds audible. NO murmur or pericardial rub     ECHO-  Normal left ventricular chamber size.  Left ventricular ejection fraction is visually estimated to be 60%.  Moderately dilated right ventricle.  Normal right ventricular systolic function.  Moderate tricuspid regurgitation.   Right ventricular systolic pressure is estimated to be 65-70 mmHg        Neuro:  GCS  15    Exam: Tendon jerks intact. No focal neurological deficit.       Fluids:  Intake/Output       01/30/18 0700 - 01/31/18 0659 01/31/18 0700 - 02/01/18 0659 02/01/18 0700 - 02/02/18 0659      4417-1420 6693-0476 Total 8526-8672 9041-0558 Total 5711-2068 8919-5649 Total       Intake    P.O.  600  360 960  520  -- 520  --  -- --    P.O. 600 360 960 520 -- 520 -- -- --    Total Intake 600 360 960 520 -- 520 -- -- --       Output    Urine  700  -- 700  200  -- 200  --  -- --    Number of Times Voided -- 1 x 1 x 1 x -- 1 x -- -- --    Void (ml) 700 -- 700 200 -- 200 -- -- --    Stool  --  -- --  --  -- --  --  -- --    Number of Times Stooled -- -- -- 2 x -- 2 x -- -- --    Total Output 700 -- 700 200 -- 200 -- -- --       Net I/O     -100 360 260 320 -- 320 -- -- --        Weight: 64.1 kg (141 lb 5 oz)  Recent Labs      01/30/18   0153  02/01/18   0159   SODIUM  135  134*   POTASSIUM  4.4  4.4   CHLORIDE  100  101   CO2  25  25   BUN  31*  32*   CREATININE  0.92  1.04   MAGNESIUM   --   2.0   CALCIUM  8.8  8.7       GI/Nutrition:  Exam: abdomen is soft and non-tender. Bowel sounds audible  Oral feeding   Liver Function  Recent Labs      01/30/18    0153  18   0159   GLUCOSE  113*  97       Heme:  Recent Labs      18   RBC  3.76*   HEMOGLOBIN  11.2*   HEMATOCRIT  34.5*   PLATELETCT  382       Infectious Disease:  Temp  Av.8 °C (98.2 °F)  Min: 36.5 °C (97.7 °F)  Max: 37.2 °C (99 °F)  Micro:    BC no growth in 5 days.  Bronchial washings negative for AFB or fungal elements.  HIV antibodies negative  FLACA: negative    Recent Labs      18   WBC  12.6*   NEUTSPOLYS  78.20*   LYMPHOCYTES  13.50*   MONOCYTES  7.40   EOSINOPHILS  0.10   BASOPHILS  0.10     Current Facility-Administered Medications   Medication Dose Frequency Provider Last Rate Last Dose   • famotidine (PEPCID) tablet 20 mg  20 mg BID Khushboo Moran M.D.   20 mg at 18 08   • carvedilol (COREG) tablet 3.125 mg  3.125 mg DAILY Tate Villarreal M.D.   Stopped at 18 0900   • predniSONE (DELTASONE) tablet 40 mg  40 mg DAILY Muhammad K Gondal, M.D.   40 mg at 18 0814   • amitriptyline (ELAVIL) tablet 100 mg  100 mg QHS Juan Myles M.D.   100 mg at 18 2043   • heparin injection 5,000 Units  5,000 Units Q8HRS Juan Myles M.D.   5,000 Units at 18 0555   • benzocaine-menthol (CEPACOL) lozenge 1 Lozenge  1 Lozenge Q2HRS PRN Juan Myles M.D.   1 Lozenge at 18 0954   • ipratropium-albuterol (DUONEB) nebulizer solution 3 mL  3 mL Q4H PRN (RT) Juan Myles M.D.       • oxycodone-acetaminophen (PERCOCET) 5-325 MG per tablet 1 Tab  1 Tab Q4HRS PRN Juan Myles M.D.   1 Tab at 18 0814   • furosemide (LASIX) injection 20 mg  20 mg BID DIURETIC Juan Myles M.D.   20 mg at 18 0555   • guaiFENesin (ROBITUSSIN) 100 MG/5ML solution 200 mg  10 mL Q6HRS Juan Myles M.D.   200 mg at 18 0555   • fluoxetine (PROZAC) capsule 20 mg  20 mg DAILY Guicho Alan M.D.   20 mg at 18 0814   • levothyroxine (SYNTHROID) tablet 25 mcg  25 mcg AM ES Guicho Alan M.D.   25 mcg  at 02/01/18 0555   • pregabalin (LYRICA) capsule 150 mg  150 mg Q EVENING Guicho Alan M.D.   150 mg at 01/31/18 2043   • senna-docusate (PERICOLACE or SENOKOT S) 8.6-50 MG per tablet 2 Tab  2 Tab BID Guicho Alan M.D.   2 Tab at 02/01/18 0814    And   • polyethylene glycol/lytes (MIRALAX) PACKET 1 Packet  1 Packet QDAY PRN Guicho Alan M.D.        And   • magnesium hydroxide (MILK OF MAGNESIA) suspension 30 mL  30 mL QDAY PRN Guicho Alan M.D.   30 mL at 01/30/18 1400    And   • bisacodyl (DULCOLAX) suppository 10 mg  10 mg QDAY PRN Guicho Alan M.D.   10 mg at 01/30/18 1738   • Respiratory Care per Protocol   Continuous RT Guicho Alan M.D.       • acetaminophen (TYLENOL) tablet 650 mg  650 mg Q6HRS PRN Guicho Alan M.D.   650 mg at 01/31/18 2043   • labetalol (NORMODYNE,TRANDATE) injection 10 mg  10 mg Q4HRS PRN Guicho Alan M.D.       • ondansetron (ZOFRAN) syringe/vial injection 4 mg  4 mg Q4HRS PRN Guicho Alan M.D.   4 mg at 01/28/18 2228   • ondansetron (ZOFRAN ODT) dispertab 4 mg  4 mg Q4HRS PRN Guicho Alan M.D.       • potassium chloride SA (Kdur) tablet 20 mEq  20 mEq BID Guicho Alan M.D.   20 mEq at 02/01/18 0814     Last reviewed on 1/22/2018  7:21 PM by Tami Alonzo    Quality  Measures:  Labs reviewed, Medications reviewed and Radiology images reviewed  Adam catheter: No Aadm      DVT Prophylaxis: Heparin            Problems:  1) Acute Hypoxemic respiratory failure   -Likely secondary to interstitial lung disease.    -Improved with O2 supplementation. Continue to maintain O2 > 94%.   -Will likely need to go on home oxygen.   -Bronchial washing culture negative.    2) Interstitial lung disease   -Clinical presentation and imaging indicates ILD.   -Continue steroids for atleast 30 days.   -Will need to be seen in pulmonary clinic at that point to assess clinical status.   -Repeat imaging in 30 days.   -Ideally would need VATS/Wedge biopsy of lung, but given DNR status and  hypoxic status of patient will defer the procedure which might potentially require intubation.    3) Severe pulmonary hypertension   -Suspect condition is chronic.   -Recommend PFTs, sleep study and cardiology followup as an outpatient.    4) History of hypertension    5) Hypothyroidism    6) Anemia    7) Depression    8) GERD    Thank you for involving us in care of patient. We will continue to follow.

## 2018-02-01 NOTE — PROGRESS NOTES
Patient is sleeping comfortably in bed, no signs of distress, even unlabored breathing, satting above 97% on 5 L, will continue to monitor.

## 2018-02-01 NOTE — CARE PLAN
Problem: Safety  Goal: Will remain free from falls    Intervention: Assess risk factors for falls  Educated patient on use of call light, no slip socks on, bed lowest position. All needs attended to. Patient verbalized understanding.         Problem: Pain Management  Goal: Pain level will decrease to patient's comfort goal    Intervention: Follow pain managment plan developed in collaboration with patient and Interdisciplinary Team  Listened to patients discussion of pain. Discussed with patient pain goal and management through medications. Supported and educated patient by addressing specific questions regarding diagnosis, risks, benefits of pain management treatment.

## 2018-02-01 NOTE — PROGRESS NOTES
Report received at the bed side. No family at bedside. Pain 6/10, will medicate. No complains of SOB. Call light and belongings within reach. Bed alarm in place. Bed in lowest position.

## 2018-02-01 NOTE — PROGRESS NOTES
[facility] Hospitalist Progress Note    Date of Service: 2018    Chief Complaint  77 y.o. female admitted 2018 with pneumonia and respiratory failure.    Interval Problem Update  Resp failure-no real change. Witnessed patient being ambulated today and desaturated to the mid 80's on 4-5 L NC. Very weak as well. Tolerating steroids without issue.     Consultants/Specialty  Pulm.     Disposition  Tbd. HH versus SNF?        Review of Systems   Constitutional: Negative for chills, diaphoresis and fever.        Feels a little stronger today   HENT: Negative for tinnitus.    Respiratory: Positive for shortness of breath. Negative for cough, hemoptysis and sputum production.         Remains about the same today     Cardiovascular: Negative for palpitations.   Gastrointestinal: Negative for abdominal pain and nausea.   Genitourinary: Negative for dysuria and frequency.   Musculoskeletal: Negative for myalgias and neck pain.   Skin: Negative for itching.   Neurological: Positive for weakness (about the same today). Negative for dizziness.   Psychiatric/Behavioral: Negative for depression.   All other systems reviewed and are negative.     Physical Exam  Laboratory/Imaging   Hemodynamics  Temp (24hrs), Av.7 °C (98.1 °F), Min:36.5 °C (97.7 °F), Max:37.2 °C (99 °F)   Temperature: 36.7 °C (98.1 °F)  Pulse  Av.5  Min: 61  Max: 100    Blood Pressure : 107/62      Respiratory      Respiration: 16, Pulse Oximetry: 98 %     Work Of Breathing / Effort: Mild  RUL Breath Sounds: Clear, RML Breath Sounds: Diminished;Crackles, RLL Breath Sounds: Diminished;Crackles, ROSSY Breath Sounds: Clear, LLL Breath Sounds: Diminished;Crackles    Fluids    Intake/Output Summary (Last 24 hours) at 18 1501  Last data filed at 18 1400   Gross per 24 hour   Intake              360 ml   Output              200 ml   Net              160 ml       Nutrition  Orders Placed This Encounter   Procedures   • DIET ORDER     Standing Status:    Standing     Number of Occurrences:   1     Order Specific Question:   Diet:     Answer:   Regular [1]     Physical Exam   Constitutional: She is oriented to person, place, and time. No distress.   Standing up, appears somewhat weak   Eyes: No scleral icterus.   Cardiovascular: Normal rate, regular rhythm and normal heart sounds.    Pulmonary/Chest: Effort normal. No stridor. No respiratory distress. She has no wheezes. She has rales (velcro more prominent at the bases, B/L, no change in level of aeration today).   No use of accessory resp muscles at this time   Abdominal: Soft. Bowel sounds are normal. She exhibits no distension. There is no tenderness.   Musculoskeletal: Normal range of motion. She exhibits no tenderness.   Neurological: She is oriented to person, place, and time. She exhibits normal muscle tone.   Skin: No erythema.   Psychiatric: She has a normal mood and affect.   Nursing note and vitals reviewed.      Recent Labs      01/30/18   0153   WBC  12.6*   RBC  3.76*   HEMOGLOBIN  11.2*   HEMATOCRIT  34.5*   MCV  91.8   MCH  29.8   MCHC  32.5*   RDW  41.1   PLATELETCT  382   MPV  8.8*     Recent Labs      01/30/18   0153  02/01/18   0159   SODIUM  135  134*   POTASSIUM  4.4  4.4   CHLORIDE  100  101   CO2  25  25   GLUCOSE  113*  97   BUN  31*  32*   CREATININE  0.92  1.04   CALCIUM  8.8  8.7                      Assessment/Plan     Hypokalemia- (present on admission)   Assessment & Plan    --stable at this time, monitor while on diuretics        CAP (community acquired pneumonia)- (present on admission)   Assessment & Plan    -stopped levaquin already, concerning for ILD/IPF?, see above  -see above  -try to wean o2  -RT protocol, monitor volume status closely        Hypertension- (present on admission)   Assessment & Plan    -blood pressure is still on the low side, no changes planned at this time  --stop coreg, continue low dose diuretics  -continue to hold other outpatient BP meds at this  time  -adjust PRN        Acute respiratory failure with hypoxia (CMS-HCC)- (present on admission)   Assessment & Plan    Multifactorial due to pneumonia, pulmonary edema, possible ILD, most likely the latter  -FLACA is negative  -s/p bronch, BAL's negative thus far, pathology of the fluid is pending at this time  -continue empiric steroids, likely will need prolonged course  -will need home o2, very slow improvement, consider increasing steroids, still quite hypoxic with mild ambulation  -unclear if she will need wedge bx or not?  -ECHO is ok        Normochromic normocytic anemia- (present on admission)   Assessment & Plan    Monitor H&H if she drops 7 or 21 or becomes unstable transfuse.  Hb stable.         Hyperlipidemia with target LDL less than 100- (present on admission)   Assessment & Plan    Continued low-fat low-cholesterol diet, monitor fasting lipid panel periodically.  F/u as outpatient.          Anxiety and depression- (present on admission)   Assessment & Plan    Patient is on Prozac for her depression, she is also on Elavil at nighttime.  Stable.        Fibromyalgia- (present on admission)   Assessment & Plan    Continue with pain management for her fibromyalgia. Patient remains on Lyrica.        Hypothyroid- (present on admission)   Assessment & Plan    -supportive measures with synthroid supplementation at 25 mcg per day, no change  -latest TSH is 0.730 and this is with in establish normal guidlines   Stable.             Reviewed items::  Labs reviewed and Medications reviewed  DVT prophylaxis - mechanical:  SCDs  Antibiotics:  Treating active infection/contamination beyond 24 hours perioperative coverage

## 2018-02-01 NOTE — DISCHARGE PLANNING
HH denied by [facility]. Out of network for this pt. SW to f/u with other HH providers to see which is accepting pt insurance.

## 2018-02-01 NOTE — PROGRESS NOTES
[facility] Hospitalist Progress Note    Date of Service: 2018    Chief Complaint  77 y.o. female admitted 2018 with pneumonia and respiratory failure.    Interval Problem Update  Resp failure-appears to be ILD, patient had a slight decrease in her o2 requirements. Feels a little more comfortable. Had a small GLF today with no specific trauma. Denies any new blurry vision, numbness or weakness.    Consultants/Specialty  Pulm.     Disposition  Tbd.         Review of Systems   Constitutional: Negative for chills and fever.        Feels a little stronger today   HENT: Negative for tinnitus.    Eyes: Negative for blurred vision.   Respiratory: Positive for shortness of breath. Negative for cough, hemoptysis and sputum production.         Modest improvement   Cardiovascular: Negative for chest pain.   Gastrointestinal: Negative for nausea and vomiting.   Genitourinary: Negative for frequency and urgency.   Musculoskeletal: Negative for myalgias and neck pain.   Neurological: Negative for dizziness.   Endo/Heme/Allergies: Does not bruise/bleed easily.   Psychiatric/Behavioral: Negative for depression.   All other systems reviewed and are negative.     Physical Exam  Laboratory/Imaging   Hemodynamics  Temp (24hrs), Av.6 °C (97.8 °F), Min:36.2 °C (97.2 °F), Max:37.2 °C (99 °F)   Temperature: 37.2 °C (99 °F)  Pulse  Av.5  Min: 61  Max: 100    Blood Pressure : 107/57      Respiratory      Respiration: 16, Pulse Oximetry: 97 %     Work Of Breathing / Effort: Mild  RUL Breath Sounds: Clear, RML Breath Sounds: Diminished, RLL Breath Sounds: Diminished;Crackles, ROSSY Breath Sounds: Clear, LLL Breath Sounds: Diminished;Crackles    Fluids    Intake/Output Summary (Last 24 hours) at 18 4137  Last data filed at 18 1600   Gross per 24 hour   Intake              880 ml   Output              200 ml   Net              680 ml       Nutrition  Orders Placed This Encounter   Procedures   • DIET ORDER     Standing  Status:   Standing     Number of Occurrences:   1     Order Specific Question:   Diet:     Answer:   Regular [1]     Physical Exam   Constitutional: She is oriented to person, place, and time. No distress.   Eyes: No scleral icterus.   Cardiovascular: Normal rate, regular rhythm and normal heart sounds.    Pulmonary/Chest: Effort normal. No stridor. No respiratory distress. She has no wheezes. She has rales (velcro more prominent at the bases, B/L, about the same level of aeration today).   No use of accessory resp muscles at this time   Abdominal: Soft. Bowel sounds are normal. There is no tenderness.   Musculoskeletal: Normal range of motion. She exhibits no edema or tenderness.   Neurological: She is oriented to person, place, and time. She exhibits normal muscle tone.   Skin: No erythema.   Psychiatric: She has a normal mood and affect.   Nursing note and vitals reviewed.      Recent Labs      01/30/18   0153   WBC  12.6*   RBC  3.76*   HEMOGLOBIN  11.2*   HEMATOCRIT  34.5*   MCV  91.8   MCH  29.8   MCHC  32.5*   RDW  41.1   PLATELETCT  382   MPV  8.8*     Recent Labs      01/30/18   0153   SODIUM  135   POTASSIUM  4.4   CHLORIDE  100   CO2  25   GLUCOSE  113*   BUN  31*   CREATININE  0.92   CALCIUM  8.8                      Assessment/Plan     Hypokalemia- (present on admission)   Assessment & Plan    --stable at this time, monitor while on diuretics        CAP (community acquired pneumonia)- (present on admission)   Assessment & Plan    -stopped levaquin already, concerning for ILD/IPF?, see above  -see above  -try to wean o2  -RT protocol, monitor volume status closely        Hypertension- (present on admission)   Assessment & Plan    -blood pressure is still on the low side, no changes planned at this time  -reduce coreg again, low dose diuretics for lung issues  -continue to hold other outpatient BP meds at this time  -adjust PRN        Acute respiratory failure with hypoxia (CMS-HCC)- (present on  admission)   Assessment & Plan    Multifactorial due to pneumonia, pulmonary edema, possible ILD, most likely the latter  -FLACA is negative  -s/p bronch, BAL's negative thus far, pathology of the fluid is pending at this time  -continue empiric steroids, likely will need prolonged course  -will need home o2, slowly improving  -unclear if she will need wedge bx or not?  -ECHO is ok        Normochromic normocytic anemia- (present on admission)   Assessment & Plan    Monitor H&H if she drops 7 or 21 or becomes unstable transfuse.  Hb stable.         Hyperlipidemia with target LDL less than 100- (present on admission)   Assessment & Plan    Continued low-fat low-cholesterol diet, monitor fasting lipid panel periodically.  F/u as outpatient.          Anxiety and depression- (present on admission)   Assessment & Plan    Patient is on Prozac for her depression, she is also on Elavil at nighttime.  Stable.        Fibromyalgia- (present on admission)   Assessment & Plan    Continue with pain management for her fibromyalgia. Patient remains on Lyrica.        Hypothyroid- (present on admission)   Assessment & Plan    -supportive measures with synthroid supplementation at 25 mcg per day, no change  -latest TSH is 0.730 and this is with in establish normal guidlines   Stable.             Reviewed items::  Labs reviewed and Medications reviewed  DVT prophylaxis - mechanical:  SCDs  Antibiotics:  Treating active infection/contamination beyond 24 hours perioperative coverage

## 2018-02-01 NOTE — PROGRESS NOTES
Pt resting in bed, states pain is better after meds. Safety and fall precautions in place, will continue to monitor. Call light in reach

## 2018-02-02 LAB
ANION GAP SERPL CALC-SCNC: 8 MMOL/L (ref 0–11.9)
BUN SERPL-MCNC: 36 MG/DL (ref 8–22)
CALCIUM SERPL-MCNC: 8.6 MG/DL (ref 8.5–10.5)
CHLORIDE SERPL-SCNC: 102 MMOL/L (ref 96–112)
CO2 SERPL-SCNC: 25 MMOL/L (ref 20–33)
CREAT SERPL-MCNC: 0.96 MG/DL (ref 0.5–1.4)
GLUCOSE SERPL-MCNC: 90 MG/DL (ref 65–99)
MAGNESIUM SERPL-MCNC: 2 MG/DL (ref 1.5–2.5)
POTASSIUM SERPL-SCNC: 4.4 MMOL/L (ref 3.6–5.5)
SODIUM SERPL-SCNC: 135 MMOL/L (ref 135–145)

## 2018-02-02 PROCEDURE — 700111 HCHG RX REV CODE 636 W/ 250 OVERRIDE (IP): Performed by: HOSPITALIST

## 2018-02-02 PROCEDURE — 97116 GAIT TRAINING THERAPY: CPT

## 2018-02-02 PROCEDURE — 700102 HCHG RX REV CODE 250 W/ 637 OVERRIDE(OP): Performed by: INTERNAL MEDICINE

## 2018-02-02 PROCEDURE — A9270 NON-COVERED ITEM OR SERVICE: HCPCS | Performed by: STUDENT IN AN ORGANIZED HEALTH CARE EDUCATION/TRAINING PROGRAM

## 2018-02-02 PROCEDURE — A9270 NON-COVERED ITEM OR SERVICE: HCPCS | Performed by: INTERNAL MEDICINE

## 2018-02-02 PROCEDURE — A9270 NON-COVERED ITEM OR SERVICE: HCPCS | Performed by: HOSPITALIST

## 2018-02-02 PROCEDURE — 97530 THERAPEUTIC ACTIVITIES: CPT

## 2018-02-02 PROCEDURE — 700102 HCHG RX REV CODE 250 W/ 637 OVERRIDE(OP): Performed by: HOSPITALIST

## 2018-02-02 PROCEDURE — 36415 COLL VENOUS BLD VENIPUNCTURE: CPT

## 2018-02-02 PROCEDURE — 99232 SBSQ HOSP IP/OBS MODERATE 35: CPT | Performed by: INTERNAL MEDICINE

## 2018-02-02 PROCEDURE — 80048 BASIC METABOLIC PNL TOTAL CA: CPT

## 2018-02-02 PROCEDURE — 770020 HCHG ROOM/CARE - TELE (206)

## 2018-02-02 PROCEDURE — 700102 HCHG RX REV CODE 250 W/ 637 OVERRIDE(OP): Performed by: STUDENT IN AN ORGANIZED HEALTH CARE EDUCATION/TRAINING PROGRAM

## 2018-02-02 PROCEDURE — 700111 HCHG RX REV CODE 636 W/ 250 OVERRIDE (IP): Performed by: INTERNAL MEDICINE

## 2018-02-02 PROCEDURE — 83735 ASSAY OF MAGNESIUM: CPT

## 2018-02-02 RX ORDER — FUROSEMIDE 20 MG/1
20 TABLET ORAL
Status: DISCONTINUED | OUTPATIENT
Start: 2018-02-02 | End: 2018-02-03 | Stop reason: HOSPADM

## 2018-02-02 RX ORDER — PREDNISONE 50 MG/1
50 TABLET ORAL DAILY
Status: DISCONTINUED | OUTPATIENT
Start: 2018-02-03 | End: 2018-02-03 | Stop reason: HOSPADM

## 2018-02-02 RX ADMIN — GUAIFENESIN 200 MG: 100 SOLUTION ORAL at 00:00

## 2018-02-02 RX ADMIN — HEPARIN SODIUM 5000 UNITS: 5000 INJECTION, SOLUTION INTRAVENOUS; SUBCUTANEOUS at 21:52

## 2018-02-02 RX ADMIN — HEPARIN SODIUM 5000 UNITS: 5000 INJECTION, SOLUTION INTRAVENOUS; SUBCUTANEOUS at 13:55

## 2018-02-02 RX ADMIN — GUAIFENESIN 200 MG: 100 SOLUTION ORAL at 11:29

## 2018-02-02 RX ADMIN — POTASSIUM CHLORIDE 20 MEQ: 1500 TABLET, EXTENDED RELEASE ORAL at 09:18

## 2018-02-02 RX ADMIN — POTASSIUM CHLORIDE 20 MEQ: 1500 TABLET, EXTENDED RELEASE ORAL at 21:52

## 2018-02-02 RX ADMIN — FUROSEMIDE 20 MG: 20 TABLET ORAL at 17:33

## 2018-02-02 RX ADMIN — OXYCODONE HYDROCHLORIDE AND ACETAMINOPHEN 1 TABLET: 5; 325 TABLET ORAL at 17:31

## 2018-02-02 RX ADMIN — FAMOTIDINE 20 MG: 20 TABLET, FILM COATED ORAL at 09:17

## 2018-02-02 RX ADMIN — GUAIFENESIN 200 MG: 100 SOLUTION ORAL at 06:01

## 2018-02-02 RX ADMIN — AMITRIPTYLINE HYDROCHLORIDE 100 MG: 50 TABLET, FILM COATED ORAL at 21:53

## 2018-02-02 RX ADMIN — FUROSEMIDE 20 MG: 10 INJECTION, SOLUTION INTRAMUSCULAR; INTRAVENOUS at 06:00

## 2018-02-02 RX ADMIN — PREDNISONE 40 MG: 20 TABLET ORAL at 09:17

## 2018-02-02 RX ADMIN — FAMOTIDINE 20 MG: 20 TABLET, FILM COATED ORAL at 21:53

## 2018-02-02 RX ADMIN — FLUOXETINE HYDROCHLORIDE 20 MG: 20 CAPSULE ORAL at 09:17

## 2018-02-02 RX ADMIN — LEVOTHYROXINE SODIUM 25 MCG: 25 TABLET ORAL at 06:01

## 2018-02-02 RX ADMIN — HEPARIN SODIUM 5000 UNITS: 5000 INJECTION, SOLUTION INTRAVENOUS; SUBCUTANEOUS at 06:00

## 2018-02-02 RX ADMIN — GUAIFENESIN 200 MG: 100 SOLUTION ORAL at 17:04

## 2018-02-02 RX ADMIN — PREGABALIN 150 MG: 150 CAPSULE ORAL at 21:53

## 2018-02-02 RX ADMIN — OXYCODONE HYDROCHLORIDE AND ACETAMINOPHEN 1 TABLET: 5; 325 TABLET ORAL at 09:21

## 2018-02-02 ASSESSMENT — PAIN SCALES - GENERAL
PAINLEVEL_OUTOF10: 2
PAINLEVEL_OUTOF10: 0
PAINLEVEL_OUTOF10: 8
PAINLEVEL_OUTOF10: 4
PAINLEVEL_OUTOF10: 7

## 2018-02-02 ASSESSMENT — ENCOUNTER SYMPTOMS
FEVER: 0
DIAPHORESIS: 0
SHORTNESS OF BREATH: 1
SPUTUM PRODUCTION: 0
HEMOPTYSIS: 0
MYALGIAS: 0
DEPRESSION: 0
VOMITING: 0
WEAKNESS: 1
COUGH: 0
PALPITATIONS: 0
DIZZINESS: 0
CHILLS: 0
NAUSEA: 0

## 2018-02-02 ASSESSMENT — GAIT ASSESSMENTS
DEVIATION: DECREASED BASE OF SUPPORT
DISTANCE (FEET): 20
GAIT LEVEL OF ASSIST: CONTACT GUARD ASSIST

## 2018-02-02 ASSESSMENT — COGNITIVE AND FUNCTIONAL STATUS - GENERAL
SUGGESTED CMS G CODE MODIFIER MOBILITY: CJ
WALKING IN HOSPITAL ROOM: A LITTLE
MOBILITY SCORE: 22
CLIMB 3 TO 5 STEPS WITH RAILING: A LITTLE

## 2018-02-02 ASSESSMENT — LIFESTYLE VARIABLES: DO YOU DRINK ALCOHOL: NO

## 2018-02-02 NOTE — PROGRESS NOTES
Patient is sleeping comfortably in bed, no signs of distress, even unlabored breathing, satting at 98% on 4 L, will continue to monitor.

## 2018-02-02 NOTE — PROGRESS NOTES
Pulmonary Progress Note        Chief Complaint:  Exertional dyspnea accompanied by dry cough and fatigue    History of Present Illness: 77 y.o. female admitted for worsening dyspnea for 2 weeks but increasing symptoms including fatigue for at least 6 months.  Also diffuse chest discomfort and non productive cough. No sx of CVD or known malignancy.  No h/o contact with birds or HIV risks.  No significant weight loss or change in bowel habits.      ROS:  Respiratory: Dyspnea at rest aggravated upon exertion, no hemoptysis. Cardiac: No palpitations, anginal chest pain, dependent edema,, GI: Admits retrosternal burning and symptoms of reflux. No nausea or vomiting or abdominal pain. Musculoskeletal : Severe backache. All other systems negative.    Interval Events:  Bronchoscopy did not reveal any abnormal findings.  24 hour interval history reviewed    01/29/18: S/p bronchoscopy with bronchial washing yesterday. Afebrile. Has non productive cough and dyspnea. No hemoptysis. Patient was being seen by Dr Winters and I have taken over the patient from today.    1/30/18: Oral steroids were started yesterday. Patient admits improvement in breathing. Still develops dyspnea upon minor exertion and requires oxygen supplementation. Afebrile.    1/31/18: Patient continues to improve. Oxygen requirements now down to 4L and satting at 95%.    2/1/18: Patient had a GLF yesterday while attempting ambulation. She feels about the same, but does feel better since admission. Continues to be on 4 L of O2 via [place].    2/2/18: Patient is comfortable while resting in bed but develops dyspnea upon exertion. Also develops hypoxia upon exertion despite on O2 supplementation. Denies pleuritic chest pain, chest tightness or wheezing. No dependent edema. Afebrile    PFSH:  No change.    Respiratory:      Pulse oximetery is 97% on O2 6l/min    Patient still c/o non specific sarah upper chest pain on inspiration       Exam: Bilateral end-inspiratory  crepitations more in bases. No wheeze  Imaging: CXR dated 02/28/18 reviewed shows perihilar prominence bilaterally with diffuse patchy opacity. No consolidation or effusion.     CT reviewed  1.  Hazy and groundglass bilateral opacities suggests underlying edema and/or infiltrates.  2.  Enlarged mediastinal lymph nodes, consider causes of adenopathy with additional workup as clinically appropriate.  3.  Atherosclerosis  4.  Cholelithiasis  5.  Small hiatal hernia  6.  Diverticulosis  7.  Bilateral pulmonary nodules measuring up to 10.5 mm. See nodule follow-up recommendations below    HemoDynamics:  Pulse: 74  Blood Pressure : 111/82       Exam: First and second heart sounds audible. NO murmur or pericardial rub     ECHO-  Normal left ventricular chamber size.  Left ventricular ejection fraction is visually estimated to be 60%.  Moderately dilated right ventricle.  Normal right ventricular systolic function.  Moderate tricuspid regurgitation.   Right ventricular systolic pressure is estimated to be 65-70 mmHg        Neuro:  GCS  15    Exam: Tendon jerks intact. No focal neurological deficit.       Fluids:  Intake/Output       01/31/18 0700 - 02/01/18 0659 02/01/18 0700 - 02/02/18 0659 02/02/18 0700 - 02/03/18 0659      5420-2621 0872-0479 Total 6508-7009 2725-6456 Total 0719-6682 7823-4262 Total       Intake    P.O.  520  -- 520  600  -- 600  240  -- 240    P.O. 520 -- 520 600 -- 600 240 -- 240    Total Intake 520 -- 520 600 -- 600 240 -- 240       Output    Urine  200  -- 200  --  -- --  --  -- --    Number of Times Voided 1 x -- 1 x 2 x -- 2 x 1 x -- 1 x    Void (ml) 200 -- 200 -- -- -- -- -- --    Stool  --  -- --  --  -- --  --  -- --    Number of Times Stooled 2 x -- 2 x 1 x -- 1 x 1 x -- 1 x    Total Output 200 -- 200 -- -- -- -- -- --       Net I/O     320 -- 320 600 -- 600 240 -- 240        Weight: 64.6 kg (142 lb 6.7 oz)  Recent Labs      02/01/18   0159  02/02/18   0223   SODIUM  134*  135   POTASSIUM  4.4   4.4   CHLORIDE  101  102   CO2  25  25   BUN  32*  36*   CREATININE  1.04  0.96   MAGNESIUM  2.0  2.0   CALCIUM  8.7  8.6       GI/Nutrition:  Exam: abdomen is soft and non-tender. Bowel sounds audible  Oral feeding   Liver Function  Recent Labs      18   0159  18   0223   GLUCOSE  97  90       Heme:  No results for input(s): RBC, HEMOGLOBIN, HEMATOCRIT, PLATELETCT, PROTHROMBTM, APTT, INR, IRON, FERRITIN, TOTIRONBC in the last 72 hours.    Infectious Disease:  Temp  Av.6 °C (97.8 °F)  Min: 36.2 °C (97.1 °F)  Max: 37 °C (98.6 °F)  Micro:    BC no growth in 5 days.  Bronchial washings negative for AFB or fungal elements.  HIV antibodies negative  FLACA: negative        Current Facility-Administered Medications   Medication Dose Frequency Provider Last Rate Last Dose   • [START ON 2/3/2018] predniSONE (DELTASONE) tablet 50 mg  50 mg DAILY Tate Villarreal M.D.       • famotidine (PEPCID) tablet 20 mg  20 mg BID Khushboo Moran M.D.   20 mg at 18 0917   • amitriptyline (ELAVIL) tablet 100 mg  100 mg QHS Juan Myles M.D.   100 mg at 18 2044   • heparin injection 5,000 Units  5,000 Units Q8HRS Juan Myles M.D.   5,000 Units at 18 0600   • benzocaine-menthol (CEPACOL) lozenge 1 Lozenge  1 Lozenge Q2HRS PRN Juan Myles M.D.   1 Lozenge at 18 0954   • ipratropium-albuterol (DUONEB) nebulizer solution 3 mL  3 mL Q4H PRN (RT) Juan Myles M.D.       • oxycodone-acetaminophen (PERCOCET) 5-325 MG per tablet 1 Tab  1 Tab Q4HRS PRN Juan Myles M.D.   1 Tab at 18 0921   • furosemide (LASIX) injection 20 mg  20 mg BID DIURETIC Juan Myles M.D.   20 mg at 18 0600   • guaiFENesin (ROBITUSSIN) 100 MG/5ML solution 200 mg  10 mL Q6HRS Juan Myles M.D.   200 mg at 18 1129   • fluoxetine (PROZAC) capsule 20 mg  20 mg DAILY Guicho Alan M.D.   20 mg at 18 0917   • levothyroxine (SYNTHROID) tablet 25 mcg  25  mcg AM ES Guicho Alan M.D.   25 mcg at 02/02/18 0601   • pregabalin (LYRICA) capsule 150 mg  150 mg Q EVENING Guicho Alan M.D.   150 mg at 02/01/18 2045   • senna-docusate (PERICOLACE or SENOKOT S) 8.6-50 MG per tablet 2 Tab  2 Tab BID Guicho Alan M.D.   2 Tab at 02/01/18 2044    And   • polyethylene glycol/lytes (MIRALAX) PACKET 1 Packet  1 Packet QDAY PRN Guicho Alan M.D.        And   • magnesium hydroxide (MILK OF MAGNESIA) suspension 30 mL  30 mL QDAY PRN Guicho Alan M.D.   30 mL at 01/30/18 1400    And   • bisacodyl (DULCOLAX) suppository 10 mg  10 mg QDAY PRN Guicho Alan M.D.   10 mg at 01/30/18 1738   • Respiratory Care per Protocol   Continuous RT Guicho Alan M.D.       • acetaminophen (TYLENOL) tablet 650 mg  650 mg Q6HRS PRN Guicho Alan M.D.   650 mg at 02/01/18 2044   • labetalol (NORMODYNE,TRANDATE) injection 10 mg  10 mg Q4HRS PRN Guicho Alan M.D.       • ondansetron (ZOFRAN) syringe/vial injection 4 mg  4 mg Q4HRS PRN Guicho Alan M.D.   4 mg at 01/28/18 2228   • ondansetron (ZOFRAN ODT) dispertab 4 mg  4 mg Q4HRS PRN Guicho Alan M.D.       • potassium chloride SA (Kdur) tablet 20 mEq  20 mEq BID Guicho Alan M.D.   20 mEq at 02/02/18 0918     Last reviewed on 1/22/2018  7:21 PM by Tami Alonzo    Quality  Measures:   Labs reviewed, Medications reviewed and Radiology images reviewed   Adam catheter: No Adam       DVT Prophylaxis: Heparin     Ulcer prophylaxis: Yes      Problems:  1) Acute Hypoxemic respiratory failure   -Likely secondary to interstitial lung disease.   2) Interstitial lung disease : Clinical presentation and CT imaging indicates ILD.   - s/p bronchoscopy. Bronchial washing culture No growth  3) Severe pulmonary hypertension.       - Secondary to hypoxia  4) History of hypertension  5) Hypothyroidism  6) Anemia  7) Depression  8) GERD    Plan:   Continue O2 supplementation at rest @ 4l/m. Titrate to maintain O2 > 94%.   -Will need long term  oxygen supplementation q 24 hours upon discharge  Continue prednisone for 4 weeks initially  Follow up pulmonary clinic after 4 weeks  Repeat CT chest on pulmonary followup after 4 weeks  Ideally VATS with Wedge biopsy of lung is indicated but given her DNR status,poor clinical status ,refractory                    hypoxia, the surgery is deferred and prefer to continue empirical treatment with prednisone.  Recommend PFTs, sleep study and cardiology followup as an outpatient.  Recommend to send patient to a SNF instead of her home initially  We will continue to follow.    The management plan was discussed with the patient and RN.

## 2018-02-02 NOTE — PROGRESS NOTES
Report received at bedside, assumed care.  A&O x 4. Pain present, states she will take pain meds with even medications. Satting above 98% on 4 L. No other concerns, complains or distress. Tele box on. Chart reviewed. Bed in lowest position, treaded slipper sock on, and call light within reach

## 2018-02-02 NOTE — PROGRESS NOTES
Report received at the bed side. Pt sleeping. No family at bedside. No signs of distress or SOB. Call light and belongings within reach. Bed alarm in place. Bed in lowest position.

## 2018-02-02 NOTE — CARE PLAN
Problem: Infection  Goal: Will remain free from infection    Intervention: Implement standard precautions and perform hand washing before and after patient contact  Educated patient about hand hygiene. Verbalized understanding and can demonstrate effectively. Will continue to education patient about infection precautions and monitor for signs and symptoms of infection.         Problem: Pain Management  Goal: Pain level will decrease to patient's comfort goal    Intervention: Follow pain managment plan developed in collaboration with patient and Interdisciplinary Team  Listened to patients discussion of pain. Discussed with patient pain goal and management through medications. Supported and educated patient by addressing specific questions regarding diagnosis, risks, benefits of pain management treatment.

## 2018-02-02 NOTE — DISCHARGE PLANNING
CCS spoke to Betys from [facility]  Per Betsy they are not contracted with the patient's insurance. The referral has been forwarded to [facility] SW on floor has been notified.

## 2018-02-03 ENCOUNTER — APPOINTMENT (OUTPATIENT)
Dept: RADIOLOGY | Facility: MEDICAL CENTER | Age: 78
DRG: 196 | End: 2018-02-03
Attending: INTERNAL MEDICINE
Payer: COMMERCIAL

## 2018-02-03 ENCOUNTER — PATIENT OUTREACH (OUTPATIENT)
Dept: HEALTH INFORMATION MANAGEMENT | Facility: OTHER | Age: 78
End: 2018-02-03

## 2018-02-03 VITALS
TEMPERATURE: 97 F | HEIGHT: 62 IN | BODY MASS INDEX: 25.64 KG/M2 | WEIGHT: 139.33 LBS | RESPIRATION RATE: 18 BRPM | OXYGEN SATURATION: 97 % | HEART RATE: 94 BPM | DIASTOLIC BLOOD PRESSURE: 59 MMHG | SYSTOLIC BLOOD PRESSURE: 110 MMHG

## 2018-02-03 PROBLEM — E87.6 HYPOKALEMIA: Status: RESOLVED | Noted: 2018-01-22 | Resolved: 2018-02-03

## 2018-02-03 PROBLEM — J18.9 CAP (COMMUNITY ACQUIRED PNEUMONIA): Status: RESOLVED | Noted: 2018-01-22 | Resolved: 2018-02-03

## 2018-02-03 LAB
ANION GAP SERPL CALC-SCNC: 10 MMOL/L (ref 0–11.9)
BUN SERPL-MCNC: 29 MG/DL (ref 8–22)
CALCIUM SERPL-MCNC: 8.7 MG/DL (ref 8.5–10.5)
CHLORIDE SERPL-SCNC: 99 MMOL/L (ref 96–112)
CO2 SERPL-SCNC: 24 MMOL/L (ref 20–33)
CREAT SERPL-MCNC: 1.13 MG/DL (ref 0.5–1.4)
GLUCOSE SERPL-MCNC: 104 MG/DL (ref 65–99)
MAGNESIUM SERPL-MCNC: 2 MG/DL (ref 1.5–2.5)
POTASSIUM SERPL-SCNC: 4.9 MMOL/L (ref 3.6–5.5)
SODIUM SERPL-SCNC: 133 MMOL/L (ref 135–145)

## 2018-02-03 PROCEDURE — 700102 HCHG RX REV CODE 250 W/ 637 OVERRIDE(OP): Performed by: STUDENT IN AN ORGANIZED HEALTH CARE EDUCATION/TRAINING PROGRAM

## 2018-02-03 PROCEDURE — 700102 HCHG RX REV CODE 250 W/ 637 OVERRIDE(OP): Performed by: HOSPITALIST

## 2018-02-03 PROCEDURE — 83735 ASSAY OF MAGNESIUM: CPT

## 2018-02-03 PROCEDURE — 71045 X-RAY EXAM CHEST 1 VIEW: CPT

## 2018-02-03 PROCEDURE — A9270 NON-COVERED ITEM OR SERVICE: HCPCS | Performed by: STUDENT IN AN ORGANIZED HEALTH CARE EDUCATION/TRAINING PROGRAM

## 2018-02-03 PROCEDURE — 700111 HCHG RX REV CODE 636 W/ 250 OVERRIDE (IP): Performed by: HOSPITALIST

## 2018-02-03 PROCEDURE — A9270 NON-COVERED ITEM OR SERVICE: HCPCS | Performed by: INTERNAL MEDICINE

## 2018-02-03 PROCEDURE — A9270 NON-COVERED ITEM OR SERVICE: HCPCS | Performed by: HOSPITALIST

## 2018-02-03 PROCEDURE — 700102 HCHG RX REV CODE 250 W/ 637 OVERRIDE(OP): Performed by: INTERNAL MEDICINE

## 2018-02-03 PROCEDURE — 700111 HCHG RX REV CODE 636 W/ 250 OVERRIDE (IP): Performed by: INTERNAL MEDICINE

## 2018-02-03 PROCEDURE — 36415 COLL VENOUS BLD VENIPUNCTURE: CPT

## 2018-02-03 PROCEDURE — 80048 BASIC METABOLIC PNL TOTAL CA: CPT

## 2018-02-03 PROCEDURE — 99239 HOSP IP/OBS DSCHRG MGMT >30: CPT | Performed by: INTERNAL MEDICINE

## 2018-02-03 RX ORDER — FUROSEMIDE 20 MG/1
20 TABLET ORAL DAILY
Qty: 60 TAB | Refills: 1 | Status: SHIPPED | OUTPATIENT
Start: 2018-02-04

## 2018-02-03 RX ORDER — POTASSIUM CHLORIDE 20 MEQ/1
20 TABLET, EXTENDED RELEASE ORAL 2 TIMES DAILY
Qty: 30 TAB | Refills: 1 | Status: SHIPPED | OUTPATIENT
Start: 2018-02-03

## 2018-02-03 RX ORDER — PREDNISONE 50 MG/1
TABLET ORAL
Qty: 60 TAB | Refills: 1 | Status: SHIPPED | OUTPATIENT
Start: 2018-02-03 | End: 2020-01-01

## 2018-02-03 RX ORDER — ALBUTEROL SULFATE 90 UG/1
2 AEROSOL, METERED RESPIRATORY (INHALATION) EVERY 6 HOURS PRN
Qty: 8.5 G | Refills: 1 | Status: SHIPPED | OUTPATIENT
Start: 2018-02-03

## 2018-02-03 RX ADMIN — GUAIFENESIN 200 MG: 100 SOLUTION ORAL at 17:30

## 2018-02-03 RX ADMIN — FLUOXETINE HYDROCHLORIDE 20 MG: 20 CAPSULE ORAL at 09:17

## 2018-02-03 RX ADMIN — OXYCODONE HYDROCHLORIDE AND ACETAMINOPHEN 1 TABLET: 5; 325 TABLET ORAL at 09:18

## 2018-02-03 RX ADMIN — OXYCODONE HYDROCHLORIDE AND ACETAMINOPHEN 1 TABLET: 5; 325 TABLET ORAL at 15:36

## 2018-02-03 RX ADMIN — FAMOTIDINE 20 MG: 20 TABLET, FILM COATED ORAL at 09:17

## 2018-02-03 RX ADMIN — HEPARIN SODIUM 5000 UNITS: 5000 INJECTION, SOLUTION INTRAVENOUS; SUBCUTANEOUS at 06:32

## 2018-02-03 RX ADMIN — HEPARIN SODIUM 5000 UNITS: 5000 INJECTION, SOLUTION INTRAVENOUS; SUBCUTANEOUS at 14:58

## 2018-02-03 RX ADMIN — PREDNISONE 50 MG: 50 TABLET ORAL at 09:18

## 2018-02-03 RX ADMIN — POTASSIUM CHLORIDE 20 MEQ: 1500 TABLET, EXTENDED RELEASE ORAL at 09:17

## 2018-02-03 RX ADMIN — FUROSEMIDE 20 MG: 20 TABLET ORAL at 09:17

## 2018-02-03 RX ADMIN — STANDARDIZED SENNA CONCENTRATE AND DOCUSATE SODIUM 2 TABLET: 8.6; 5 TABLET, FILM COATED ORAL at 09:18

## 2018-02-03 RX ADMIN — GUAIFENESIN 200 MG: 100 SOLUTION ORAL at 06:32

## 2018-02-03 RX ADMIN — LEVOTHYROXINE SODIUM 25 MCG: 25 TABLET ORAL at 06:32

## 2018-02-03 RX ADMIN — GUAIFENESIN 200 MG: 100 SOLUTION ORAL at 12:56

## 2018-02-03 RX ADMIN — GUAIFENESIN 200 MG: 100 SOLUTION ORAL at 00:04

## 2018-02-03 ASSESSMENT — PAIN SCALES - GENERAL
PAINLEVEL_OUTOF10: 0
PAINLEVEL_OUTOF10: 6
PAINLEVEL_OUTOF10: 8

## 2018-02-03 NOTE — FACE TO FACE
Face to Face Note  -  Durable Medical Equipment    Tate Villarreal M.D. - NPI: [id number]  I certify that this patient is under my care and that they had a durable medical equipment(DME)face to face encounter by myself that meets the physician DME face-to-face encounter requirements with this patient on:    Date of encounter:   Patient:                    MRN:                       YOB: 2019  Ade Hermosillo  [id number]  1940     The encounter with the patient was in whole, or in part, for the following medical condition, which is the primary reason for durable medical equipment:  COPD    I certify that, based on my findings, the following durable medical equipment is medically necessary:  Oxygen.    HOME O2 Saturation Measurements:(Values must be present for Home Oxygen orders)  Room air sat at rest: 88     With liters of O2: 2, O2 sat at rest with O2: 95  With Liters of O2: 2, O2 sat with amb with O2 : 90  Is the patient mobile?: Yes    My Clinical findings support the need for the above equipment due to:  Hypoxia    Supporting Symptoms: shortness of breath

## 2018-02-03 NOTE — FACE TO FACE
Face to Face Note  -  Durable Medical Equipment    Tate Villarreal M.D. - NPI: [id number]  I certify that this patient is under my care and that they had a durable medical equipment(DME)face to face encounter by myself that meets the physician DME face-to-face encounter requirements with this patient on:    Date of encounter:   Patient:                    MRN:                       YOB: 2019  Ade Hermosillo  [id number]  1940     The encounter with the patient was in whole, or in part, for the following medical condition, which is the primary reason for durable medical equipment:  Other - interstitial lung disease    I certify that, based on my findings, the following durable medical equipment is medically necessary:  Oxygen.    HOME O2 Saturation Measurements:(Values must be present for Home Oxygen orders)  Room air sat at rest: 87     With liters of O2: 1, O2 sat at rest with O2: 89  With Liters of O2: 1, O2 sat with amb with O2 : 88  Is the patient mobile?: Yes    My Clinical findings support the need for the above equipment due to:  Hypoxia    Supporting Symptoms: shortness of breath

## 2018-02-03 NOTE — PROGRESS NOTES
Report received at bedside, assumed care. Pt is resting in bed. A&O x4. Pain at 0/10. Lungs clear to auscultation. No other concerns, complains or distress. Tele box on. Chart reviewed. Bed in lowest position, treaded slipper socks on, and call light within reach.

## 2018-02-03 NOTE — DISCHARGE SUMMARY
CHIEF COMPLAINT ON ADMISSION  Chief Complaint   Patient presents with   • Chest Pressure   • Sent from Urgent Care       CODE STATUS  DNR    HPI & HOSPITAL COURSE  This is a 78 y.o. female here with chest pressure sent from urgent care. Patient was  Admitted to the telemetry floor and initially though to have community acquired pneumonia and was placed on the appropriate IV antibiotics. She had initial improvement in her oxygen requirements but then started to increase and pulmonary was consulted for further evaluation. She had an extensive work up including HIV, FLACA and ESR that were all normal or negative. CT thorax was done which showed diffuse hazy opacities in both lungs along with diffuse mediastinal lymphadenopathy. Her LDH was normal. Patient eventually underwent bronchoscopy which showed mildly edematous appearing mucosa of the R proximal/distal airways along with L proximal areas as well. BAL showed no growth on culture medium and patient pathology was negative for malignant cells. Patient is thought to have interstitial lung disease and was placed on prednisone 40 mg daily with very slow improvement in oxygenation. She was evaluated by PT/OT and they recommended home health services. Patient was set up with home o2 and will need a very gradual prednisone taper.     The patient met 2-midnight criteria for an inpatient stay at the time of discharge.    Therefore, she is discharged in fair and stable condition with close outpatient follow-up.    SPECIFIC OUTPATIENT FOLLOW-UP  -F/U with Dr Winters of pulmonary in 3-4 weeks  -F/U with her PCP in 1-2 weeks    DISCHARGE PROBLEM LIST  Active Problems:    CAP (community acquired pneumonia) POA: Yes    Hypokalemia POA: Yes    Hypertension (Chronic) POA: Yes    Hypothyroid (Chronic) POA: Yes    Fibromyalgia (Chronic) POA: Yes      Overview: Patient states total body pain    Anxiety and depression (Chronic) POA: Yes    Hyperlipidemia with target LDL less than 100  (Chronic) POA: Yes    Normochromic normocytic anemia POA: Yes    Acute respiratory failure with hypoxia (CMS-HCC) POA: Yes  Resolved Problems:    * No resolved hospital problems. *      FOLLOW UP  No future appointments.  [facility]  [address] [phone]          MEDICATIONS ON DISCHARGE   Ade Hermosillo   Home Medication Instructions ELÍAS:[id number]    Printed on:02/03/18 9246   Medication Information                      albuterol 108 (90 Base) MCG/ACT Aero Soln inhalation aerosol  Inhale 2 Puffs by mouth every 6 hours as needed for Shortness of Breath.             amitriptyline (ELAVIL) 150 MG Tab  Take 150 mg by mouth every bedtime.             fluoxetine (PROZAC) 20 MG CAPS  Take 20 mg by mouth every day.               furosemide (LASIX) 20 MG Tab  Take 1 Tab by mouth every day.             guaiFENesin (ROBITUSSIN) 100 MG/5ML Solution  Take 10 mL by mouth every 6 hours.             levothyroxine (SYNTHROID) 25 MCG Tab  Take 25 mcg by mouth Every morning on an empty stomach.             Oxycodone-Acetaminophen (PERCOCET-10)  MG Tab  Take 1-2 Tabs by mouth every four hours as needed for Severe Pain.             potassium chloride SA (KDUR) 20 MEQ Tab CR  Take 1 Tab by mouth 2 Times a Day.             predniSONE (DELTASONE) 50 MG Tab  Take 50 mg daily X 7days, then 40 mg daily X7 days, then 30 mg daily X7 days, then 20 mg daily and maintain until pulmonology appointment.             pregabalin (LYRICA) 150 MG Cap  Take 150 mg by mouth every day.                   DIET  Orders Placed This Encounter   Procedures   • DIET ORDER     Standing Status:   Standing     Number of Occurrences:   1     Order Specific Question:   Diet:     Answer:   Regular [1]       ACTIVITY  As tolerated.  Weight bearing as tolerated      CONSULTATIONS  -Pulmonary    PROCEDURES  BRONCH-  Findings: Upper airway including vocal cords nl                   Trachea to christi - nl appearing mucosa  without lesions or mass, Maryam sharp                   R proximal and distal airways - mildly edematous appearing mucosa without mass/lesion/anatomic variance, secretions: thin, white                   L proximal and distal airways - mildly edematous appearing mucosa without mass/lesion/anatomic variance, secretions: thin white                   Samples - for cultures and cell count    DX-CHEST-PORTABLE (1 VIEW)   Final Result      Improved diffuse interstitial opacities consistent with improving interstitial edema or pneumonia. No lobar consolidation identified.      DX-CHEST-LIMITED (1 VIEW)   Final Result         Ill-defined opacifications in each lung have increased compared to the prior radiograph. This could indicate worsening of pulmonary edema or inflammation.      DX-CHEST-LIMITED (1 VIEW)   Final Result         Findings on chest radiograph appear stable since the prior radiograph.  No new abnormalities are identified.      CT-CHEST,ABDOMEN,PELVIS WITH   Final Result         1.  Hazy and groundglass bilateral opacities suggests underlying edema and/or infiltrates.   2.  Enlarged mediastinal lymph nodes, consider causes of adenopathy with additional workup as clinically appropriate.   3.  Atherosclerosis   4.  Cholelithiasis   5.  Small hiatal hernia   6.  Diverticulosis   7.  Bilateral pulmonary nodules measuring up to 10.5 mm. See nodule follow-up recommendations below.      Low Risk: CT at 3-6 months, then consider CT at 18-24 months      High Risk: CT at 3-6 months, then at 18-24 months      Comments: Use most suspicious nodule as guide to management. Follow-up intervals may vary according to size and risk.      Low Risk - Minimal or absent history of smoking and of other known risk factors.      High Risk - History of smoking or of other known risk factors.      Note: These recommendations do not apply to lung cancer screening, patients with immunosuppression, or patients with known primary cancer.       Fleischner Society 2017 Guidelines for Management of Incidentally Detected Pulmonary Nodules in Adults      DX-CHEST-LIMITED (1 VIEW)   Final Result         1.  Pulmonary edema and/or infiltrates are identified, which are somewhat decreased since the prior exam.      ECHOCARDIOGRAM COMP W/O CONT   Final Result      DX-CHEST-2 VIEWS   Final Result         1. Patchy airspace opacities throughout both lungs, worse than prior, could relate to worsening pulmonary edema or worsening multifocal pneumonia..      DX-CHEST-PORTABLE (1 VIEW)   Final Result         1.  Pulmonary edema and/or infiltrates are identified, which are stable since the prior exam.            LABORATORY  Lab Results   Component Value Date/Time    SODIUM 133 (L) 02/03/2018 02:37 AM    POTASSIUM 4.9 02/03/2018 02:37 AM    CHLORIDE 99 02/03/2018 02:37 AM    CO2 24 02/03/2018 02:37 AM    GLUCOSE 104 (H) 02/03/2018 02:37 AM    BUN 29 (H) 02/03/2018 02:37 AM    CREATININE 1.13 02/03/2018 02:37 AM        Lab Results   Component Value Date/Time    WBC 12.6 (H) 01/30/2018 01:53 AM    HEMOGLOBIN 11.2 (L) 01/30/2018 01:53 AM    HEMATOCRIT 34.5 (L) 01/30/2018 01:53 AM    PLATELETCT 382 01/30/2018 01:53 AM        Total time of the discharge process exceeds 40 minutes

## 2018-02-03 NOTE — PROGRESS NOTES
Report received, pt care assumed. VSS, pt assessment complete. Pt aaox4, no signs of distress noted at this time. POC discussed with pt and verbalizes no questions. Pt c/o of pain in her back, PRN pain med Percocet administered. Pt denies any additional needs at this time. Bed in lowest position, bed alarm on, pt educated on fall risk and verbalized understanding, call light within reach, will continue to monitor.  Patient on 1L oxygen, will continue to monitor this and her oxygen saturation especially when ambulating.

## 2018-02-03 NOTE — PROGRESS NOTES
Pulmonary Progress Note        Chief Complaint:  Inpatient follow up for interstitial lung disease, c/o of exertional dyspnea    History of Present Illness: 77 y.o. female admitted for worsening dyspnea for 2 weeks but increasing symptoms including fatigue for at least 6 months.  Also diffuse chest discomfort and non productive cough. No sx of CVD or known malignancy.  No h/o contact with birds or HIV risks.  No significant weight loss or change in bowel habits.      ROS:  Respiratory: Dyspnea at rest aggravated upon exertion, no hemoptysis. Cardiac: No palpitations, anginal chest pain, dependent edema,, GI: Admits retrosternal burning and symptoms of reflux. No nausea or vomiting or abdominal pain. Musculoskeletal : Severe backache. All other systems negative.    Interval Events:  Bronchoscopy did not reveal any abnormal findings.    01/29/18: S/p bronchoscopy with bronchial washing yesterday. Afebrile. Has non productive cough and dyspnea. No hemoptysis. Patient was being seen by Dr Winters and I have taken over the patient from today.    1/30/18: Oral steroids were started yesterday. Patient admits improvement in breathing. Still develops dyspnea upon minor exertion and requires oxygen supplementation. Afebrile.    1/31/18: Patient continues to improve. Oxygen requirements now down to 4L and satting at 95%.    2/1/18: Patient had a GLF yesterday while attempting ambulation. She feels about the same, but does feel better since admission. Continues to be on 4 L of O2 via [place].    2/2/18: Patient is comfortable while resting in bed but develops dyspnea upon exertion. Also develops hypoxia upon exertion despite on O2 supplementation. Denies pleuritic chest pain, chest tightness or wheezing. No dependent edema. Afebrile    2/3/18: Patient is lying comfortably in bed. Had a good night sleep. No new events overnight. Still has worsening dyspnea upon exertion likely due to pulmonary hypertension.    PFSH:  No  change.    Respiratory:      Pulse oximetery is 97% on O2 6l/min    Patient still c/o non specific sarah upper chest pain on inspiration       Exam: Bilateral end-inspiratory crepitations more in bases. No wheeze.  No pleural rub  Imaging: CXR dated 02/28/18 reviewed shows perihilar prominence bilaterally with diffuse patchy opacity. No consolidation or effusion.     CT reviewed  1.  Hazy and groundglass bilateral opacities suggests underlying edema and/or infiltrates.  2.  Enlarged mediastinal lymph nodes, consider causes of adenopathy with additional workup as clinically appropriate.  3.  Atherosclerosis  4.  Cholelithiasis  5.  Small hiatal hernia  6.  Diverticulosis  7.  Bilateral pulmonary nodules measuring up to 10.5 mm. See nodule follow-up recommendations below    HemoDynamics:  Pulse: 75  Blood Pressure : (!) 97/62       Exam: First and second heart sounds audible. No murmur or pericardial rub  No dependent edema or calf tenderness.     ECHO-  Normal left ventricular chamber size.  Left ventricular ejection fraction is visually estimated to be 60%.  Moderately dilated right ventricle.  Normal right ventricular systolic function.  Moderate tricuspid regurgitation.   Right ventricular systolic pressure is estimated to be 65-70 mmHg        Neuro:  GCS  15    Exam: Tendon jerks intact. No focal neurological deficit.       Fluids:  Intake/Output       02/01/18 0700 - 02/02/18 0659 02/02/18 0700 - 02/03/18 0659 02/03/18 0700 - 02/04/18 0659      [id number] [id number] Total 1528-3983 7321-5970 Total 8797-3173 7968-2574 Total       Intake    P.O.  600  -- 600  630  -- 630  --  -- --    P.O. 600 -- 600 630 -- 630 -- -- --    Total Intake 600 -- 600 630 -- 630 -- -- --       Output    Urine  --  -- --  --  600 600  --  -- --    Number of Times Voided 2 x -- 2 x 2 x 2 x 4 x -- -- --    Void (ml) -- -- -- -- 600 600 -- -- --    Stool  --  -- --  --  -- --  --  -- --    Number of Times Stooled 1 x -- 1 x 1 x 0 x 1 x -- --  --    Total Output -- -- -- -- 600 600 -- -- --       Net I/O     600 -- 600 630 -600 30 -- -- --        Weight: 63.2 kg (139 lb 5.3 oz)  Recent Labs      18   0159  183  18   0237   SODIUM  134*  135  133*   POTASSIUM  4.4  4.4  4.9   CHLORIDE  101  102  99   CO2  25  25  24   BUN  32*  36*  29*   CREATININE  1.04  0.96  1.13   MAGNESIUM  2.0  2.0  2.0   CALCIUM  8.7  8.6  8.7       GI/Nutrition:  Exam:Oral mucosa is moist. No oral thrush.   Abdomen is soft and non tender Bowel sounds audible  Oral feeding   Liver Function  Recent Labs      18   01518   0237   GLUCOSE  97  90  104*         Infectious Disease:  Temp  Av.8 °C (98.3 °F)  Min: 36.3 °C (97.4 °F)  Max: 37.4 °C (99.3 °F)  Micro:  BC no growth in 5 days.  Bronchial washings negative for AFB or fungal elements.  HIV antibodies negative  FLACA: negative        Current Facility-Administered Medications   Medication Dose Frequency Provider Last Rate Last Dose   • predniSONE (DELTASONE) tablet 50 mg  50 mg DAILY Tate Villarreal M.D.       • furosemide (LASIX) tablet 20 mg  20 mg Q DAY Tate Villarreal M.D.   20 mg at 18 1733   • famotidine (PEPCID) tablet 20 mg  20 mg BID Khushboo Moran M.D.   20 mg at 18 2153   • amitriptyline (ELAVIL) tablet 100 mg  100 mg QHS Juan Myles M.D.   100 mg at 18 2153   • heparin injection 5,000 Units  5,000 Units Q8HRS Juan Myles M.D.   5,000 Units at 18 0632   • benzocaine-menthol (CEPACOL) lozenge 1 Lozenge  1 Lozenge Q2HRS SERGIO Myles M.D.   1 Lozenge at 18 0954   • ipratropium-albuterol (DUONEB) nebulizer solution 3 mL  3 mL Q4H PRN (RT) Juan Myles M.D.       • oxycodone-acetaminophen (PERCOCET) 5-325 MG per tablet 1 Tab  1 Tab Q4HRS PRN Juan Myles M.D.   1 Tab at 18 1731   • guaiFENesin (ROBITUSSIN) 100 MG/5ML solution 200 mg  10 mL Q6HRS Juan Myles M.D.   200  mg at 02/03/18 0632   • fluoxetine (PROZAC) capsule 20 mg  20 mg DAILY Guicho Alan M.D.   20 mg at 02/02/18 0917   • levothyroxine (SYNTHROID) tablet 25 mcg  25 mcg AM ES Guicho Alan M.D.   25 mcg at 02/03/18 0632   • pregabalin (LYRICA) capsule 150 mg  150 mg Q EVENING Guicho Alan M.D.   150 mg at 02/02/18 2153   • senna-docusate (PERICOLACE or SENOKOT S) 8.6-50 MG per tablet 2 Tab  2 Tab BID Guicho Alan M.D.   2 Tab at 02/01/18 2044    And   • polyethylene glycol/lytes (MIRALAX) PACKET 1 Packet  1 Packet QDAY PRN Guicho Alan M.D.        And   • magnesium hydroxide (MILK OF MAGNESIA) suspension 30 mL  30 mL QDAY PRN Guicho Alan M.D.   30 mL at 01/30/18 1400    And   • bisacodyl (DULCOLAX) suppository 10 mg  10 mg QDAY PRN Guicho Alan M.D.   10 mg at 01/30/18 1738   • Respiratory Care per Protocol   Continuous RT Guicho Alan M.D.       • acetaminophen (TYLENOL) tablet 650 mg  650 mg Q6HRS PRN Guicho Alan M.D.   650 mg at 02/01/18 2044   • labetalol (NORMODYNE,TRANDATE) injection 10 mg  10 mg Q4HRS PRN Guicho Alan M.D.       • ondansetron (ZOFRAN) syringe/vial injection 4 mg  4 mg Q4HRS PRN Guicho Alan M.D.   4 mg at 01/28/18 2228   • ondansetron (ZOFRAN ODT) dispertab 4 mg  4 mg Q4HRS PRN Guicho Alan M.D.       • potassium chloride SA (Kdur) tablet 20 mEq  20 mEq BID Guicho Alan M.D.   20 mEq at 02/02/18 2152     Last reviewed on 1/22/2018  7:21 PM by Tami Alonzo    Quality  Measures:  Labs reviewed, Medications reviewed and Radiology images reviewed  Adam catheter: No Adam      DVT Prophylaxis: Heparin    Ulcer prophylaxis: Yes      Problems:  1) Acute Hypoxemic respiratory failure improved with oxygen supplementation  2) Interstitial lung disease versus hypersensitivity pneumonitis empirical diagnosis  3) Severe pulmonary hypertension likely seconday to pulmonary disease and hypoxia  4) Hypertension  5) Hypothyroidism  6) Anemia  7) Depression  8) GERD    Plan:    Continue O2 supplementation and Titrate to maintain O2 > 94%  Continue prednisone for 4 weeks initially  Follow up pulmonary clinic after 4 weeks  Repeat CT chest on pulmonary followup after 4 weeks  Ideally VATS with Wedge biopsy of lung is indicated but given her DNR status,  poor clinical status ,refractory hypoxia, the surgery is deferred and prefer to continue empirical treatment with prednisone.  Recommend PFTs, sleep study and cardiology followup as an outpatient.  Recommend to send patient to a SNF instead of her home initially  We will continue to follow.  Repeat CXR today    The management plan was discussed with the patient and RN.

## 2018-02-03 NOTE — CARE PLAN
Problem: Safety  Goal: Will remain free from falls  Outcome: PROGRESSING AS EXPECTED  Reminded pt to use call light when needing assistance to the bathroom. Pt understands and uses call light appropriately.    Problem: Venous Thromboembolism (VTW)/Deep Vein Thrombosis (DVT) Prevention:  Goal: Patient will participate in Venous Thrombosis (VTE)/Deep Vein Thrombosis (DVT)Prevention Measures  Outcome: PROGRESSING AS EXPECTED  Educated pt on indication for heparin injection subQ. Pt understands the need to decrease risk for blood clot formation and agrees with plan of care.    Problem: Knowledge Deficit  Goal: Knowledge of disease process/condition, treatment plan, diagnostic tests, and medications will improve  Outcome: PROGRESSING AS EXPECTED  Educated pt on night time medication indications and mechanism of action. Pt agrees with plan of care with all questions answered.    Problem: Respiratory:  Goal: Respiratory status will improve  Outcome: PROGRESSING AS EXPECTED  Verified placement of continuous pulse ox for accurate titration of oxygen.  Successfully titrated pt's oxygen down to 1.5L NC. Pt tolerating well with an oxygen saturation of 94%.  Encouraged use of incentive spirometer.

## 2018-02-03 NOTE — PROGRESS NOTES
[facility]ist Progress Note    Date of Service: 2018    Chief Complaint  77 y.o. female admitted 2018 with pneumonia and respiratory failure.    Interval Problem Update  Resp failure-no change in o2 requirements. Will increase prednisone dosing. Otherwise feels fine.     Consultants/Specialty  Pulm.     Disposition  Tbd. HH versus SNF?        Review of Systems   Constitutional: Negative for chills, diaphoresis and fever.        Feels a little stronger today   HENT: Negative for tinnitus.    Respiratory: Positive for shortness of breath. Negative for cough, hemoptysis and sputum production.         No changes noted today     Cardiovascular: Negative for palpitations.   Gastrointestinal: Negative for nausea and vomiting.   Genitourinary: Negative for dysuria and urgency.   Musculoskeletal: Negative for myalgias.   Skin: Negative for itching.   Neurological: Positive for weakness (no apparent change). Negative for dizziness.   Psychiatric/Behavioral: Negative for depression.   All other systems reviewed and are negative.     Physical Exam  Laboratory/Imaging   Hemodynamics  Temp (24hrs), Av.7 °C (98 °F), Min:36.2 °C (97.1 °F), Max:37.4 °C (99.3 °F)   Temperature: 36.9 °C (98.4 °F)  Pulse  Av.5  Min: 61  Max: 100    Blood Pressure : (!) 99/67      Respiratory      Respiration: 16, Pulse Oximetry: 99 %     Work Of Breathing / Effort: Mild  RUL Breath Sounds: Coarse Crackles, RML Breath Sounds: Crackles;Coarse Crackles, RLL Breath Sounds: Diminished, ROSSY Breath Sounds: Coarse Crackles, LLL Breath Sounds: Diminished    Fluids    Intake/Output Summary (Last 24 hours) at 18 1718  Last data filed at 18 0900   Gross per 24 hour   Intake              480 ml   Output                0 ml   Net              480 ml       Nutrition  Orders Placed This Encounter   Procedures   • DIET ORDER     Standing Status:   Standing     Number of Occurrences:   1     Order Specific Question:   Diet:     Answer:    Regular [1]     Physical Exam   Constitutional: She is oriented to person, place, and time. No distress.   Standing up, appears somewhat weak   Eyes: Right eye exhibits no discharge. Left eye exhibits no discharge.   Neck: No JVD present.   Cardiovascular: Normal rate, regular rhythm and normal heart sounds.    Pulmonary/Chest: Effort normal. No respiratory distress. She has rales (velcro more prominent at the bases, B/L, unchanged aeration noted today).   No use of accessory resp muscles at this time, remains the same today   Abdominal: Soft. Bowel sounds are normal. She exhibits no distension. There is no tenderness.   Musculoskeletal: Normal range of motion. She exhibits no tenderness.   Neurological: She is oriented to person, place, and time. She exhibits normal muscle tone.   Skin: No erythema.   Psychiatric: She has a normal mood and affect.   Nursing note and vitals reviewed.          Recent Labs      02/01/18   0159  02/02/18   0223   SODIUM  134*  135   POTASSIUM  4.4  4.4   CHLORIDE  101  102   CO2  25  25   GLUCOSE  97  90   BUN  32*  36*   CREATININE  1.04  0.96   CALCIUM  8.7  8.6                      Assessment/Plan     Hypokalemia- (present on admission)   Assessment & Plan    --stable at this time, monitor while on diuretics        CAP (community acquired pneumonia)- (present on admission)   Assessment & Plan    -stopped levaquin already, concerning for ILD/IPF?, see above  -see above  -try to wean o2  -RT protocol, monitor volume status closely        Hypertension- (present on admission)   Assessment & Plan    -blood pressure is still on the low side, no changes planned at this time  --no BP medications, griffin to PO lasix  -continue to hold other outpatient BP meds at this time  -adjust PRN        Acute respiratory failure with hypoxia (CMS-HCC)- (present on admission)   Assessment & Plan    Multifactorial due to pneumonia, pulmonary edema, possible ILD, most likely the latter  -FLACA is  negative  -s/p bronch, BAL's negative thus far, pathology of the fluid is pending at this time  -continue empiric steroids, increase to 50 mg daily, no change in o2  -will need home o2, very slow improvement, still quite hypoxic with mild ambulation,no changes again today  -unclear if she will need wedge bx or not?  -ECHO is ok        Normochromic normocytic anemia- (present on admission)   Assessment & Plan    Monitor H&H if she drops 7 or 21 or becomes unstable transfuse.  Hb stable.         Hyperlipidemia with target LDL less than 100- (present on admission)   Assessment & Plan    Continued low-fat low-cholesterol diet, monitor fasting lipid panel periodically.  F/u as outpatient.          Anxiety and depression- (present on admission)   Assessment & Plan    Patient is on Prozac for her depression, she is also on Elavil at nighttime.  Stable.        Fibromyalgia- (present on admission)   Assessment & Plan    Continue with pain management for her fibromyalgia. Patient remains on Lyrica.        Hypothyroid- (present on admission)   Assessment & Plan    -supportive measures with synthroid supplementation at 25 mcg per day, no change  -latest TSH is 0.730 and this is with in establish normal guidlines   Stable.             Reviewed items::  Labs reviewed and Medications reviewed  DVT prophylaxis - mechanical:  SCDs  Antibiotics:  Treating active infection/contamination beyond 24 hours perioperative coverage

## 2018-02-03 NOTE — CARE PLAN
Problem: Safety  Goal: Will remain free from injury  Pt mobility assessed at the beginning of the shift. Fall precautions in place, non-slip socks on, bed in lowest, locked position, and call light is within reach. Pt educated to call for assistance, and verbalizes understanding.    Problem: Infection  Goal: Will remain free from infection  Pt lab values monitored, VS monitored. No new s/s infection.

## 2018-02-03 NOTE — DISCHARGE PLANNING
Received choice form from [facility].  Referral sent to [facility] [place] at 0560 on 02-03-18

## 2018-02-04 NOTE — PROGRESS NOTES
Discharge instructions given and discussed, signed copy in chart. Pt verbalized understanding and all questions answered. Electronic prescriptions sent. Pt discharged home in stable condition on 2O2 via car escorted by family. Personal belongings w/ patient. IV removed and tolerated well. Tele box removed, monitor room notified.

## 2018-02-04 NOTE — DISCHARGE PLANNING
Spoke with Caitlyn at [facility], they are processing order.  She will have  deliver equipment as soon as possible.

## 2018-02-04 NOTE — DISCHARGE INSTRUCTIONS
Discharge Instructions    Discharged to home by car with relative. Discharged via wheelchair, hospital escort: Refused.  Special equipment needed: Oxygen    Be sure to schedule a follow-up appointment with your primary care doctor or any specialists as instructed.       Discharge Plan:   Diet Plan: Discussed  Activity Level: Discussed  Confirmed Symptoms Management: Discussed  Medication Reconciliation Updated: Yes  Pneumococcal Vaccine Given - only chart on this line when given: Given (See MAR)  Influenza Vaccine Indication: Indicated: 65 years and older  Influenza Vaccine Given - only chart on this line when given: Influenza Vaccine Given (See MAR)    I understand that a diet low in cholesterol, fat, and sodium is recommended for good health. Unless I have been given specific instructions below for another diet, I accept this instruction as my diet prescription.   Other diet: Diet low fat, low sugar, heart healthy with 9-13 servings of fruits and vegetables     Special Instructions: None    · Is patient discharged on Warfarin / Coumadin?   No         Depression / Suicide Risk    As you are discharged from this [facility] facility, it is important to learn how to keep safe from harming yourself.    Recognize the warning signs:  · Abrupt changes in personality, positive or negative- including increase in energy   · Giving away possessions  · Change in eating patterns- significant weight changes-  positive or negative  · Change in sleeping patterns- unable to sleep or sleeping all the time   · Unwillingness or inability to communicate  · Depression  · Unusual sadness, discouragement and loneliness  · Talk of wanting to die  · Neglect of personal appearance   · Rebelliousness- reckless behavior  · Withdrawal from people/activities they love  · Confusion- inability to concentrate     If you or a loved one observes any of these behaviors or has concerns about self-harm, here's what you can do:  · Talk about it- your  feelings and reasons for harming yourself  · Remove any means that you might use to hurt yourself (examples: pills, rope, extension cords, firearm)  · Get professional help from the community (Mental Health, Substance Abuse, psychological counseling)  · Do not be alone:Call your Safe Contact- someone whom you trust who will be there for you.  · Call your local CRISIS HOTLINE 363-8426 or 839-786-1728  · Call your local Children's Mobile Crisis Response Team [place] (099) 089-7723 or wwwC[place]ito  · Call the toll free National Suicide Prevention Hotlines   · National Suicide Prevention Lifeline 820-329-WBYS (6908)  · National Hope Line Network 800-SUICIDE (565-3438)    Oxygen Use at Home  Oxygen can be prescribed for home use. The prescription will show the flow rate. This is how much oxygen is to be used per minute. This will be listed in liters per minute (LPM or L/M). A liter is a metric measurement of volume.  You will use oxygen therapy as directed. It can be used while exercising, sleeping, or at rest. You may need oxygen continuously. Your health care provider may order a blood oxygen test (arterial blood gas or pulse oximetry test) that will show what your oxygen level is. Your health care provider will use these measurements to learn about your needs and follow your progress.  Home oxygen therapy is commonly used on patients with various lung (pulmonary) related conditions. Some of these conditions include:  · Asthma.  · Lung cancer.  · Pneumonia.  · Emphysema.  · Chronic bronchitis.  · Cystic fibrosis.  · Other lung diseases.  · Pulmonary fibrosis.  · Occupational lung disease.  · Heart failure.  · Chronic obstructive pulmonary disease (COPD).  3 COMMON WAYS OF PROVIDING OXYGEN THERAPY  · Gas: The gas form of oxygen is put into variously sized cylinders or tanks. The cylinders or oxygen tanks contain compressed oxygen. The cylinder is equipped with a regulator that controls the flow rate.  Because the flow of oxygen out of the cylinder is constant, an oxygen conserving device may be attached to the system to avoid waste. This device releases the gas only when you inhale and cuts it off when you exhale. Oxygen can be provided in a small cylinder that can be carried with you. Large tanks are heavy and are only for stationary use. After use, empty tanks must be exchanged for full tanks.  · Liquid: The liquid form of oxygen is put into a container similar to a thermos. When released, the liquid converts to a gas and you breathe it in just like the compressed gas. This storage method takes up less space than the compressed gas cylinder, and you can transfer the liquid to a small, portable vessel at home. Liquid oxygen is more expensive than the compressed gas, and the vessel vents when not in use. An oxygen conserving device may be built into the vessel to conserve the oxygen. Liquid oxygen is very cold, around 297° below zero.  · Oxygen concentrator: This medical device filters oxygen from room air and gives almost 100% oxygen to the patient. Oxygen concentrators are powered by electricity. Benefits of this system are:  ¨ It does not need to be resupplied.  ¨ It is not as costly as liquid oxygen.  ¨ Extra tubing permits the user to move around easier.  There are several types of small, portable oxygen systems available which can help you remain active and mobile. You must have a cylinder of oxygen as a backup in the event of a power failure. Advise your electric power company that you are on oxygen therapy in order to get priority service when there is a power failure.  OXYGEN DELIVERY DEVICES  There are 3 common ways to deliver oxygen to your body.  · Nasal cannula. This is a 2-pronged device inserted in the nostrils that is connected to tubing carrying the oxygen. The tubing can rest on the ears or be attached to the frame of eyeglasses.  · Mask. People who need a high flow of oxygen generally use a  "mask.  · Transtracheal catheter. Transtracheal oxygen therapy requires the insertion of a small, flexible tube (catheter) in the windpipe (trachea). This catheter is held in place by a necklace. Since transtracheal oxygen bypasses the mouth, nose, and throat, a humidifier is absolutely required at flow rates of 1 LPM or greater.  OXYGEN USE SAFETY TIPS  · Never smoke while using oxygen. Oxygen does not burn or explode, but flammable materials will burn faster in the presence of oxygen.  · Keep a fire extinguisher close by. Let your fire department know that you have oxygen in your home.  · Warn visitors not to smoke near you when you are using oxygen. Put up \"no smoking\" signs in your home where you most often use the oxygen.  · When you go to a restaurant with your portable oxygen source, ask to be seated in the nonsmoking section.  · Stay at least 5 feet away from gas stoves, candles, lighted fireplaces, or other heat sources.  · Do not use materials that burn easily (flammable) while using your oxygen.  · If you use an oxygen cylinder, make sure it is secured to some fixed object or in a stand. If you use liquid oxygen, make sure the vessel is kept upright to keep the oxygen from pouring out. Liquid oxygen is so cold it can hurt your skin.  · If you use an oxygen concentrator, call your electric company so you will be given priority service if your power goes out. Avoid using extension cords, if possible.  · Regularly test your smoke detectors at home to make sure they work. If you receive care in your home from a nurse or other health care provider, he or she may also check to make sure your smoke detectors work.  GUIDELINES FOR CLEANING YOUR EQUIPMENT  · Wash the nasal prongs with a liquid soap. Thoroughly rinse them once or twice a week.  · Replace the prongs every 2 to 4 weeks. If you have an infection (cold, pneumonia) change them when you are well.  · Your health care provider will give you instructions on " how to clean your transtracheal catheter.  · The humidifier bottle should be washed with soap and warm water and rinsed thoroughly between each refill. Air-dry the bottle before filling it with sterile or distilled water. The bottle and its top should be disinfected after they are cleaned.  · If you use an oxygen concentrator, unplug the unit. Then wipe down the cabinet with a damp cloth and dry it daily. The air filter should be cleaned at least twice a week.  · Follow your home medical equipment and service company's directions for cleaning the compressor filter.  HOME CARE INSTRUCTIONS   · Do not change the flow of oxygen unless directed by your health care provider.  · Do not use alcohol or other sedating drugs unless instructed. They slow your breathing rate.  · Do not use materials that burn easily (flammable) while using your oxygen.  · Always keep a spare tank of oxygen. Plan ahead for holidays when you may not be able to get a prescription filled.  · Use water-based lubricants on your lips or nostrils. Do not use an oil-based product like petroleum jelly.  · To prevent your cheeks or the skin behind your ears from becoming irritated, tuck some gauze under the tubing.  · If you have persistent redness under your nose, call your health care provider.  · When you no longer need oxygen, your doctor will have the oxygen discontinued. Oxygen is not addicting or habit forming.  · Use the oxygen as instructed. Too much oxygen can be harmful and too little will not give you the benefit you need.  · Shortness of breath is not always from a lack of oxygen. If your oxygen level is not the cause of your shortness of breath, taking oxygen will not help.  SEEK MEDICAL CARE IF:   · You have frequent headaches.  · You have shortness of breath or a lasting cough.  · You have anxiety.  · You are confused.  · You are drowsy or sleepy all the time.  · You develop an illness which aggravates your breathing.  · You cannot  exercise.  · You are restless.  · You have blue lips or fingernails.  · You have difficult or irregular breathing and it is getting worse.  · You have a fever.     This information is not intended to replace advice given to you by your health care provider. Make sure you discuss any questions you have with your health care provider.     Document Released: 03/09/2005 Document Revised: 01/08/2016 Document Reviewed: 07/30/2014  [employer] Interactive Patient Education ©2016 [employer].      Respiratory failure is when your lungs are not working well and your breathing (respiratory) system fails. When respiratory failure occurs, it is difficult for your lungs to get enough oxygen, get rid of carbon dioxide, or both. Respiratory failure can be life threatening.   Respiratory failure can be acute or chronic. Acute respiratory failure is sudden, severe, and requires emergency medical treatment. Chronic respiratory failure is less severe, happens over time, and requires ongoing treatment.   WHAT ARE THE CAUSES OF ACUTE RESPIRATORY FAILURE?   Any problem affecting the heart or lungs can cause acute respiratory failure. Some of these causes include the following:  · Chronic bronchitis and emphysema (COPD).    · Blood clot going to a lung (pulmonary embolism).    · Having water in the lungs caused by heart failure, lung injury, or infection (pulmonary edema).    · Collapsed lung (pneumothorax).    · Pneumonia.    · Pulmonary fibrosis.    · Obesity.    · Asthma.    · Heart failure.    · Any type of trauma to the chest that can make breathing difficult.    · Nerve or muscle diseases making chest movements difficult.  WHAT SYMPTOMS SHOULD YOU WATCH FOR?   If you have any of these signs or symptoms, you should seek immediate medical care:   · You have shortness of breath (dyspnea) with or without activity.    · You have rapid, fast breathing (tachypnea).    · You are wheezing.  · You are unable to say more than a few words without  having to catch your breath.  · You find it very difficult to function normally.  · You have a fast heart rate.    · You have a bluish color to your finger or toe nail beds.    · You have confusion or drowsiness or both.    HOW WILL MY ACUTE RESPIRATORY FAILURE BE TREATED?   Treatment of acute respiratory failure depends on the cause of the respiratory failure. Usually, you will stay in the intensive care unit so your breathing can be watched closely. Treatment can include the following:  · Oxygen. Oxygen can be delivered through the following:  ¨ Nasal cannula. This is small tubing that goes in your nose to give you oxygen.  ¨ Face mask. A face mask covers your nose and mouth to give you oxygen.  · Medicine. Different medicines can be given to help with breathing. These can include:  ¨ Nebulizers. Nebulizers deliver medicines to open the air passages (bronchodilators). These medicines help to open or relax the airways in the lungs so you can breathe better. They can also help loosen mucus from your lungs.  ¨ Diuretics. Diuretic medicines can help you breathe better by getting rid of extra water in your body.  ¨ Steroids. Steroid medicines can help decrease swelling (inflammation) in your lungs.  ¨ Antibiotics.  · Chest tube. If you have a collapsed lung (pneumothorax), a chest tube is placed to help reinflate the lung.  · Non-invasive positive pressure ventilation (NPPV). This is a tight-fitting mask that goes over your nose and mouth. The mask has tubing that is attached to a machine. The machine blows air into the tubing, which helps to keep the tiny air sacs (alveoli) in your lungs open. This machine allows you to breathe on your own.  · Ventilator. A ventilator is a breathing machine. When on a ventilator, a breathing tube is put into the lungs. A ventilator is used when you can no longer breathe well enough on your own. You may have low oxygen levels or high carbon dioxide (CO2) levels in your blood. When you  are on a ventilator, sedation and pain medicines are given to make you sleep so your lungs can heal.     This information is not intended to replace advice given to you by your health care provider. Make sure you discuss any questions you have with your health care provider.     Document Released: 12/23/2014 Document Revised: 01/08/2016 Document Reviewed: 12/23/2014  [employer] Interactive Patient Education ©2016 Elsevier Inc.      Pneumonia, Adult  Pneumonia is an infection of the lungs. It may be caused by a germ (virus or bacteria). Some types of pneumonia can spread easily from person to person. This can happen when you cough or sneeze.  HOME CARE  · If you are losing too much sleep, you may use cough medicine. However, you should try to avoid taking cough medicine. This is because coughing helps remove mucus from your lungs.  · Sleep so you are almost sitting up. This may help you cough less.  · Use a vaporizer or humidifier in your home or bedroom. These things can help you breathe better.  · If you were given an antibiotic medicine, finish all of it even if you start to feel better.  · If you were told to take an expectorant medicine, use it as told by your doctor. This medicine brings up mucus from your lungs.  · Only take medicine as told by your doctor.  · Do not smoke. If you smoke, your cough may last weeks after your pneumonia gets better.  · Rest as needed.  A shot (vaccine) can help prevent pneumonia. Shots are often suggested for:  · People over 65 years old.  · People on chemotherapy.  · People with long-term (chronic) lung problems.  · People with immune system problems.  GET HELP IF:  · You have a fever.  · You cannot control your cough with medicine at night, and you are losing sleep.  GET HELP RIGHT AWAY IF:   · You have shortness of breath that gets worse.  · You have more chest pain.  · Your sickness gets worse. This is especially true if you are an older adult or have a weak immune  system.  · You cough up blood.  · Your pain gets worse and medicines do not help.  · Your symptoms get worse and not better.     This information is not intended to replace advice given to you by your health care provider. Make sure you discuss any questions you have with your health care provider.     Document Released: 06/05/2009 Document Revised: 01/08/2016 Document Reviewed: 04/13/2016  [employer] Interactive Patient Education ©2016 [employer] Inc.

## 2018-02-26 LAB
FUNGUS SPEC CULT: ABNORMAL
SIGNIFICANT IND 70042: ABNORMAL
SITE SITE: ABNORMAL
SOURCE SOURCE: ABNORMAL

## 2018-03-25 LAB
MYCOBACTERIUM SPEC CULT: NORMAL
RHODAMINE-AURAMINE STN SPEC: NORMAL
RHODAMINE-AURAMINE STN SPEC: NORMAL
SIGNIFICANT IND 70042: NORMAL
SITE SITE: NORMAL
SOURCE SOURCE: NORMAL

## 2019-03-18 ENCOUNTER — HOSPITAL ENCOUNTER (INPATIENT)
Dept: HOSPITAL 8 - ED | Age: 79
LOS: 5 days | Discharge: HOME HEALTH | DRG: 391 | End: 2019-03-23
Attending: INTERNAL MEDICINE | Admitting: INTERNAL MEDICINE
Payer: MEDICARE

## 2019-03-18 VITALS — HEIGHT: 59 IN | WEIGHT: 140.65 LBS | BODY MASS INDEX: 28.36 KG/M2

## 2019-03-18 VITALS — SYSTOLIC BLOOD PRESSURE: 116 MMHG | DIASTOLIC BLOOD PRESSURE: 58 MMHG

## 2019-03-18 DIAGNOSIS — G25.81: ICD-10-CM

## 2019-03-18 DIAGNOSIS — K80.20: ICD-10-CM

## 2019-03-18 DIAGNOSIS — M54.9: ICD-10-CM

## 2019-03-18 DIAGNOSIS — Z88.1: ICD-10-CM

## 2019-03-18 DIAGNOSIS — M19.90: ICD-10-CM

## 2019-03-18 DIAGNOSIS — F32.9: ICD-10-CM

## 2019-03-18 DIAGNOSIS — Z90.49: ICD-10-CM

## 2019-03-18 DIAGNOSIS — M54.2: ICD-10-CM

## 2019-03-18 DIAGNOSIS — N30.90: ICD-10-CM

## 2019-03-18 DIAGNOSIS — K59.09: ICD-10-CM

## 2019-03-18 DIAGNOSIS — Z88.0: ICD-10-CM

## 2019-03-18 DIAGNOSIS — I10: ICD-10-CM

## 2019-03-18 DIAGNOSIS — E78.5: ICD-10-CM

## 2019-03-18 DIAGNOSIS — E05.90: ICD-10-CM

## 2019-03-18 DIAGNOSIS — G93.40: ICD-10-CM

## 2019-03-18 DIAGNOSIS — E03.9: ICD-10-CM

## 2019-03-18 DIAGNOSIS — Z90.710: ICD-10-CM

## 2019-03-18 DIAGNOSIS — J12.9: ICD-10-CM

## 2019-03-18 DIAGNOSIS — Z60.2: ICD-10-CM

## 2019-03-18 DIAGNOSIS — G89.4: ICD-10-CM

## 2019-03-18 DIAGNOSIS — E87.6: ICD-10-CM

## 2019-03-18 DIAGNOSIS — A09: Primary | ICD-10-CM

## 2019-03-18 DIAGNOSIS — B96.20: ICD-10-CM

## 2019-03-18 LAB
ALBUMIN SERPL-MCNC: 3.1 G/DL (ref 3.4–5)
ALP SERPL-CCNC: 120 U/L (ref 45–117)
ALT SERPL-CCNC: 14 U/L (ref 12–78)
ANION GAP SERPL CALC-SCNC: 6 MMOL/L (ref 5–15)
BASOPHILS # BLD AUTO: 0.03 X10^3/UL (ref 0–0.1)
BASOPHILS # BLD AUTO: 0.05 X10^3/UL (ref 0–0.1)
BASOPHILS NFR BLD AUTO: 0 % (ref 0–1)
BASOPHILS NFR BLD AUTO: 0 % (ref 0–1)
BILIRUB SERPL-MCNC: 0.8 MG/DL (ref 0.2–1)
CALCIUM SERPL-MCNC: 8.9 MG/DL (ref 8.5–10.1)
CHLORIDE SERPL-SCNC: 107 MMOL/L (ref 98–107)
CREAT SERPL-MCNC: 0.97 MG/DL (ref 0.55–1.02)
CULTURE INDICATED?: YES
EOSINOPHIL # BLD AUTO: 0.43 X10^3/UL (ref 0–0.4)
EOSINOPHIL # BLD AUTO: 0.46 X10^3/UL (ref 0–0.4)
EOSINOPHIL NFR BLD AUTO: 3 % (ref 1–7)
EOSINOPHIL NFR BLD AUTO: 3 % (ref 1–7)
ERYTHROCYTE [DISTWIDTH] IN BLOOD BY AUTOMATED COUNT: 12.4 % (ref 9.6–15.2)
ERYTHROCYTE [DISTWIDTH] IN BLOOD BY AUTOMATED COUNT: 13 % (ref 9.6–15.2)
GAMMA GLUTAMYL TRANSPEPTIDASE: 16 U/L (ref 5–55)
LYMPHOCYTES # BLD AUTO: 1.75 X10^3/UL (ref 1–3.4)
LYMPHOCYTES # BLD AUTO: 2.04 X10^3/UL (ref 1–3.4)
LYMPHOCYTES NFR BLD AUTO: 11 % (ref 22–44)
LYMPHOCYTES NFR BLD AUTO: 15 % (ref 22–44)
MCH RBC QN AUTO: 30.7 PG (ref 27–34.8)
MCH RBC QN AUTO: 30.8 PG (ref 27–34.8)
MCHC RBC AUTO-ENTMCNC: 33.6 G/DL (ref 32.4–35.8)
MCHC RBC AUTO-ENTMCNC: 33.8 G/DL (ref 32.4–35.8)
MCV RBC AUTO: 91.2 FL (ref 80–100)
MCV RBC AUTO: 91.3 FL (ref 80–100)
MD: NO
MD: NO
MICROSCOPIC: (no result)
MONOCYTES # BLD AUTO: 0.85 X10^3/UL (ref 0.2–0.8)
MONOCYTES # BLD AUTO: 0.88 X10^3/UL (ref 0.2–0.8)
MONOCYTES NFR BLD AUTO: 6 % (ref 2–9)
MONOCYTES NFR BLD AUTO: 6 % (ref 2–9)
NEUTROPHILS # BLD AUTO: 10.08 X10^3/UL (ref 1.8–6.8)
NEUTROPHILS # BLD AUTO: 12.39 X10^3/UL (ref 1.8–6.8)
NEUTROPHILS NFR BLD AUTO: 75 % (ref 42–75)
NEUTROPHILS NFR BLD AUTO: 80 % (ref 42–75)
PLATELET # BLD AUTO: 246 X10^3/UL (ref 130–400)
PLATELET # BLD AUTO: 269 X10^3/UL (ref 130–400)
PMV BLD AUTO: 7.6 FL (ref 7.4–10.4)
PMV BLD AUTO: 8 FL (ref 7.4–10.4)
PROT SERPL-MCNC: 7.1 G/DL (ref 6.4–8.2)
RBC # BLD AUTO: 3.72 X10^6/UL (ref 3.82–5.3)
RBC # BLD AUTO: 3.97 X10^6/UL (ref 3.82–5.3)
T4 FREE SERPL-MCNC: 2.54 NG/DL (ref 0.76–1.46)
TSH SERPL-ACNC: < 0.005 MIU/L (ref 0.36–3.74)

## 2019-03-18 PROCEDURE — 87186 SC STD MICRODIL/AGAR DIL: CPT

## 2019-03-18 PROCEDURE — 81001 URINALYSIS AUTO W/SCOPE: CPT

## 2019-03-18 PROCEDURE — 71046 X-RAY EXAM CHEST 2 VIEWS: CPT

## 2019-03-18 PROCEDURE — 80053 COMPREHEN METABOLIC PANEL: CPT

## 2019-03-18 PROCEDURE — 83690 ASSAY OF LIPASE: CPT

## 2019-03-18 PROCEDURE — 74177 CT ABD & PELVIS W/CONTRAST: CPT

## 2019-03-18 PROCEDURE — 85379 FIBRIN DEGRADATION QUANT: CPT

## 2019-03-18 PROCEDURE — 82977 ASSAY OF GGT: CPT

## 2019-03-18 PROCEDURE — 83735 ASSAY OF MAGNESIUM: CPT

## 2019-03-18 PROCEDURE — 87086 URINE CULTURE/COLONY COUNT: CPT

## 2019-03-18 PROCEDURE — 82306 VITAMIN D 25 HYDROXY: CPT

## 2019-03-18 PROCEDURE — 84439 ASSAY OF FREE THYROXINE: CPT

## 2019-03-18 PROCEDURE — 82040 ASSAY OF SERUM ALBUMIN: CPT

## 2019-03-18 PROCEDURE — 80048 BASIC METABOLIC PNL TOTAL CA: CPT

## 2019-03-18 PROCEDURE — 96374 THER/PROPH/DIAG INJ IV PUSH: CPT

## 2019-03-18 PROCEDURE — 85025 COMPLETE CBC W/AUTO DIFF WBC: CPT

## 2019-03-18 PROCEDURE — 84443 ASSAY THYROID STIM HORMONE: CPT

## 2019-03-18 PROCEDURE — 90656 IIV3 VACC NO PRSV 0.5 ML IM: CPT

## 2019-03-18 PROCEDURE — 84100 ASSAY OF PHOSPHORUS: CPT

## 2019-03-18 PROCEDURE — 36415 COLL VENOUS BLD VENIPUNCTURE: CPT

## 2019-03-18 PROCEDURE — 76700 US EXAM ABDOM COMPLETE: CPT

## 2019-03-18 PROCEDURE — 83605 ASSAY OF LACTIC ACID: CPT

## 2019-03-18 PROCEDURE — G0378 HOSPITAL OBSERVATION PER HR: HCPCS

## 2019-03-18 PROCEDURE — 87077 CULTURE AEROBIC IDENTIFY: CPT

## 2019-03-18 PROCEDURE — 99285 EMERGENCY DEPT VISIT HI MDM: CPT

## 2019-03-18 PROCEDURE — 70450 CT HEAD/BRAIN W/O DYE: CPT

## 2019-03-18 PROCEDURE — 93005 ELECTROCARDIOGRAM TRACING: CPT

## 2019-03-18 PROCEDURE — 96375 TX/PRO/DX INJ NEW DRUG ADDON: CPT

## 2019-03-18 PROCEDURE — 71275 CT ANGIOGRAPHY CHEST: CPT

## 2019-03-18 PROCEDURE — 87040 BLOOD CULTURE FOR BACTERIA: CPT

## 2019-03-18 PROCEDURE — 82140 ASSAY OF AMMONIA: CPT

## 2019-03-18 RX ADMIN — PREGABALIN SCH MG: 25 CAPSULE ORAL at 21:39

## 2019-03-18 RX ADMIN — CEFTRIAXONE SCH MLS/HR: 2 INJECTION, SOLUTION INTRAVENOUS at 17:30

## 2019-03-18 RX ADMIN — PRAVASTATIN SODIUM SCH MG: 40 TABLET ORAL at 21:39

## 2019-03-18 RX ADMIN — SODIUM CHLORIDE SCH MLS/HR: 0.9 INJECTION, SOLUTION INTRAVENOUS at 18:34

## 2019-03-18 RX ADMIN — HEPARIN SODIUM SCH UNITS: 5000 INJECTION, SOLUTION INTRAVENOUS; SUBCUTANEOUS at 18:34

## 2019-03-18 RX ADMIN — FAMOTIDINE SCH MG: 10 INJECTION INTRAVENOUS at 21:38

## 2019-03-18 RX ADMIN — METRONIDAZOLE SCH MLS/HR: 500 INJECTION, SOLUTION INTRAVENOUS at 18:33

## 2019-03-18 RX ADMIN — CARVEDILOL SCH MG: 12.5 TABLET, FILM COATED ORAL at 21:39

## 2019-03-18 SDOH — SOCIAL STABILITY - SOCIAL INSECURITY: PROBLEMS RELATED TO LIVING ALONE: Z60.2

## 2019-03-18 NOTE — NUR
ROOM 355 RECEIVED, PHONE/VERBAL REPORT TO [facility]MA ZAPIEN. DISCUSSED NEED FOR IV 
FLAGYL S/P ROCEPHIN ANTIBIOTIC COMPLETION. IRMA AWARE, TO ADMIN ONCE ROCEPHIN 
COMPLETED. PER DR. MCKEON PT MAY BE TRANSFERED TO FLOOR PRIOR TO FLAGYL ADMIN 
AS ROCEPHIN STILL INFUSING. PT AWAITING TRANSPORT.

## 2019-03-18 NOTE — NUR
DR. MCKEON AT BEDSIDE FOR RECHECK, DISCUSSING POC WITH PT AND DAUGHTER, 
RECOMMENDING ADMISSION TO HOSPITAL.

## 2019-03-18 NOTE — NUR
ADMITTING PROVIDER AT BEDSIDE FOR EVALUATION. IV ROCEPHINE CONTINUES INFUSING, 
PT IV POSITIONAL TO WRIST MOVEMENT, TOWEL PLACED TO ASSIST PT TO KEEP WRIST/IV 
PATENT. CALL LIGHT IN REACH. VSS. FALL PRECAUTIONS IN PLACE.

## 2019-03-18 NOTE — NUR
IV ANTIBIOTIC INFUSING PER ORDER ON IV PUMP, VERIFIED BLOOD CULTURES DRAWNX2 
PRIOR TO ADMIN. TO ADMIN FLAGYL AFTER ROCEPHIN COMPLETED, DISCUSSED PT PCN 
ALLERGY WITH DR. MCKEON, AWARE, ROCEPHIN ADMIN PER MD ORDER. PT PROVIDED WATER 
PER REQUEST AND DR. MCKEON, TOLATERATING PO WELL. DENIES NEED TO USE RESTROOM. 
CONTINUES TO REFUSE NEED FOR PAIN MEDICATION, TO CALL IF PAIN MEDICATION 
NEEDED. CALL LIGHT IN REACH. VSS.

## 2019-03-18 NOTE — NUR
ASSUMED CARE OF PT AT THIS TIME FROM LOBBY. AMBULATORY TO ROOM WITH STEADY GAIT 
AND TO RESTROOM FOR CLEAN CATCH UA. CLEAN CATCH UA COLLECTED. 80 Y/O F PRESENT 
TO ER FROM  FOR "ABD PAIN STARTING YESTERDAY, IT'S INTENSE, I HAD A BOWEL 
MOVEMENT YESTERDAY BUT NOTHING SINCE AND I'M NOT REALLY PASSING GAS, I'VE HAD 
TROUBLES WITH MY BOWELS SINCE I WAS A CHILD." CONT PULSE OX, BP MONITORS 
APPLIED. VSS. A&OX4. DAUGHTER AT BEDSIDE. CALL LIGHT IN REACH. RATES ABD PAIN 
10/10, BOWEL SOUNDS ACTIVE. ABD SOFT, TENDER TO PALPATION IN ALL QUADRATS. DR. MCKEON AT BEDSIDE FOR EVALUATION. DAUGHTER AT BEDSIDE. FALL PRECUATIONS IN 
PLACE.

## 2019-03-18 NOTE — NUR
LAB AT BEDSIDE FOR BLOOD CULTURES, BLOOD CULTURES TO BE DRAWNX2 PRIOR TO 
ANTIBIOTIC ADMIN PER DR. MCKEON.

## 2019-03-18 NOTE — NUR
PT BACK CT, NAD NOTED. RESP REGULAR AND UNLABORED. FAMILY AT BEDSIDE. VSS. 
DENIES NEED TO USE RESTROOM. PT IN GOOD SPIRITS AND TALKATIVE.

## 2019-03-19 VITALS — DIASTOLIC BLOOD PRESSURE: 51 MMHG | SYSTOLIC BLOOD PRESSURE: 108 MMHG

## 2019-03-19 VITALS — SYSTOLIC BLOOD PRESSURE: 96 MMHG | DIASTOLIC BLOOD PRESSURE: 56 MMHG

## 2019-03-19 VITALS — SYSTOLIC BLOOD PRESSURE: 95 MMHG | DIASTOLIC BLOOD PRESSURE: 45 MMHG

## 2019-03-19 VITALS — SYSTOLIC BLOOD PRESSURE: 104 MMHG | DIASTOLIC BLOOD PRESSURE: 43 MMHG

## 2019-03-19 LAB
ALBUMIN SERPL-MCNC: 2.6 G/DL (ref 3.4–5)
ALP SERPL-CCNC: 103 U/L (ref 45–117)
ALT SERPL-CCNC: 9 U/L (ref 12–78)
ANION GAP SERPL CALC-SCNC: 6 MMOL/L (ref 5–15)
BILIRUB SERPL-MCNC: 0.8 MG/DL (ref 0.2–1)
CALCIUM SERPL-MCNC: 8.4 MG/DL (ref 8.5–10.1)
CHLORIDE SERPL-SCNC: 111 MMOL/L (ref 98–107)
CREAT SERPL-MCNC: 0.83 MG/DL (ref 0.55–1.02)
PROT SERPL-MCNC: 5.9 G/DL (ref 6.4–8.2)

## 2019-03-19 RX ADMIN — PRAVASTATIN SODIUM SCH MG: 40 TABLET ORAL at 21:05

## 2019-03-19 RX ADMIN — HEPARIN SODIUM SCH UNITS: 5000 INJECTION, SOLUTION INTRAVENOUS; SUBCUTANEOUS at 14:22

## 2019-03-19 RX ADMIN — Medication SCH MCG: at 09:40

## 2019-03-19 RX ADMIN — METRONIDAZOLE SCH MLS/HR: 500 INJECTION, SOLUTION INTRAVENOUS at 00:16

## 2019-03-19 RX ADMIN — SODIUM CHLORIDE SCH MLS/HR: 0.9 INJECTION, SOLUTION INTRAVENOUS at 05:00

## 2019-03-19 RX ADMIN — HEPARIN SODIUM SCH UNITS: 5000 INJECTION, SOLUTION INTRAVENOUS; SUBCUTANEOUS at 22:00

## 2019-03-19 RX ADMIN — AMITRIPTYLINE HYDROCHLORIDE SCH MG: 10 TABLET, FILM COATED ORAL at 09:41

## 2019-03-19 RX ADMIN — METRONIDAZOLE SCH MLS/HR: 500 INJECTION, SOLUTION INTRAVENOUS at 06:02

## 2019-03-19 RX ADMIN — CARVEDILOL SCH MG: 12.5 TABLET, FILM COATED ORAL at 09:41

## 2019-03-19 RX ADMIN — PREGABALIN SCH MG: 25 CAPSULE ORAL at 21:05

## 2019-03-19 RX ADMIN — CEFTRIAXONE SCH MLS/HR: 2 INJECTION, SOLUTION INTRAVENOUS at 17:49

## 2019-03-19 RX ADMIN — SODIUM CHLORIDE SCH MLS/HR: 0.9 INJECTION, SOLUTION INTRAVENOUS at 14:22

## 2019-03-19 RX ADMIN — METRONIDAZOLE SCH MLS/HR: 500 INJECTION, SOLUTION INTRAVENOUS at 18:35

## 2019-03-19 RX ADMIN — FLUOXETINE HYDROCHLORIDE SCH MG: 20 CAPSULE ORAL at 09:39

## 2019-03-19 RX ADMIN — METRONIDAZOLE SCH MLS/HR: 500 INJECTION, SOLUTION INTRAVENOUS at 12:48

## 2019-03-19 RX ADMIN — CARVEDILOL SCH MG: 12.5 TABLET, FILM COATED ORAL at 19:17

## 2019-03-19 RX ADMIN — ACETAMINOPHEN PRN MG: 325 TABLET, FILM COATED ORAL at 09:51

## 2019-03-19 RX ADMIN — FAMOTIDINE SCH MG: 10 INJECTION INTRAVENOUS at 21:05

## 2019-03-19 RX ADMIN — FAMOTIDINE SCH MG: 10 INJECTION INTRAVENOUS at 09:39

## 2019-03-19 RX ADMIN — OXCARBAZEPINE SCH MG: 150 TABLET, FILM COATED ORAL at 09:39

## 2019-03-19 RX ADMIN — HEPARIN SODIUM SCH UNITS: 5000 INJECTION, SOLUTION INTRAVENOUS; SUBCUTANEOUS at 06:03

## 2019-03-20 VITALS — DIASTOLIC BLOOD PRESSURE: 58 MMHG | SYSTOLIC BLOOD PRESSURE: 123 MMHG

## 2019-03-20 VITALS — DIASTOLIC BLOOD PRESSURE: 60 MMHG | SYSTOLIC BLOOD PRESSURE: 120 MMHG

## 2019-03-20 VITALS — SYSTOLIC BLOOD PRESSURE: 103 MMHG | DIASTOLIC BLOOD PRESSURE: 53 MMHG

## 2019-03-20 VITALS — DIASTOLIC BLOOD PRESSURE: 64 MMHG | SYSTOLIC BLOOD PRESSURE: 127 MMHG

## 2019-03-20 LAB
ALBUMIN SERPL-MCNC: 2.4 G/DL (ref 3.4–5)
ANION GAP SERPL CALC-SCNC: 5 MMOL/L (ref 5–15)
BASOPHILS # BLD AUTO: 0.02 X10^3/UL (ref 0–0.1)
BASOPHILS NFR BLD AUTO: 0 % (ref 0–1)
CALCIUM SERPL-MCNC: 8.5 MG/DL (ref 8.5–10.1)
CHLORIDE SERPL-SCNC: 114 MMOL/L (ref 98–107)
CREAT SERPL-MCNC: 0.73 MG/DL (ref 0.55–1.02)
EOSINOPHIL # BLD AUTO: 0.42 X10^3/UL (ref 0–0.4)
EOSINOPHIL NFR BLD AUTO: 7 % (ref 1–7)
ERYTHROCYTE [DISTWIDTH] IN BLOOD BY AUTOMATED COUNT: 12.7 % (ref 9.6–15.2)
LYMPHOCYTES # BLD AUTO: 1.75 X10^3/UL (ref 1–3.4)
LYMPHOCYTES NFR BLD AUTO: 28 % (ref 22–44)
MCH RBC QN AUTO: 31 PG (ref 27–34.8)
MCHC RBC AUTO-ENTMCNC: 33.9 G/DL (ref 32.4–35.8)
MCV RBC AUTO: 91.4 FL (ref 80–100)
MD: NO
MONOCYTES # BLD AUTO: 0.6 X10^3/UL (ref 0.2–0.8)
MONOCYTES NFR BLD AUTO: 10 % (ref 2–9)
NEUTROPHILS # BLD AUTO: 3.51 X10^3/UL (ref 1.8–6.8)
NEUTROPHILS NFR BLD AUTO: 56 % (ref 42–75)
PLATELET # BLD AUTO: 224 X10^3/UL (ref 130–400)
PMV BLD AUTO: 7.8 FL (ref 7.4–10.4)
RBC # BLD AUTO: 3.24 X10^6/UL (ref 3.82–5.3)

## 2019-03-20 RX ADMIN — FAMOTIDINE SCH MG: 10 INJECTION INTRAVENOUS at 09:08

## 2019-03-20 RX ADMIN — AMITRIPTYLINE HYDROCHLORIDE SCH MG: 10 TABLET, FILM COATED ORAL at 09:08

## 2019-03-20 RX ADMIN — HEPARIN SODIUM SCH UNITS: 5000 INJECTION, SOLUTION INTRAVENOUS; SUBCUTANEOUS at 21:05

## 2019-03-20 RX ADMIN — HEPARIN SODIUM SCH UNITS: 5000 INJECTION, SOLUTION INTRAVENOUS; SUBCUTANEOUS at 14:00

## 2019-03-20 RX ADMIN — PRAVASTATIN SODIUM SCH MG: 40 TABLET ORAL at 21:05

## 2019-03-20 RX ADMIN — FLUTICASONE PROPIONATE PRN SPRAY: 50 SPRAY, METERED NASAL at 21:39

## 2019-03-20 RX ADMIN — SODIUM CHLORIDE SCH MLS/HR: 0.9 INJECTION, SOLUTION INTRAVENOUS at 15:26

## 2019-03-20 RX ADMIN — METRONIDAZOLE SCH MLS/HR: 500 INJECTION, SOLUTION INTRAVENOUS at 00:05

## 2019-03-20 RX ADMIN — FAMOTIDINE SCH MG: 10 INJECTION INTRAVENOUS at 21:05

## 2019-03-20 RX ADMIN — CEFDINIR SCH MG: 300 CAPSULE ORAL at 09:56

## 2019-03-20 RX ADMIN — SODIUM CHLORIDE SCH MLS/HR: 0.9 INJECTION, SOLUTION INTRAVENOUS at 00:05

## 2019-03-20 RX ADMIN — CARVEDILOL SCH MG: 12.5 TABLET, FILM COATED ORAL at 21:05

## 2019-03-20 RX ADMIN — PREGABALIN SCH MG: 25 CAPSULE ORAL at 21:05

## 2019-03-20 RX ADMIN — FLUOXETINE HYDROCHLORIDE SCH MG: 20 CAPSULE ORAL at 09:08

## 2019-03-20 RX ADMIN — CARVEDILOL SCH MG: 12.5 TABLET, FILM COATED ORAL at 09:07

## 2019-03-20 RX ADMIN — ACETAMINOPHEN PRN MG: 325 TABLET, FILM COATED ORAL at 00:54

## 2019-03-20 RX ADMIN — Medication SCH MCG: at 09:08

## 2019-03-20 RX ADMIN — ACETAMINOPHEN PRN MG: 325 TABLET, FILM COATED ORAL at 21:39

## 2019-03-20 RX ADMIN — OXCARBAZEPINE SCH MG: 150 TABLET, FILM COATED ORAL at 09:07

## 2019-03-20 RX ADMIN — METRONIDAZOLE SCH MLS/HR: 500 INJECTION, SOLUTION INTRAVENOUS at 06:02

## 2019-03-20 RX ADMIN — CEFDINIR SCH MG: 300 CAPSULE ORAL at 21:04

## 2019-03-20 RX ADMIN — HEPARIN SODIUM SCH UNITS: 5000 INJECTION, SOLUTION INTRAVENOUS; SUBCUTANEOUS at 06:02

## 2019-03-21 VITALS — DIASTOLIC BLOOD PRESSURE: 47 MMHG | SYSTOLIC BLOOD PRESSURE: 106 MMHG

## 2019-03-21 VITALS — SYSTOLIC BLOOD PRESSURE: 148 MMHG | DIASTOLIC BLOOD PRESSURE: 82 MMHG

## 2019-03-21 VITALS — DIASTOLIC BLOOD PRESSURE: 71 MMHG | SYSTOLIC BLOOD PRESSURE: 125 MMHG

## 2019-03-21 VITALS — SYSTOLIC BLOOD PRESSURE: 121 MMHG | DIASTOLIC BLOOD PRESSURE: 60 MMHG

## 2019-03-21 LAB
ALBUMIN SERPL-MCNC: 2.8 G/DL (ref 3.4–5)
ANION GAP SERPL CALC-SCNC: 6 MMOL/L (ref 5–15)
BASOPHILS # BLD AUTO: 0.02 X10^3/UL (ref 0–0.1)
BASOPHILS NFR BLD AUTO: 1 % (ref 0–1)
CALCIUM SERPL-MCNC: 8.5 MG/DL (ref 8.5–10.1)
CHLORIDE SERPL-SCNC: 115 MMOL/L (ref 98–107)
CREAT SERPL-MCNC: 0.64 MG/DL (ref 0.55–1.02)
CULTURE INDICATED?: YES
EOSINOPHIL # BLD AUTO: 0.3 X10^3/UL (ref 0–0.4)
EOSINOPHIL NFR BLD AUTO: 6 % (ref 1–7)
ERYTHROCYTE [DISTWIDTH] IN BLOOD BY AUTOMATED COUNT: 12.5 % (ref 9.6–15.2)
LYMPHOCYTES # BLD AUTO: 1.58 X10^3/UL (ref 1–3.4)
LYMPHOCYTES NFR BLD AUTO: 29 % (ref 22–44)
MCH RBC QN AUTO: 30.7 PG (ref 27–34.8)
MCHC RBC AUTO-ENTMCNC: 33.7 G/DL (ref 32.4–35.8)
MCV RBC AUTO: 91 FL (ref 80–100)
MD: NO
MICROSCOPIC: (no result)
MONOCYTES # BLD AUTO: 0.55 X10^3/UL (ref 0.2–0.8)
MONOCYTES NFR BLD AUTO: 10 % (ref 2–9)
NEUTROPHILS # BLD AUTO: 2.96 X10^3/UL (ref 1.8–6.8)
NEUTROPHILS NFR BLD AUTO: 55 % (ref 42–75)
PLATELET # BLD AUTO: 277 X10^3/UL (ref 130–400)
PMV BLD AUTO: 7.6 FL (ref 7.4–10.4)
RBC # BLD AUTO: 3.7 X10^6/UL (ref 3.82–5.3)
T4 FREE SERPL-MCNC: 1.97 NG/DL (ref 0.76–1.46)
TSH SERPL-ACNC: < 0.005 MIU/L (ref 0.36–3.74)

## 2019-03-21 RX ADMIN — SODIUM CHLORIDE SCH MLS/HR: 0.9 INJECTION, SOLUTION INTRAVENOUS at 21:01

## 2019-03-21 RX ADMIN — Medication SCH MCG: at 09:05

## 2019-03-21 RX ADMIN — HEPARIN SODIUM SCH UNITS: 5000 INJECTION, SOLUTION INTRAVENOUS; SUBCUTANEOUS at 21:01

## 2019-03-21 RX ADMIN — FAMOTIDINE SCH MG: 10 INJECTION INTRAVENOUS at 09:05

## 2019-03-21 RX ADMIN — FAMOTIDINE SCH MG: 10 INJECTION INTRAVENOUS at 21:01

## 2019-03-21 RX ADMIN — DOXYCYCLINE HYCLATE SCH MG: 100 TABLET, FILM COATED ORAL at 09:11

## 2019-03-21 RX ADMIN — FLUOXETINE HYDROCHLORIDE SCH MG: 20 CAPSULE ORAL at 09:05

## 2019-03-21 RX ADMIN — SODIUM CHLORIDE SCH MLS/HR: 0.9 INJECTION, SOLUTION INTRAVENOUS at 09:08

## 2019-03-21 RX ADMIN — SODIUM CHLORIDE SCH MLS/HR: 0.9 INJECTION, SOLUTION INTRAVENOUS at 00:03

## 2019-03-21 RX ADMIN — CEFDINIR SCH MG: 300 CAPSULE ORAL at 09:05

## 2019-03-21 RX ADMIN — CARVEDILOL SCH MG: 12.5 TABLET, FILM COATED ORAL at 09:05

## 2019-03-21 RX ADMIN — CARVEDILOL SCH MG: 12.5 TABLET, FILM COATED ORAL at 21:01

## 2019-03-21 RX ADMIN — AMITRIPTYLINE HYDROCHLORIDE SCH MG: 10 TABLET, FILM COATED ORAL at 09:05

## 2019-03-21 RX ADMIN — CEFDINIR SCH MG: 300 CAPSULE ORAL at 21:01

## 2019-03-21 RX ADMIN — OXCARBAZEPINE SCH MG: 150 TABLET, FILM COATED ORAL at 09:05

## 2019-03-21 RX ADMIN — DOXYCYCLINE HYCLATE SCH MG: 100 TABLET, FILM COATED ORAL at 21:01

## 2019-03-21 RX ADMIN — PREGABALIN SCH MG: 25 CAPSULE ORAL at 21:01

## 2019-03-21 RX ADMIN — HEPARIN SODIUM SCH UNITS: 5000 INJECTION, SOLUTION INTRAVENOUS; SUBCUTANEOUS at 13:06

## 2019-03-21 RX ADMIN — HEPARIN SODIUM SCH UNITS: 5000 INJECTION, SOLUTION INTRAVENOUS; SUBCUTANEOUS at 05:04

## 2019-03-21 RX ADMIN — PRAVASTATIN SODIUM SCH MG: 40 TABLET ORAL at 21:01

## 2019-03-22 VITALS — DIASTOLIC BLOOD PRESSURE: 61 MMHG | SYSTOLIC BLOOD PRESSURE: 129 MMHG

## 2019-03-22 VITALS — DIASTOLIC BLOOD PRESSURE: 60 MMHG | SYSTOLIC BLOOD PRESSURE: 99 MMHG

## 2019-03-22 VITALS — DIASTOLIC BLOOD PRESSURE: 77 MMHG | SYSTOLIC BLOOD PRESSURE: 141 MMHG

## 2019-03-22 VITALS — SYSTOLIC BLOOD PRESSURE: 140 MMHG | DIASTOLIC BLOOD PRESSURE: 48 MMHG

## 2019-03-22 LAB
ALBUMIN SERPL-MCNC: 2.7 G/DL (ref 3.4–5)
ALP SERPL-CCNC: 95 U/L (ref 45–117)
ALT SERPL-CCNC: 13 U/L (ref 12–78)
ANION GAP SERPL CALC-SCNC: 7 MMOL/L (ref 5–15)
BASOPHILS # BLD AUTO: 0.02 X10^3/UL (ref 0–0.1)
BASOPHILS NFR BLD AUTO: 0 % (ref 0–1)
BILIRUB SERPL-MCNC: 0.4 MG/DL (ref 0.2–1)
CALCIUM SERPL-MCNC: 8.5 MG/DL (ref 8.5–10.1)
CHLORIDE SERPL-SCNC: 111 MMOL/L (ref 98–107)
CREAT SERPL-MCNC: 0.6 MG/DL (ref 0.55–1.02)
EOSINOPHIL # BLD AUTO: 0.29 X10^3/UL (ref 0–0.4)
EOSINOPHIL NFR BLD AUTO: 6 % (ref 1–7)
ERYTHROCYTE [DISTWIDTH] IN BLOOD BY AUTOMATED COUNT: 13.1 % (ref 9.6–15.2)
LYMPHOCYTES # BLD AUTO: 1.68 X10^3/UL (ref 1–3.4)
LYMPHOCYTES NFR BLD AUTO: 34 % (ref 22–44)
MCH RBC QN AUTO: 30.2 PG (ref 27–34.8)
MCHC RBC AUTO-ENTMCNC: 33.2 G/DL (ref 32.4–35.8)
MCV RBC AUTO: 90.9 FL (ref 80–100)
MD: NO
MONOCYTES # BLD AUTO: 0.6 X10^3/UL (ref 0.2–0.8)
MONOCYTES NFR BLD AUTO: 12 % (ref 2–9)
NEUTROPHILS # BLD AUTO: 2.29 X10^3/UL (ref 1.8–6.8)
NEUTROPHILS NFR BLD AUTO: 47 % (ref 42–75)
PLATELET # BLD AUTO: 274 X10^3/UL (ref 130–400)
PMV BLD AUTO: 7.8 FL (ref 7.4–10.4)
PROT SERPL-MCNC: 6 G/DL (ref 6.4–8.2)
RBC # BLD AUTO: 3.53 X10^6/UL (ref 3.82–5.3)

## 2019-03-22 RX ADMIN — FAMOTIDINE SCH MG: 10 INJECTION INTRAVENOUS at 07:38

## 2019-03-22 RX ADMIN — FAMOTIDINE SCH MG: 10 INJECTION INTRAVENOUS at 22:50

## 2019-03-22 RX ADMIN — SODIUM CHLORIDE SCH MLS/HR: 0.9 INJECTION, SOLUTION INTRAVENOUS at 17:32

## 2019-03-22 RX ADMIN — PRAVASTATIN SODIUM SCH MG: 40 TABLET ORAL at 22:50

## 2019-03-22 RX ADMIN — HEPARIN SODIUM SCH UNITS: 5000 INJECTION, SOLUTION INTRAVENOUS; SUBCUTANEOUS at 05:03

## 2019-03-22 RX ADMIN — CARVEDILOL SCH MG: 12.5 TABLET, FILM COATED ORAL at 07:38

## 2019-03-22 RX ADMIN — HEPARIN SODIUM SCH UNITS: 5000 INJECTION, SOLUTION INTRAVENOUS; SUBCUTANEOUS at 14:09

## 2019-03-22 RX ADMIN — CARVEDILOL SCH MG: 12.5 TABLET, FILM COATED ORAL at 22:50

## 2019-03-22 RX ADMIN — PREGABALIN SCH MG: 25 CAPSULE ORAL at 22:49

## 2019-03-22 RX ADMIN — OXCARBAZEPINE SCH MG: 150 TABLET, FILM COATED ORAL at 07:38

## 2019-03-22 RX ADMIN — AMITRIPTYLINE HYDROCHLORIDE SCH MG: 10 TABLET, FILM COATED ORAL at 07:38

## 2019-03-22 RX ADMIN — CEFDINIR SCH MG: 300 CAPSULE ORAL at 22:49

## 2019-03-22 RX ADMIN — FLUTICASONE PROPIONATE PRN SPRAY: 50 SPRAY, METERED NASAL at 22:54

## 2019-03-22 RX ADMIN — CEFDINIR SCH MG: 300 CAPSULE ORAL at 07:39

## 2019-03-22 RX ADMIN — SODIUM CHLORIDE SCH MLS/HR: 0.9 INJECTION, SOLUTION INTRAVENOUS at 07:37

## 2019-03-22 RX ADMIN — DOXYCYCLINE HYCLATE SCH MG: 100 TABLET, FILM COATED ORAL at 07:38

## 2019-03-22 RX ADMIN — HEPARIN SODIUM SCH UNITS: 5000 INJECTION, SOLUTION INTRAVENOUS; SUBCUTANEOUS at 22:49

## 2019-03-22 RX ADMIN — DOXYCYCLINE HYCLATE SCH MG: 100 TABLET, FILM COATED ORAL at 22:50

## 2019-03-22 RX ADMIN — FLUOXETINE HYDROCHLORIDE SCH MG: 20 CAPSULE ORAL at 07:55

## 2019-03-22 RX ADMIN — Medication SCH MCG: at 07:38

## 2019-03-23 VITALS — SYSTOLIC BLOOD PRESSURE: 116 MMHG | DIASTOLIC BLOOD PRESSURE: 48 MMHG

## 2019-03-23 VITALS — DIASTOLIC BLOOD PRESSURE: 70 MMHG | SYSTOLIC BLOOD PRESSURE: 135 MMHG

## 2019-03-23 RX ADMIN — DOXYCYCLINE HYCLATE SCH MG: 100 TABLET, FILM COATED ORAL at 09:52

## 2019-03-23 RX ADMIN — CARVEDILOL SCH MG: 12.5 TABLET, FILM COATED ORAL at 09:52

## 2019-03-23 RX ADMIN — CEFDINIR SCH MG: 300 CAPSULE ORAL at 09:50

## 2019-03-23 RX ADMIN — FLUOXETINE HYDROCHLORIDE SCH MG: 20 CAPSULE ORAL at 09:50

## 2019-03-23 RX ADMIN — Medication SCH MCG: at 09:52

## 2019-03-23 RX ADMIN — SODIUM CHLORIDE SCH MLS/HR: 0.9 INJECTION, SOLUTION INTRAVENOUS at 04:56

## 2019-03-23 RX ADMIN — OXCARBAZEPINE SCH MG: 150 TABLET, FILM COATED ORAL at 09:52

## 2019-03-23 RX ADMIN — FAMOTIDINE SCH MG: 10 INJECTION INTRAVENOUS at 09:48

## 2019-03-23 RX ADMIN — AMITRIPTYLINE HYDROCHLORIDE SCH MG: 10 TABLET, FILM COATED ORAL at 09:52

## 2019-03-23 RX ADMIN — HEPARIN SODIUM SCH UNITS: 5000 INJECTION, SOLUTION INTRAVENOUS; SUBCUTANEOUS at 05:00

## 2019-07-10 ENCOUNTER — HOSPITAL ENCOUNTER (INPATIENT)
Dept: HOSPITAL 8 - ED | Age: 79
LOS: 2 days | Discharge: HOME | DRG: 682 | End: 2019-07-12
Attending: HOSPITALIST | Admitting: HOSPITALIST
Payer: MEDICARE

## 2019-07-10 VITALS — BODY MASS INDEX: 25.6 KG/M2 | HEIGHT: 62 IN | WEIGHT: 139.11 LBS

## 2019-07-10 VITALS — SYSTOLIC BLOOD PRESSURE: 127 MMHG | DIASTOLIC BLOOD PRESSURE: 68 MMHG

## 2019-07-10 VITALS — DIASTOLIC BLOOD PRESSURE: 71 MMHG | SYSTOLIC BLOOD PRESSURE: 111 MMHG

## 2019-07-10 DIAGNOSIS — N95.1: ICD-10-CM

## 2019-07-10 DIAGNOSIS — Z66: ICD-10-CM

## 2019-07-10 DIAGNOSIS — E03.9: ICD-10-CM

## 2019-07-10 DIAGNOSIS — G25.81: ICD-10-CM

## 2019-07-10 DIAGNOSIS — D64.9: ICD-10-CM

## 2019-07-10 DIAGNOSIS — Z91.018: ICD-10-CM

## 2019-07-10 DIAGNOSIS — J15.9: ICD-10-CM

## 2019-07-10 DIAGNOSIS — E78.5: ICD-10-CM

## 2019-07-10 DIAGNOSIS — N17.0: Primary | ICD-10-CM

## 2019-07-10 DIAGNOSIS — Z90.710: ICD-10-CM

## 2019-07-10 DIAGNOSIS — Z88.0: ICD-10-CM

## 2019-07-10 DIAGNOSIS — I11.9: ICD-10-CM

## 2019-07-10 DIAGNOSIS — F13.20: ICD-10-CM

## 2019-07-10 LAB
ALBUMIN SERPL-MCNC: 3.2 G/DL (ref 3.4–5)
ALP SERPL-CCNC: 131 U/L (ref 45–117)
ALT SERPL-CCNC: 17 U/L (ref 12–78)
ANION GAP SERPL CALC-SCNC: 8 MMOL/L (ref 5–15)
BASOPHILS # BLD AUTO: 0.02 X10^3/UL (ref 0–0.1)
BASOPHILS NFR BLD AUTO: 0 % (ref 0–1)
BILIRUB SERPL-MCNC: 0.3 MG/DL (ref 0.2–1)
CALCIUM SERPL-MCNC: 8.7 MG/DL (ref 8.5–10.1)
CHLORIDE SERPL-SCNC: 99 MMOL/L (ref 98–107)
CREAT SERPL-MCNC: 1.14 MG/DL (ref 0.55–1.02)
EOSINOPHIL # BLD AUTO: 0.65 X10^3/UL (ref 0–0.4)
EOSINOPHIL NFR BLD AUTO: 8 % (ref 1–7)
ERYTHROCYTE [DISTWIDTH] IN BLOOD BY AUTOMATED COUNT: 13.5 % (ref 9.6–15.2)
LYMPHOCYTES # BLD AUTO: 1.73 X10^3/UL (ref 1–3.4)
LYMPHOCYTES NFR BLD AUTO: 21 % (ref 22–44)
MCH RBC QN AUTO: 28.8 PG (ref 27–34.8)
MCHC RBC AUTO-ENTMCNC: 32.5 G/DL (ref 32.4–35.8)
MCV RBC AUTO: 88.7 FL (ref 80–100)
MD: NO
MONOCYTES # BLD AUTO: 0.95 X10^3/UL (ref 0.2–0.8)
MONOCYTES NFR BLD AUTO: 11 % (ref 2–9)
NEUTROPHILS # BLD AUTO: 5.02 X10^3/UL (ref 1.8–6.8)
NEUTROPHILS NFR BLD AUTO: 60 % (ref 42–75)
PLATELET # BLD AUTO: 404 X10^3/UL (ref 130–400)
PMV BLD AUTO: 6.2 FL (ref 7.4–10.4)
PROT SERPL-MCNC: 7.7 G/DL (ref 6.4–8.2)
RBC # BLD AUTO: 4.04 X10^6/UL (ref 3.82–5.3)
TROPONIN I SERPL-MCNC: < 0.015 NG/ML (ref 0–0.04)

## 2019-07-10 PROCEDURE — 96375 TX/PRO/DX INJ NEW DRUG ADDON: CPT

## 2019-07-10 PROCEDURE — 36415 COLL VENOUS BLD VENIPUNCTURE: CPT

## 2019-07-10 PROCEDURE — 80053 COMPREHEN METABOLIC PANEL: CPT

## 2019-07-10 PROCEDURE — 93306 TTE W/DOPPLER COMPLETE: CPT

## 2019-07-10 PROCEDURE — 84484 ASSAY OF TROPONIN QUANT: CPT

## 2019-07-10 PROCEDURE — C9113 INJ PANTOPRAZOLE SODIUM, VIA: HCPCS

## 2019-07-10 PROCEDURE — 84443 ASSAY THYROID STIM HORMONE: CPT

## 2019-07-10 PROCEDURE — 80048 BASIC METABOLIC PNL TOTAL CA: CPT

## 2019-07-10 PROCEDURE — 83605 ASSAY OF LACTIC ACID: CPT

## 2019-07-10 PROCEDURE — 87086 URINE CULTURE/COLONY COUNT: CPT

## 2019-07-10 PROCEDURE — 85025 COMPLETE CBC W/AUTO DIFF WBC: CPT

## 2019-07-10 PROCEDURE — 81001 URINALYSIS AUTO W/SCOPE: CPT

## 2019-07-10 PROCEDURE — 71045 X-RAY EXAM CHEST 1 VIEW: CPT

## 2019-07-10 PROCEDURE — 87040 BLOOD CULTURE FOR BACTERIA: CPT

## 2019-07-10 PROCEDURE — 96365 THER/PROPH/DIAG IV INF INIT: CPT

## 2019-07-10 PROCEDURE — 93005 ELECTROCARDIOGRAM TRACING: CPT

## 2019-07-10 RX ADMIN — CARVEDILOL SCH MG: 12.5 TABLET, FILM COATED ORAL at 20:02

## 2019-07-10 RX ADMIN — DEXTROSE, SODIUM CHLORIDE, AND POTASSIUM CHLORIDE SCH MLS/HR: 5; .45; .15 INJECTION INTRAVENOUS at 17:03

## 2019-07-10 NOTE — NUR
PATIENT STATES SHE IS UNABLE TO GET AHOLD OF DAUGHTER FOR UPDATED MED LIST. 
PATIENT SITTING COMFORTABLY, RESTING IN GURNEY, A+OX4.

## 2019-07-10 NOTE — NUR
IV ESTABLISHED, VS UDATED IN CHART, BLOOD CULTURES DRAWN X 2 PRIOR TO ABX 
ADMINISTRATION. PAT[facility] S[facility]LY [facility]. AWAITING FURTHER 
ORDERS.

## 2019-07-10 NOTE — NUR
TASK RN:

THIS IS A 78 Y/O FEMALE THAT ARRIVES TO ED WITH C/O CHEST PAIN AND SOB. PT 
REPROTS HER COUGHING SPELLS ARE MAKING HER DIZZY. PT DOES REPORT THAT SHE HAS 
BEEN NOT FEELING WELL FOR A FEW DAYS. PT REPORTS GETTING WORSE AND UNABLE TO 
IMPROVE HER CONDITION. PT CONNECTED TO MONITORS AND CALL LIGHT IN REACH. 
AWAITING FURTHER ORDERS.

## 2019-07-10 NOTE — NUR
PATIENT POOR HISTORIAN WITH MEDICATIONS, UNABLE TO COMPLETE MED REC. PATIENT TO 
CALL DAUGHTER FOR UP TO DATE LIST.

## 2019-07-10 NOTE — NUR
REPORT FROM [facility]. ASSUMED CARE OF PATIENT AT THIS TIME. [facility] 
PLACED OUTSIDE FOR HX OF MRSA. NADN. AWAITING XRAY AND LAB.

## 2019-07-11 VITALS — DIASTOLIC BLOOD PRESSURE: 77 MMHG | SYSTOLIC BLOOD PRESSURE: 138 MMHG

## 2019-07-11 VITALS — SYSTOLIC BLOOD PRESSURE: 117 MMHG | DIASTOLIC BLOOD PRESSURE: 71 MMHG

## 2019-07-11 VITALS — SYSTOLIC BLOOD PRESSURE: 128 MMHG | DIASTOLIC BLOOD PRESSURE: 65 MMHG

## 2019-07-11 VITALS — DIASTOLIC BLOOD PRESSURE: 67 MMHG | SYSTOLIC BLOOD PRESSURE: 118 MMHG

## 2019-07-11 LAB
ANION GAP SERPL CALC-SCNC: 5 MMOL/L (ref 5–15)
BASOPHILS # BLD AUTO: 0.02 X10^3/UL (ref 0–0.1)
BASOPHILS NFR BLD AUTO: 0 % (ref 0–1)
CALCIUM SERPL-MCNC: 8.7 MG/DL (ref 8.5–10.1)
CHLORIDE SERPL-SCNC: 103 MMOL/L (ref 98–107)
CREAT SERPL-MCNC: 1.01 MG/DL (ref 0.55–1.02)
CULTURE INDICATED?: YES
EOSINOPHIL # BLD AUTO: 0.62 X10^3/UL (ref 0–0.4)
EOSINOPHIL NFR BLD AUTO: 10 % (ref 1–7)
ERYTHROCYTE [DISTWIDTH] IN BLOOD BY AUTOMATED COUNT: 13.7 % (ref 9.6–15.2)
LYMPHOCYTES # BLD AUTO: 1.39 X10^3/UL (ref 1–3.4)
LYMPHOCYTES NFR BLD AUTO: 22 % (ref 22–44)
MCH RBC QN AUTO: 29.1 PG (ref 27–34.8)
MCHC RBC AUTO-ENTMCNC: 32.6 G/DL (ref 32.4–35.8)
MCV RBC AUTO: 89.4 FL (ref 80–100)
MD: NO
MICROSCOPIC: (no result)
MONOCYTES # BLD AUTO: 0.75 X10^3/UL (ref 0.2–0.8)
MONOCYTES NFR BLD AUTO: 12 % (ref 2–9)
NEUTROPHILS # BLD AUTO: 3.47 X10^3/UL (ref 1.8–6.8)
NEUTROPHILS NFR BLD AUTO: 56 % (ref 42–75)
PLATELET # BLD AUTO: 366 X10^3/UL (ref 130–400)
PMV BLD AUTO: 6.4 FL (ref 7.4–10.4)
RBC # BLD AUTO: 3.87 X10^6/UL (ref 3.82–5.3)
TROPONIN I SERPL-MCNC: < 0.015 NG/ML (ref 0–0.04)

## 2019-07-11 RX ADMIN — LEVOTHYROXINE SODIUM SCH MCG: 75 TABLET ORAL at 05:03

## 2019-07-11 RX ADMIN — CARVEDILOL SCH MG: 12.5 TABLET, FILM COATED ORAL at 08:10

## 2019-07-11 RX ADMIN — PREGABALIN SCH MG: 25 CAPSULE ORAL at 21:47

## 2019-07-11 RX ADMIN — ATORVASTATIN CALCIUM SCH MG: 20 TABLET, FILM COATED ORAL at 08:10

## 2019-07-11 RX ADMIN — AMITRIPTYLINE HYDROCHLORIDE SCH MG: 10 TABLET, FILM COATED ORAL at 08:10

## 2019-07-11 RX ADMIN — FLUOXETINE HYDROCHLORIDE SCH MG: 20 CAPSULE ORAL at 08:09

## 2019-07-11 RX ADMIN — OXCARBAZEPINE SCH MG: 150 TABLET, FILM COATED ORAL at 08:09

## 2019-07-11 RX ADMIN — DEXTROSE, SODIUM CHLORIDE, AND POTASSIUM CHLORIDE SCH MLS/HR: 5; .45; .15 INJECTION INTRAVENOUS at 05:03

## 2019-07-11 RX ADMIN — Medication SCH CAP: at 08:10

## 2019-07-11 RX ADMIN — GUAIFENESIN AND DEXTROMETHORPHAN PRN ML: 100; 10 SYRUP ORAL at 22:02

## 2019-07-11 RX ADMIN — LISINOPRIL SCH MG: 5 TABLET ORAL at 08:09

## 2019-07-11 RX ADMIN — DEXTROSE, SODIUM CHLORIDE, AND POTASSIUM CHLORIDE SCH MLS/HR: 5; .45; .15 INJECTION INTRAVENOUS at 21:47

## 2019-07-11 RX ADMIN — CARVEDILOL SCH MG: 12.5 TABLET, FILM COATED ORAL at 21:47

## 2019-07-11 RX ADMIN — AMLODIPINE BESYLATE SCH MG: 5 TABLET ORAL at 08:09

## 2019-07-11 RX ADMIN — AZITHROMYCIN FOR INJECTION INJECTION, POWDER, LYOPHILIZED, FOR SOLUTION SCH MLS/HR: 500 INJECTION INTRAVENOUS at 16:46

## 2019-07-11 RX ADMIN — CEFTRIAXONE SCH MLS/HR: 1 INJECTION, SOLUTION INTRAVENOUS at 15:52

## 2019-07-12 VITALS — SYSTOLIC BLOOD PRESSURE: 115 MMHG | DIASTOLIC BLOOD PRESSURE: 63 MMHG

## 2019-07-12 VITALS — SYSTOLIC BLOOD PRESSURE: 111 MMHG | DIASTOLIC BLOOD PRESSURE: 67 MMHG

## 2019-07-12 VITALS — DIASTOLIC BLOOD PRESSURE: 65 MMHG | SYSTOLIC BLOOD PRESSURE: 114 MMHG

## 2019-07-12 RX ADMIN — AMLODIPINE BESYLATE SCH MG: 5 TABLET ORAL at 09:22

## 2019-07-12 RX ADMIN — GUAIFENESIN AND DEXTROMETHORPHAN PRN ML: 100; 10 SYRUP ORAL at 12:51

## 2019-07-12 RX ADMIN — PREGABALIN SCH MG: 25 CAPSULE ORAL at 09:23

## 2019-07-12 RX ADMIN — LISINOPRIL SCH MG: 5 TABLET ORAL at 09:22

## 2019-07-12 RX ADMIN — CEFTRIAXONE SCH MLS/HR: 1 INJECTION, SOLUTION INTRAVENOUS at 14:58

## 2019-07-12 RX ADMIN — ATORVASTATIN CALCIUM SCH MG: 20 TABLET, FILM COATED ORAL at 09:22

## 2019-07-12 RX ADMIN — LEVOTHYROXINE SODIUM SCH MCG: 75 TABLET ORAL at 05:48

## 2019-07-12 RX ADMIN — AZITHROMYCIN FOR INJECTION INJECTION, POWDER, LYOPHILIZED, FOR SOLUTION SCH MLS/HR: 500 INJECTION INTRAVENOUS at 15:41

## 2019-07-12 RX ADMIN — AMITRIPTYLINE HYDROCHLORIDE SCH MG: 10 TABLET, FILM COATED ORAL at 09:22

## 2019-07-12 RX ADMIN — DEXTROSE, SODIUM CHLORIDE, AND POTASSIUM CHLORIDE SCH MLS/HR: 5; .45; .15 INJECTION INTRAVENOUS at 10:32

## 2019-07-12 RX ADMIN — Medication SCH CAP: at 09:22

## 2019-07-12 RX ADMIN — CARVEDILOL SCH MG: 12.5 TABLET, FILM COATED ORAL at 09:23

## 2019-07-12 RX ADMIN — GUAIFENESIN AND DEXTROMETHORPHAN PRN ML: 100; 10 SYRUP ORAL at 06:37

## 2019-07-12 RX ADMIN — OXCARBAZEPINE SCH MG: 150 TABLET, FILM COATED ORAL at 09:22

## 2019-07-12 RX ADMIN — FLUOXETINE HYDROCHLORIDE SCH MG: 20 CAPSULE ORAL at 09:22

## 2019-08-07 ENCOUNTER — HOSPITAL ENCOUNTER (OUTPATIENT)
Dept: HOSPITAL 8 - CFH | Age: 79
Discharge: HOME | End: 2019-08-07
Attending: NURSE PRACTITIONER
Payer: MEDICARE

## 2019-08-07 DIAGNOSIS — R06.02: Primary | ICD-10-CM

## 2019-08-07 DIAGNOSIS — R91.1: ICD-10-CM

## 2019-08-07 DIAGNOSIS — N20.0: ICD-10-CM

## 2019-08-07 DIAGNOSIS — K80.20: ICD-10-CM

## 2019-08-07 PROCEDURE — 71250 CT THORAX DX C-: CPT

## 2019-08-15 ENCOUNTER — HOSPITAL ENCOUNTER (EMERGENCY)
Dept: HOSPITAL 8 - ED | Age: 79
Discharge: HOME | End: 2019-08-15
Payer: MEDICARE

## 2019-08-15 VITALS — WEIGHT: 176.37 LBS | BODY MASS INDEX: 32.46 KG/M2 | HEIGHT: 62 IN

## 2019-08-15 VITALS — DIASTOLIC BLOOD PRESSURE: 61 MMHG | SYSTOLIC BLOOD PRESSURE: 152 MMHG

## 2019-08-15 DIAGNOSIS — I10: ICD-10-CM

## 2019-08-15 DIAGNOSIS — E78.5: ICD-10-CM

## 2019-08-15 DIAGNOSIS — K80.20: Primary | ICD-10-CM

## 2019-08-15 PROCEDURE — 71275 CT ANGIOGRAPHY CHEST: CPT

## 2019-08-15 PROCEDURE — 83880 ASSAY OF NATRIURETIC PEPTIDE: CPT

## 2019-08-15 PROCEDURE — 93005 ELECTROCARDIOGRAM TRACING: CPT

## 2019-08-15 PROCEDURE — 36415 COLL VENOUS BLD VENIPUNCTURE: CPT

## 2019-08-15 PROCEDURE — 84484 ASSAY OF TROPONIN QUANT: CPT

## 2019-08-15 PROCEDURE — 99284 EMERGENCY DEPT VISIT MOD MDM: CPT

## 2019-08-15 PROCEDURE — 85025 COMPLETE CBC W/AUTO DIFF WBC: CPT

## 2019-08-15 PROCEDURE — 80053 COMPREHEN METABOLIC PANEL: CPT

## 2019-08-15 PROCEDURE — 76705 ECHO EXAM OF ABDOMEN: CPT

## 2019-08-15 PROCEDURE — 74175 CTA ABDOMEN W/CONTRAST: CPT

## 2020-01-01 ENCOUNTER — HOSPITAL ENCOUNTER (OUTPATIENT)
Facility: MEDICAL CENTER | Age: 80
End: 2020-09-02
Attending: PHYSICIAN ASSISTANT
Payer: MEDICARE

## 2020-01-01 ENCOUNTER — TELEPHONE (OUTPATIENT)
Dept: URGENT CARE | Facility: PHYSICIAN GROUP | Age: 80
End: 2020-01-01

## 2020-01-01 ENCOUNTER — OFFICE VISIT (OUTPATIENT)
Dept: URGENT CARE | Facility: CLINIC | Age: 80
End: 2020-01-01
Payer: MEDICARE

## 2020-01-01 VITALS
WEIGHT: 140 LBS | BODY MASS INDEX: 28.22 KG/M2 | DIASTOLIC BLOOD PRESSURE: 80 MMHG | HEART RATE: 66 BPM | OXYGEN SATURATION: 94 % | TEMPERATURE: 97 F | SYSTOLIC BLOOD PRESSURE: 140 MMHG | RESPIRATION RATE: 20 BRPM | HEIGHT: 59 IN

## 2020-01-01 DIAGNOSIS — Z87.440 HISTORY OF UTI: ICD-10-CM

## 2020-01-01 DIAGNOSIS — R42 DIZZINESS: ICD-10-CM

## 2020-01-01 DIAGNOSIS — R35.0 URINARY FREQUENCY: ICD-10-CM

## 2020-01-01 DIAGNOSIS — R35.0 URINARY FREQUENCY: Primary | ICD-10-CM

## 2020-01-01 LAB
APPEARANCE UR: CLEAR
BACTERIA UR CULT: NORMAL
BILIRUB UR STRIP-MCNC: NEGATIVE MG/DL
COLOR UR AUTO: YELLOW
GLUCOSE BLD-MCNC: 91 MG/DL (ref 70–100)
GLUCOSE UR STRIP.AUTO-MCNC: NEGATIVE MG/DL
KETONES UR STRIP.AUTO-MCNC: NEGATIVE MG/DL
LEUKOCYTE ESTERASE UR QL STRIP.AUTO: NORMAL
NITRITE UR QL STRIP.AUTO: NEGATIVE
PH UR STRIP.AUTO: 7.5 [PH] (ref 5–8)
PROT UR QL STRIP: NEGATIVE MG/DL
RBC UR QL AUTO: NEGATIVE
SIGNIFICANT IND 70042: NORMAL
SITE SITE: NORMAL
SOURCE SOURCE: NORMAL
SP GR UR STRIP.AUTO: 1.01
UROBILINOGEN UR STRIP-MCNC: 0.2 MG/DL

## 2020-01-01 PROCEDURE — 82962 GLUCOSE BLOOD TEST: CPT | Performed by: PHYSICIAN ASSISTANT

## 2020-01-01 PROCEDURE — 81002 URINALYSIS NONAUTO W/O SCOPE: CPT | Performed by: PHYSICIAN ASSISTANT

## 2020-01-01 PROCEDURE — 99214 OFFICE O/P EST MOD 30 MIN: CPT | Performed by: PHYSICIAN ASSISTANT

## 2020-01-01 PROCEDURE — 87086 URINE CULTURE/COLONY COUNT: CPT

## 2020-01-01 RX ORDER — CARVEDILOL 25 MG/1
25 TABLET ORAL
COMMUNITY
Start: 2020-01-01

## 2020-01-01 RX ORDER — AMLODIPINE BESYLATE 5 MG/1
5 TABLET ORAL
COMMUNITY
Start: 2020-01-01

## 2020-01-01 RX ORDER — LANSOPRAZOLE 30 MG/1
CAPSULE, DELAYED RELEASE ORAL
COMMUNITY
Start: 2020-01-01

## 2020-01-01 RX ORDER — PRAVASTATIN SODIUM 40 MG
40 TABLET ORAL
COMMUNITY
Start: 2020-01-01

## 2020-01-01 ASSESSMENT — ENCOUNTER SYMPTOMS
VERTIGO: 0
DIZZINESS: 1
FEVER: 0
HEADACHES: 0
EYES NEGATIVE: 1
ABDOMINAL PAIN: 0
NAUSEA: 0
VOMITING: 0
CHILLS: 0
ANOREXIA: 0
COUGH: 1

## 2020-07-28 ENCOUNTER — HOSPITAL ENCOUNTER (INPATIENT)
Dept: HOSPITAL 8 - ED | Age: 80
LOS: 3 days | Discharge: HOME | DRG: 193 | End: 2020-07-31
Attending: INTERNAL MEDICINE | Admitting: HOSPITALIST
Payer: MEDICARE

## 2020-07-28 VITALS — HEIGHT: 59 IN | BODY MASS INDEX: 31.69 KG/M2 | WEIGHT: 157.19 LBS

## 2020-07-28 VITALS — SYSTOLIC BLOOD PRESSURE: 118 MMHG | DIASTOLIC BLOOD PRESSURE: 72 MMHG

## 2020-07-28 DIAGNOSIS — J32.0: ICD-10-CM

## 2020-07-28 DIAGNOSIS — E03.9: ICD-10-CM

## 2020-07-28 DIAGNOSIS — Z20.828: ICD-10-CM

## 2020-07-28 DIAGNOSIS — E86.1: ICD-10-CM

## 2020-07-28 DIAGNOSIS — J15.9: Primary | ICD-10-CM

## 2020-07-28 DIAGNOSIS — Z88.0: ICD-10-CM

## 2020-07-28 DIAGNOSIS — Z90.710: ICD-10-CM
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
## 2020-07-28 DIAGNOSIS — G25.81: ICD-10-CM

## 2020-07-28 DIAGNOSIS — I10: ICD-10-CM

## 2020-07-28 DIAGNOSIS — Z86.73: ICD-10-CM

## 2020-07-28 DIAGNOSIS — R29.6: ICD-10-CM

## 2020-07-28 DIAGNOSIS — J96.01: ICD-10-CM

## 2020-07-28 DIAGNOSIS — E87.1: ICD-10-CM
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
## 2020-07-28 DIAGNOSIS — K59.00: ICD-10-CM

## 2020-07-28 DIAGNOSIS — I37.1: ICD-10-CM

## 2020-07-28 DIAGNOSIS — M16.0: ICD-10-CM
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
## 2020-07-28 DIAGNOSIS — Z91.018: ICD-10-CM

## 2020-07-28 DIAGNOSIS — S70.01XA: ICD-10-CM

## 2020-07-28 DIAGNOSIS — D64.9: ICD-10-CM

## 2020-07-28 DIAGNOSIS — E78.5: ICD-10-CM

## 2020-07-28 LAB
ALBUMIN SERPL-MCNC: 2.9 G/DL (ref 3.4–5)
ALP SERPL-CCNC: 102 U/L (ref 45–117)
ALT SERPL-CCNC: 23 U/L (ref 12–78)
ANION GAP SERPL CALC-SCNC: 9 MMOL/L (ref 5–15)
BASOPHILS # BLD AUTO: 0.04 X10^3/UL (ref 0–0.1)
BASOPHILS NFR BLD AUTO: 1 % (ref 0–1)
BILIRUB SERPL-MCNC: 0.4 MG/DL (ref 0.2–1)
CALCIUM SERPL-MCNC: 8.1 MG/DL (ref 8.5–10.1)
CHLORIDE SERPL-SCNC: 100 MMOL/L (ref 98–107)
CREAT SERPL-MCNC: 1.25 MG/DL (ref 0.55–1.02)
EOSINOPHIL # BLD AUTO: 0.22 X10^3/UL (ref 0–0.4)
EOSINOPHIL NFR BLD AUTO: 3 % (ref 1–7)
ERYTHROCYTE [DISTWIDTH] IN BLOOD BY AUTOMATED COUNT: 15.5 % (ref 9.6–15.2)
LYMPHOCYTES # BLD AUTO: 2.18 X10^3/UL (ref 1–3.4)
LYMPHOCYTES NFR BLD AUTO: 33 % (ref 22–44)
MCH RBC QN AUTO: 28 PG (ref 27–34.8)
MCHC RBC AUTO-ENTMCNC: 33 G/DL (ref 32.4–35.8)
MCV RBC AUTO: 84.8 FL (ref 80–100)
MD: NO
MICROSCOPIC: (no result)
MONOCYTES # BLD AUTO: 0.77 X10^3/UL (ref 0.2–0.8)
MONOCYTES NFR BLD AUTO: 12 % (ref 2–9)
NEUTROPHILS # BLD AUTO: 3.49 X10^3/UL (ref 1.8–6.8)
NEUTROPHILS NFR BLD AUTO: 52 % (ref 42–75)
PLATELET # BLD AUTO: 344 X10^3/UL (ref 130–400)
PMV BLD AUTO: 6.3 FL (ref 7.4–10.4)
PROT SERPL-MCNC: 7.4 G/DL (ref 6.4–8.2)
RBC # BLD AUTO: 3.9 X10^6/UL (ref 3.82–5.3)
TROPONIN I SERPL-MCNC: < 0.015 NG/ML (ref 0–0.04)

## 2020-07-28 PROCEDURE — 83615 LACTATE (LD) (LDH) ENZYME: CPT

## 2020-07-28 PROCEDURE — 36415 COLL VENOUS BLD VENIPUNCTURE: CPT

## 2020-07-28 PROCEDURE — 87635 SARS-COV-2 COVID-19 AMP PRB: CPT

## 2020-07-28 PROCEDURE — 71046 X-RAY EXAM CHEST 2 VIEWS: CPT

## 2020-07-28 PROCEDURE — 87040 BLOOD CULTURE FOR BACTERIA: CPT

## 2020-07-28 PROCEDURE — 93306 TTE W/DOPPLER COMPLETE: CPT

## 2020-07-28 PROCEDURE — 84145 PROCALCITONIN (PCT): CPT

## 2020-07-28 PROCEDURE — 83605 ASSAY OF LACTIC ACID: CPT

## 2020-07-28 PROCEDURE — 96367 TX/PROPH/DG ADDL SEQ IV INF: CPT

## 2020-07-28 PROCEDURE — 72125 CT NECK SPINE W/O DYE: CPT

## 2020-07-28 PROCEDURE — 80048 BASIC METABOLIC PNL TOTAL CA: CPT

## 2020-07-28 PROCEDURE — 81001 URINALYSIS AUTO W/SCOPE: CPT

## 2020-07-28 PROCEDURE — 85025 COMPLETE CBC W/AUTO DIFF WBC: CPT

## 2020-07-28 PROCEDURE — 85379 FIBRIN DEGRADATION QUANT: CPT

## 2020-07-28 PROCEDURE — 99285 EMERGENCY DEPT VISIT HI MDM: CPT

## 2020-07-28 PROCEDURE — 82728 ASSAY OF FERRITIN: CPT

## 2020-07-28 PROCEDURE — 84484 ASSAY OF TROPONIN QUANT: CPT

## 2020-07-28 PROCEDURE — 72110 X-RAY EXAM L-2 SPINE 4/>VWS: CPT

## 2020-07-28 PROCEDURE — 73523 X-RAY EXAM HIPS BI 5/> VIEWS: CPT

## 2020-07-28 PROCEDURE — 86140 C-REACTIVE PROTEIN: CPT

## 2020-07-28 PROCEDURE — 96365 THER/PROPH/DIAG IV INF INIT: CPT

## 2020-07-28 PROCEDURE — 80053 COMPREHEN METABOLIC PANEL: CPT

## 2020-07-28 PROCEDURE — 70450 CT HEAD/BRAIN W/O DYE: CPT

## 2020-07-28 PROCEDURE — 93005 ELECTROCARDIOGRAM TRACING: CPT

## 2020-07-28 PROCEDURE — 87086 URINE CULTURE/COLONY COUNT: CPT

## 2020-07-28 RX ADMIN — SODIUM CHLORIDE SCH MLS/HR: 0.9 INJECTION, SOLUTION INTRAVENOUS at 23:17

## 2020-07-28 RX ADMIN — CARVEDILOL SCH MG: 12.5 TABLET, FILM COATED ORAL at 23:49

## 2020-07-28 RX ADMIN — HEPARIN SODIUM SCH UNITS: 5000 INJECTION, SOLUTION INTRAVENOUS; SUBCUTANEOUS at 23:49

## 2020-07-28 NOTE — NUR
ASSISTED PT UP TO [facility] AGAIN, PT VOIDED. ASSISTED BACK INTO [facility].

## 2020-07-28 NOTE — NUR
PT BIB [facility] FROM HOME FOR C/O DIZZINESS AND MULTIPLE FALLS TODAY. PT THEN 
WASN'T ABLE TO AMBULATE, STATES, "IT JUST WOULDN'T WORK". ALSO C/O L SIDE/HIP 
PAIN. NO SHORTENING OF LLE OR OTHER INJURIES NOTED. PT REPORTS GLF 1 MONTH AGO. 
12-LEAD UNREMARKABLE PER EMS. PT OX3, CONFUSED AT TIMES, DISORIENTED TO DATE.

## 2020-07-28 NOTE — NUR
ASSISTED PT UP TO [facility], PT WAS ABLE TO GET UP AND STAND AT SIDE OF BED WITHOUT 
PAIN. INSTRUCTED ON CLEAN CATCH URINE SAMPLE.

## 2020-07-29 VITALS — SYSTOLIC BLOOD PRESSURE: 100 MMHG | DIASTOLIC BLOOD PRESSURE: 64 MMHG

## 2020-07-29 VITALS — DIASTOLIC BLOOD PRESSURE: 65 MMHG | SYSTOLIC BLOOD PRESSURE: 98 MMHG

## 2020-07-29 VITALS — DIASTOLIC BLOOD PRESSURE: 69 MMHG | SYSTOLIC BLOOD PRESSURE: 109 MMHG

## 2020-07-29 VITALS — SYSTOLIC BLOOD PRESSURE: 103 MMHG | DIASTOLIC BLOOD PRESSURE: 66 MMHG

## 2020-07-29 VITALS — SYSTOLIC BLOOD PRESSURE: 107 MMHG | DIASTOLIC BLOOD PRESSURE: 57 MMHG

## 2020-07-29 VITALS — DIASTOLIC BLOOD PRESSURE: 70 MMHG | SYSTOLIC BLOOD PRESSURE: 124 MMHG

## 2020-07-29 VITALS — SYSTOLIC BLOOD PRESSURE: 108 MMHG | DIASTOLIC BLOOD PRESSURE: 69 MMHG

## 2020-07-29 LAB
ANION GAP SERPL CALC-SCNC: 6 MMOL/L (ref 5–15)
BASOPHILS # BLD AUTO: 0.01 X10^3/UL (ref 0–0.1)
BASOPHILS NFR BLD AUTO: 0 % (ref 0–1)
CALCIUM SERPL-MCNC: 8.4 MG/DL (ref 8.5–10.1)
CHLORIDE SERPL-SCNC: 105 MMOL/L (ref 98–107)
CREAT SERPL-MCNC: 1.12 MG/DL (ref 0.55–1.02)
EOSINOPHIL # BLD AUTO: 0.22 X10^3/UL (ref 0–0.4)
EOSINOPHIL NFR BLD AUTO: 4 % (ref 1–7)
ERYTHROCYTE [DISTWIDTH] IN BLOOD BY AUTOMATED COUNT: 15.6 % (ref 9.6–15.2)
LYMPHOCYTES # BLD AUTO: 1.8 X10^3/UL (ref 1–3.4)
LYMPHOCYTES NFR BLD AUTO: 35 % (ref 22–44)
MCH RBC QN AUTO: 27.9 PG (ref 27–34.8)
MCHC RBC AUTO-ENTMCNC: 32.7 G/DL (ref 32.4–35.8)
MCV RBC AUTO: 85.2 FL (ref 80–100)
MD: NO
MONOCYTES # BLD AUTO: 0.63 X10^3/UL (ref 0.2–0.8)
MONOCYTES NFR BLD AUTO: 12 % (ref 2–9)
NEUTROPHILS # BLD AUTO: 2.51 X10^3/UL (ref 1.8–6.8)
NEUTROPHILS NFR BLD AUTO: 49 % (ref 42–75)
PLATELET # BLD AUTO: 318 X10^3/UL (ref 130–400)
PMV BLD AUTO: 6.5 FL (ref 7.4–10.4)
RBC # BLD AUTO: 3.63 X10^6/UL (ref 3.82–5.3)

## 2020-07-29 RX ADMIN — AMLODIPINE BESYLATE SCH MG: 5 TABLET ORAL at 08:57

## 2020-07-29 RX ADMIN — CEFTRIAXONE SCH MLS/HR: 1 INJECTION, SOLUTION INTRAVENOUS at 21:19

## 2020-07-29 RX ADMIN — LISINOPRIL SCH MG: 5 TABLET ORAL at 08:57

## 2020-07-29 RX ADMIN — LEVOTHYROXINE SODIUM SCH MCG: 75 TABLET ORAL at 08:58

## 2020-07-29 RX ADMIN — FUROSEMIDE SCH MG: 40 TABLET ORAL at 08:57

## 2020-07-29 RX ADMIN — SODIUM CHLORIDE SCH MLS/HR: 0.9 INJECTION, SOLUTION INTRAVENOUS at 17:04

## 2020-07-29 RX ADMIN — ACETAMINOPHEN PRN MG: 325 TABLET, FILM COATED ORAL at 21:18

## 2020-07-29 RX ADMIN — ATORVASTATIN CALCIUM SCH MG: 20 TABLET, FILM COATED ORAL at 08:57

## 2020-07-29 RX ADMIN — PANTOPRAZOLE SODIUM SCH MG: 40 TABLET, DELAYED RELEASE ORAL at 08:58

## 2020-07-29 RX ADMIN — AZITHROMYCIN FOR INJECTION INJECTION, POWDER, LYOPHILIZED, FOR SOLUTION SCH MLS/HR: 500 INJECTION INTRAVENOUS at 22:25

## 2020-07-29 RX ADMIN — HEPARIN SODIUM SCH UNITS: 5000 INJECTION, SOLUTION INTRAVENOUS; SUBCUTANEOUS at 08:57

## 2020-07-29 RX ADMIN — FLUOXETINE HYDROCHLORIDE SCH MG: 20 CAPSULE ORAL at 08:58

## 2020-07-29 RX ADMIN — ACETAMINOPHEN PRN MG: 325 TABLET, FILM COATED ORAL at 14:04

## 2020-07-29 RX ADMIN — HEPARIN SODIUM SCH UNITS: 5000 INJECTION, SOLUTION INTRAVENOUS; SUBCUTANEOUS at 17:02

## 2020-07-29 RX ADMIN — DOCUSATE SODIUM 50MG AND SENNOSIDES 8.6MG SCH TAB: 8.6; 5 TABLET, FILM COATED ORAL at 08:57

## 2020-07-29 RX ADMIN — CARVEDILOL SCH MG: 12.5 TABLET, FILM COATED ORAL at 22:24

## 2020-07-29 RX ADMIN — AMITRIPTYLINE HYDROCHLORIDE SCH MG: 10 TABLET, FILM COATED ORAL at 08:58

## 2020-07-29 RX ADMIN — CARVEDILOL SCH MG: 12.5 TABLET, FILM COATED ORAL at 08:58

## 2020-07-30 VITALS — DIASTOLIC BLOOD PRESSURE: 65 MMHG | SYSTOLIC BLOOD PRESSURE: 121 MMHG

## 2020-07-30 VITALS — SYSTOLIC BLOOD PRESSURE: 124 MMHG | DIASTOLIC BLOOD PRESSURE: 86 MMHG

## 2020-07-30 VITALS — DIASTOLIC BLOOD PRESSURE: 69 MMHG | SYSTOLIC BLOOD PRESSURE: 115 MMHG

## 2020-07-30 VITALS — DIASTOLIC BLOOD PRESSURE: 79 MMHG | SYSTOLIC BLOOD PRESSURE: 120 MMHG

## 2020-07-30 LAB
<PLATELET ESTIMATE>: ADEQUATE
<PLT MORPHOLOGY>: (no result)
ANION GAP SERPL CALC-SCNC: 6 MMOL/L (ref 5–15)
ANISOCYTOSIS BLD QL SMEAR: (no result)
CALCIUM SERPL-MCNC: 8.6 MG/DL (ref 8.5–10.1)
CHLORIDE SERPL-SCNC: 111 MMOL/L (ref 98–107)
CREAT SERPL-MCNC: 0.84 MG/DL (ref 0.55–1.02)
EOS#(MANUAL): 0.12 X10^3/UL (ref 0–0.4)
EOS% (MANUAL): 3 % (ref 1–7)
ERYTHROCYTE [DISTWIDTH] IN BLOOD BY AUTOMATED COUNT: 16 % (ref 9.6–15.2)
LYMPH#(MANUAL): 1.68 X10^3/UL (ref 1–3.4)
LYMPHS% (MANUAL): 43 % (ref 22–44)
MCH RBC QN AUTO: 28.1 PG (ref 27–34.8)
MCHC RBC AUTO-ENTMCNC: 32.8 G/DL (ref 32.4–35.8)
MCV RBC AUTO: 85.5 FL (ref 80–100)
MD: YES
MONOS#(MANUAL): 0.43 X10^3/UL (ref 0.3–2.7)
MONOS% (MANUAL): 11 % (ref 2–9)
OVALOCYTES BLD QL SMEAR: (no result)
PLATELET # BLD AUTO: 320 X10^3/UL (ref 130–400)
PMV BLD AUTO: 6.6 FL (ref 7.4–10.4)
RBC # BLD AUTO: 3.72 X10^6/UL (ref 3.82–5.3)
SEG#(MANUAL): 1.68 X10^3/UL (ref 1.8–6.8)
SEGS% (MANUAL): 43 % (ref 42–75)

## 2020-07-30 RX ADMIN — AZITHROMYCIN FOR INJECTION INJECTION, POWDER, LYOPHILIZED, FOR SOLUTION SCH MLS/HR: 500 INJECTION INTRAVENOUS at 23:33

## 2020-07-30 RX ADMIN — AMITRIPTYLINE HYDROCHLORIDE SCH MG: 10 TABLET, FILM COATED ORAL at 09:39

## 2020-07-30 RX ADMIN — HEPARIN SODIUM SCH UNITS: 5000 INJECTION, SOLUTION INTRAVENOUS; SUBCUTANEOUS at 09:37

## 2020-07-30 RX ADMIN — ACETAMINOPHEN PRN MG: 325 TABLET, FILM COATED ORAL at 13:02

## 2020-07-30 RX ADMIN — FLUOXETINE HYDROCHLORIDE SCH MG: 20 CAPSULE ORAL at 09:39

## 2020-07-30 RX ADMIN — ACETAMINOPHEN PRN MG: 325 TABLET, FILM COATED ORAL at 21:27

## 2020-07-30 RX ADMIN — LEVOTHYROXINE SODIUM SCH MCG: 75 TABLET ORAL at 06:20

## 2020-07-30 RX ADMIN — DOCUSATE SODIUM 50MG AND SENNOSIDES 8.6MG SCH TAB: 8.6; 5 TABLET, FILM COATED ORAL at 09:00

## 2020-07-30 RX ADMIN — LISINOPRIL SCH MG: 5 TABLET ORAL at 09:39

## 2020-07-30 RX ADMIN — AMLODIPINE BESYLATE SCH MG: 5 TABLET ORAL at 09:38

## 2020-07-30 RX ADMIN — CARVEDILOL SCH MG: 12.5 TABLET, FILM COATED ORAL at 21:22

## 2020-07-30 RX ADMIN — HEPARIN SODIUM SCH UNITS: 5000 INJECTION, SOLUTION INTRAVENOUS; SUBCUTANEOUS at 15:47

## 2020-07-30 RX ADMIN — PANTOPRAZOLE SODIUM SCH MG: 40 TABLET, DELAYED RELEASE ORAL at 06:20

## 2020-07-30 RX ADMIN — CEFTRIAXONE SCH MLS/HR: 1 INJECTION, SOLUTION INTRAVENOUS at 21:22

## 2020-07-30 RX ADMIN — ATORVASTATIN CALCIUM SCH MG: 20 TABLET, FILM COATED ORAL at 09:37

## 2020-07-30 RX ADMIN — CARVEDILOL SCH MG: 12.5 TABLET, FILM COATED ORAL at 09:00

## 2020-07-30 RX ADMIN — FUROSEMIDE SCH MG: 40 TABLET ORAL at 09:38

## 2020-07-30 RX ADMIN — HEPARIN SODIUM SCH UNITS: 5000 INJECTION, SOLUTION INTRAVENOUS; SUBCUTANEOUS at 00:14

## 2020-07-31 VITALS — SYSTOLIC BLOOD PRESSURE: 112 MMHG | DIASTOLIC BLOOD PRESSURE: 73 MMHG

## 2020-07-31 VITALS — DIASTOLIC BLOOD PRESSURE: 75 MMHG | SYSTOLIC BLOOD PRESSURE: 116 MMHG

## 2020-07-31 VITALS — SYSTOLIC BLOOD PRESSURE: 104 MMHG | DIASTOLIC BLOOD PRESSURE: 60 MMHG

## 2020-07-31 LAB — CRP SERPL-MCNC: 0.5 MG/DL (ref 0.02–0.49)

## 2020-07-31 RX ADMIN — CARVEDILOL SCH MG: 12.5 TABLET, FILM COATED ORAL at 09:04

## 2020-07-31 RX ADMIN — FLUOXETINE HYDROCHLORIDE SCH MG: 20 CAPSULE ORAL at 09:04

## 2020-07-31 RX ADMIN — HEPARIN SODIUM SCH UNITS: 5000 INJECTION, SOLUTION INTRAVENOUS; SUBCUTANEOUS at 00:24

## 2020-07-31 RX ADMIN — DOCUSATE SODIUM 50MG AND SENNOSIDES 8.6MG SCH TAB: 8.6; 5 TABLET, FILM COATED ORAL at 09:05

## 2020-07-31 RX ADMIN — PANTOPRAZOLE SODIUM SCH MG: 40 TABLET, DELAYED RELEASE ORAL at 05:18

## 2020-07-31 RX ADMIN — AMITRIPTYLINE HYDROCHLORIDE SCH MG: 10 TABLET, FILM COATED ORAL at 09:04

## 2020-07-31 RX ADMIN — ATORVASTATIN CALCIUM SCH MG: 20 TABLET, FILM COATED ORAL at 09:04

## 2020-07-31 RX ADMIN — HEPARIN SODIUM SCH UNITS: 5000 INJECTION, SOLUTION INTRAVENOUS; SUBCUTANEOUS at 16:32

## 2020-07-31 RX ADMIN — AMLODIPINE BESYLATE SCH MG: 5 TABLET ORAL at 09:04

## 2020-07-31 RX ADMIN — ACETAMINOPHEN PRN MG: 325 TABLET, FILM COATED ORAL at 10:57

## 2020-07-31 RX ADMIN — HEPARIN SODIUM SCH UNITS: 5000 INJECTION, SOLUTION INTRAVENOUS; SUBCUTANEOUS at 09:07

## 2020-07-31 RX ADMIN — FUROSEMIDE SCH MG: 40 TABLET ORAL at 10:15

## 2020-07-31 RX ADMIN — LEVOTHYROXINE SODIUM SCH MCG: 75 TABLET ORAL at 05:18

## 2020-07-31 RX ADMIN — LISINOPRIL SCH MG: 5 TABLET ORAL at 09:04

## 2020-09-02 NOTE — PROGRESS NOTES
Subjective:      Ade Hermosillo is a 80 y.o. female who presents with Dizziness (xtoday, dizziness,frequent urination, dark urine)    Medications:    • albuterol Aers  • amitriptyline Tabs  • amLODIPine Tabs  • carvedilol Tabs  • FLUoxetine Caps  • furosemide Tabs  • lansoprazole Cpdr  • levothyroxine Tabs  • oxyCODONE-acetaminophen Tabs  • potassium chloride SA Tbcr  • pravastatin  • pregabalin Caps    Allergies: Amoxicillin and Pcn [penicillins]    Problem List: Ade Hermosillo has Hypothyroid; Fibromyalgia; Hypertension; Anxiety and depression; Hyperlipidemia with target LDL less than 100; Normochromic normocytic anemia; and Acute respiratory failure with hypoxia (HCC) on their problem list.    Surgical History:  Past Surgical History:   Procedure Laterality Date   • BRONCHOSCOPY-ENDO N/A 1/28/2018    Procedure: BRONCHOSCOPY-ENDO;  Surgeon: Kory Winters M.D.;  Location: [facility];  Service: Ent   • ABDOMINAL HYSTERECTOMY TOTAL     • APPENDECTOMY     • HYSTERECTOMY LAPAROSCOPY         Past Social Hx: Ade Hermosillo  reports that she has never smoked. She has never used smokeless tobacco. She reports that she does not drink alcohol or use drugs.     Past Family Hx:  Ade Hermosillo family history includes Alcohol/Drug in her father; Cancer in her mother.     Problem list, medications, and allergies reviewed by myself today in Epic.             Patient presents with:  Dizziness: xtoday, dizziness,frequent urination, dark urine.   Patient presents to clinic today with complaint of dizziness, urinary frequency and dark urine.  Patient's daughter brought her in for evaluation of possible UTI.  Patient has a history of recurrent UTI however she has just completed a course of antibiotics 2 weeks ago that was prescribed by her previous primary care provider.  Patient's daughter called EMS this morning due to some confusion and dizziness, they did an EKG which was normal a fingerstick  "glucose which was normal checked her vital signs which per the patient and the patient's daughter were totally normal.  Patient declined [facility] transport to any hospital for further evaluation.  Patient's daughter insisted that she come in for UA to make sure it was not a UTI, which patient agreed to do.  Patient denies any fever, chills, nausea, vomiting, diarrhea, chest pain or shortness of breath.    Dizziness  This is a new problem. The current episode started 1 to 4 weeks ago. The problem occurs intermittently. The problem has been unchanged. Associated symptoms include coughing (Chronic, baseline for patient) and urinary symptoms. Pertinent negatives include no abdominal pain, anorexia, chest pain, chills, congestion, fever, headaches, nausea, rash, vertigo or vomiting. The symptoms are aggravated by standing and walking. She has tried position changes, rest and drinking for the symptoms. The treatment provided mild relief.       Review of Systems   Constitutional: Negative for chills, fever and malaise/fatigue.   HENT: Negative for congestion.    Eyes: Negative.    Respiratory: Positive for cough (Chronic, baseline for patient).    Cardiovascular: Negative for chest pain and leg swelling.   Gastrointestinal: Negative for abdominal pain, anorexia, nausea and vomiting.   Genitourinary: Positive for frequency.   Skin: Negative for rash.   Neurological: Positive for dizziness. Negative for vertigo and headaches.   All other systems reviewed and are negative.         Objective:     /80 (BP Location: Left arm, Patient Position: Sitting, BP Cuff Size: Adult)   Pulse 66   Temp 36.1 °C (97 °F) (Temporal)   Resp 20   Ht 1.499 m (4' 11\")   Wt 63.5 kg (140 lb)   SpO2 94%   BMI 28.28 kg/m²      Physical Exam  Vitals signs and nursing note reviewed.   Constitutional:       General: She is not in acute distress.     Appearance: Normal appearance. She is well-developed and normal weight. She is not ill-appearing " or toxic-appearing.   HENT:      Head: Normocephalic and atraumatic.      Right Ear: Tympanic membrane normal.      Left Ear: Tympanic membrane normal.      Nose: Nose normal.      Mouth/Throat:      Mouth: Mucous membranes are moist.      Pharynx: Oropharynx is clear.   Eyes:      Extraocular Movements: Extraocular movements intact.      Conjunctiva/sclera: Conjunctivae normal.      Pupils: Pupils are equal, round, and reactive to light.   Neck:      Musculoskeletal: Normal range of motion and neck supple.   Cardiovascular:      Rate and Rhythm: Normal rate and regular rhythm.      Pulses: Normal pulses.      Heart sounds: Normal heart sounds.   Pulmonary:      Effort: Pulmonary effort is normal.      Breath sounds: No stridor. No wheezing, rhonchi or rales.   Abdominal:      General: Bowel sounds are normal.      Palpations: Abdomen is soft.      Tenderness: There is no guarding or rebound.   Musculoskeletal: Normal range of motion.   Skin:     General: Skin is warm and dry.      Capillary Refill: Capillary refill takes less than 2 seconds.   Neurological:      General: No focal deficit present.      Mental Status: She is alert and oriented to person, place, and time.      Gait: Gait normal.   Psychiatric:         Mood and Affect: Mood normal.         Behavior: Behavior is cooperative.            Lab Results   Component Value Date/Time    POCCOLOR yellow 09/02/2020 01:40 PM    POCAPPEAR clear 09/02/2020 01:40 PM    POCLEUKEST trace 09/02/2020 01:40 PM    POCNITRITE negative 09/02/2020 01:40 PM    POCUROBILIGE 0.2 09/02/2020 01:40 PM    POCPROTEIN negative 09/02/2020 01:40 PM    POCURPH 7.5 09/02/2020 01:40 PM    POCBLOOD negative 09/02/2020 01:40 PM    POCSPGRV 1.015 09/02/2020 01:40 PM    POCKETONES negative 09/02/2020 01:40 PM    POCBILIRUBIN negative 09/02/2020 01:40 PM    POCGLUCUA negative 09/02/2020 01:40 PM       Glucose: 91    Assessment/Plan:     1. History of UTI  POCT Urinalysis    POCT Glucose    URINE  CULTURE(NEW)   2. Dizziness  POCT Glucose    URINE CULTURE(NEW)   3. Urinary frequency  POCT Glucose    URINE CULTURE(NEW)     Patient declined EKG, chest x-ray.  Patient states she would like to wait for urine culture results before doing anything else today.  Patient's daughter will drive her home, will call EMS with any worsening of symptoms.    Culture sent to lab, will call with any necessary treatment or treatment changes.     PT should follow up with PCP in 1-2 days for re-evaluation if symptoms have not improved.  Discussed red flags and reasons to return to UC or ED.  Pt and/or family verbalized understanding of diagnosis and follow up instructions and was offered informational handout on diagnosis.  PT discharged.

## 2020-09-07 NOTE — TELEPHONE ENCOUNTER
Attempted to contact patient lab results.  No voice mail set up on phone number.     Left VM for daughter, with negative urine culture results.

## 2021-01-01 DIAGNOSIS — Z23 NEED FOR VACCINATION: ICD-10-CM

## 2022-06-06 NOTE — DISCHARGE PLANNING
[address] ENCOUNTER      Pt Name: Hope Burnham  MRN: [id number]  [place] 1946  Date of evaluation: 6/5/2022  Provider: Renetta Knight MD    CHIEF COMPLAINT       Chief Complaint   Patient presents with    Knee Pain     L knee pain and swelling x2 days         HISTORY OF PRESENT ILLNESS      Hope Burnham is a 67 y.o. female who presents to the emergency department  for   Chief Complaint   Patient presents with    Knee Pain     L knee pain and swelling x2 days       76-year-old female presents with several days of left knee pain and swelling. Pain is worse with walking. She does have a history of knee replacement on the left. Was done remotely. She reports that the orthopedic surgeon that did her knee replacement has retired. She did not have a fall or injury. Denies any fever or chills. Denies any skin changes or warmth or redness at the knee. She has been trying Tylenol at home with incomplete relief of symptoms. Denies any weakness or numbness in the leg. She denies any other remarkable symptoms. From review of records, she is on Eliquis. She is have a history of paroxysmal atrial fibrillation. GCS of 15 in the emergency department. She is grossly moving all extremities spontaneously. Nursing Notes, Triage Notes & Vital Signs were reviewed. REVIEW OF SYSTEMS    (2-9 systems for level 4, 10 or more for level 5)     Review of Systems   Constitutional: Negative for chills and fever. HENT: Negative for congestion, rhinorrhea and sore throat. Eyes: Negative for pain and discharge. Respiratory: Negative for cough and shortness of breath. Cardiovascular: Negative for chest pain and palpitations. Gastrointestinal: Negative for abdominal pain, nausea and vomiting. Endocrine: Negative for polydipsia and polyuria. Genitourinary: Negative for dysuria and flank pain.    Musculoskeletal: SW spoke with bedside RN about walking O2 orders. Bedside RN to f/u with hospitalist about order/need. HH order also need. TCN obtained choice for Hh.    Plan: f/u about HH order and O2 walking stats/oder. Currently pt does not have qualifying diagnosis for home O2.   Negative for back pain and neck pain. Knee pain   Skin: Negative for pallor and wound. Neurological: Negative for dizziness, facial asymmetry, light-headedness, numbness and headaches. Psychiatric/Behavioral: Negative for confusion. Except as noted above the remainder of the review of systems was reviewed and negative. PAST MEDICAL HISTORY     Past Medical History:   Diagnosis Date    14 day event monitor 11/05/2018    Sinus rhythm    Atrial fibrillation with RVR ([facility] 11/25/2013    Breast cancer ([facility] [address] [facility]     Edema     6/83 TTE diastolic dysfxn, EF 35%; 21/85 - TTE diastolic dysfxn, EF 94%; Stress myoview  WNl, EF 70%    Family history of cardiovascular disease     GERD (gastroesophageal reflux disease)     H/O 24 hour EKG monitoring 4/23/12    [facility]    H/O cardiovascular stress test     4/23/12-[facility], Probably norm perfusion Lexiscan cardiolite study except for diaphramatic attenuation artifact, otherwise perfusion is normal, norm LV fxn by gated scan. 11/10-EF70%, 9/15/08-EF70%, 1/15/07-EF70%, 9/03    H/O echocardiogram     4/23/12-[facility]- Norm chamber sizes. LVH with norm LV systolic but abn diastolic fxn, mild MR and TR, minimal pulm HTN. 11/10 -EF>55%; 3/05, 9/23/03    History of cardiac catheterization     11/15/2013-EF 55%, No signif CAD -Dr Berta Rios;;    History of cardiovascular stress test     11/14/2013-EF 70%. Normal Lexiscan Cardiolite, Uniform wall motion;    History of echocardiogram     35/27/2338-[facility] global systolic  function. No wall motion abnormalities. EF 55%. Mild MR/TR;    History of Holter monitoring 11/17/14 11/14-48 hour Holter: Predominant rhythm was sinus, no ventricular ectopy noted, supraventricular ectopics were noted in single beat forms.  History of therapeutic radiation     Hx of 24 hour EKG monitoring     12/17/13 48 hr holter monitor. Sinus rhythm with intermittent sinus arrhythmia.     Hyperlipidemia     Hypertension     11/13 Cath WNL, EF 55%; 11/13 Stress WNL : 11/13 TTE mild MR and TR, EF 55%; 4/12 Stress WNL; 8/9 - Cath WNL    Iron deficiency anemia 7/9/2013 9/13 EGD WNL    Lumbar radiculopathy 11/25/2013    Menopause     ADOLFO (obstructive sleep apnea)     sleep study 10/3 CPAP 6cm     Osteoarthritis     both knees    Osteopenia     9/12 DEXA T-score -1.5    Other activity(E029.9)     48 hr holter, the 48 hr holter monitor reveals the patient in the sinus rhythm with occasional supraventricular ectopic beats. There is a rare short atrial run.  Paroxysmal atrial fibrillation (HCC)     4/12 Holter [facility]    Prolonged emergence from general anesthesia     Proteinuria     Right Breast Cancer Dx 10-12    Supraventricular tachycardia (HCC)     Type 2 diabetes mellitus (HCC) Dx 2000's    Urge incontinence     Wears partial dentures     upper partial       Prior to Admission medications    Medication Sig Start Date End Date Taking? Authorizing Provider   HYDROcodone-acetaminophen (NORCO) 5-325 MG per tablet Take 1 tablet by mouth every 8 hours as needed for Pain for up to 2 days. Intended supply: 3 days.  Take lowest dose possible to manage pain 6/6/22 6/8/22 Yes Patience Roldan MD   apixaban Dinah Relic) 2.5 MG TABS tablet Take 1 tablet by mouth 2 times daily 5/25/22   Iam Mayfield MD   dilTIAZem (CARDIZEM CD) 120 MG extended release capsule take 1 capsule by mouth once daily 5/23/22   Livia Huitron MD   colchicine (COLCRYS) 0.6 MG tablet Take 1 tablet by mouth 2 times daily as needed for Pain (gout) 5/16/22   FAITH Sainz - CNP   apixaban (ELIQUIS) 2.5 MG TABS tablet Take 1 tablet by mouth 2 times daily 4/19/22   Livia Huitron MD   propafenone (RYTHMOL) 150 MG tablet Take 1 tablet by mouth every 8 hours 3/31/22   Iam Mayfield MD   atorvastatin (LIPITOR) 80 MG tablet Take 1 tab by mouth once daily 3/7/22   Mario Chaves MD   metoprolol tartrate (LOPRESSOR) 50 MG tablet Take 1 tablet by mouth 2 times daily take 1 tablet by mouth twice a day 3/7/22   Den Dale MD   doxazosin (CARDURA) 4 MG tablet Take 1 tablet by mouth daily 1/18/22   Den Dale MD   isosorbide mononitrate (IMDUR) 30 MG extended release tablet Take 1 tablet by mouth 2 times daily 1/18/22 4/18/22  Den Dale MD   chlorthalidone (HYGROTON) 25 MG tablet Take 1 tablet by mouth daily 1/18/22   Den Dale MD   cloNIDine (CATAPRES-TTS-3) 0.3 MG/24HR PTWK Place 1 patch onto the skin once a week 11/9/21 4/18/22  Den Dale MD   furosemide (LASIX) 20 MG tablet take 1 tablet by mouth every other day 8/2/21   Maykel Tejada MD   oxybutynin (DITROPAN) 5 MG tablet take 1 tablet by mouth twice a day 7/9/21   Maykel Tejada MD   Ascorbic Acid (VITAMIN C) 250 MG tablet Take by mouth daily    Historical Provider, MD   Omega-3 Fatty Acids (FISH OIL PO) Take 1,200 mg by mouth    Historical Provider, MD   FOLIC ACID PO Take 032 mg by mouth daily    Historical Provider, MD   Multiple Vitamins-Minerals (MULTIVITAMIN ADULT PO) Take by mouth    Historical Provider, MD   aspirin 81 MG EC tablet Take 1 tablet by mouth daily 11/13/18   Maykel Tejada MD   Lancets MISC 1 each by In Vitro route 2 times daily 12/1/17   Maykel Tejada MD   Glucose Blood (BLOOD GLUCOSE TEST STRIPS) STRP 1 each by In Vitro route 2 times daily 12/1/17   Maykel Tejada MD   Blood Glucose Monitoring Suppl AV 1 kit by Does not apply route daily 12/1/17   Maykel Tejada MD   oxybutynin (DITROPAN) 5 MG tablet Take 1 tablet by mouth 2 times daily. 1/7/13 1/21/14  Maykel Tejada MD   Calcium Carbonate (CALTRATE 600 PO) Take  by mouth 2 times daily.     Historical Provider, MD        Patient Active Problem List   Diagnosis    Osteoarthritis    ADOLFO (obstructive sleep apnea)    Menopause    Paroxysmal atrial fibrillation (HCC)    Osteopenia    Colon polyps    Bilateral leg edema    Urge incontinence    Edema of both legs    Mixed hyperlipidemia    Iron deficiency anemia    Gastroesophageal reflux disease without esophagitis    Hypertension secondary to other renal disorders    Abnormal EKG    Type 2 diabetes mellitus with nephropathy (HCC)    Carpal tunnel syndrome on left    Trigger finger, left ring finger    Acute blood loss anemia    Obesity (BMI 30-39. 9)    Excessive daytime sleepiness    Ex-cigarette smoker    Malignant neoplasm of upper-outer quadrant of right female breast (HCC)    Intestinal malabsorption    Acquired cyst of kidney    Chronic kidney disease, stage V (HCC)    Syncope and collapse    [facility]I [facility]) [facility]    Angina pectoris, unspecified    Atherosclerotic heart disease of native coronary artery with unspecified angina pectoris    Hyperpotassemia         SURGICAL HISTORY       Past Surgical History:   Procedure Laterality Date    BREAST BIOPSY  10-12    Right Breast Biopsy, Cancer    BREAST BIOPSY  1970's    Right Breast Biopsy, Benign    BREAST LUMPECTOMY  11/6/12    Breast cancer - Right with sentinal node    BUNIONECTOMY  1990's    Right [place] [address] Dr. Sheila Tatum    8/10/09- The patient has no significant CAD. The elevated troponin is probably secondary to SVT. , 10/03- no significant CAD    CARPAL TUNNEL RELEASE Left 01/2019    CATARACT REMOVAL Bilateral 03/2020, 5/20    CATARACT REMOVAL Left 05/2020    COLONOSCOPY  \"X 2 Last One 2008 \"    Polpy Removed During Last Colonoscopy    DENTAL SURGERY      Teeth Extracted In Past    DIALYSIS CATHETER INSERTION N/A 3/30/2022    CATHETER INSERTION PERITONEAL DIALYSIS LAPAROSCOPIC performed by Rodrigue Polanco MD at [address] [place] [address], COLON, DIAGNOSTIC  07/18/2016    savary dilatation to 17 mm    HYSTERECTOMY, TOTAL ABDOMINAL  1980's    JOINT REPLACEMENT  2009    Total Left Knee    JOINT REPLACEMENT  2010    Total Right Knee    SOFI STEROTACTIC LOC BREAST BIOPSY RIGHT Right 2/18/2021    SOFI STEROTACTIC LOC BREAST BIOPSY RIGHT [facility]    OVARY REMOVAL      UPPER GASTROINTESTINAL ENDOSCOPY N/A 11/8/2018    EGD DIAGNOSTIC ONLY performed by Gayla Gibbs MD at [address]       Discharge Medication List as of 6/6/2022  1:49 AM      CONTINUE these medications which have NOT CHANGED    Details   !! apixaban (ELIQUIS) 2.5 MG TABS tablet Take 1 tablet by mouth 2 times daily, Disp-42 tablet, R-0Lot# [id number]   Exp 4/23  3 boxes #42Sample      dilTIAZem (CARDIZEM CD) 120 MG extended release capsule take 1 capsule by mouth once daily, Disp-90 capsule, R-3Normal      colchicine (COLCRYS) 0.6 MG tablet Take 1 tablet by mouth 2 times daily as needed for Pain (gout), Disp-30 tablet, R-0Normal      !! apixaban (ELIQUIS) 2.5 MG TABS tablet Take 1 tablet by mouth 2 times daily, Disp-180 tablet, R-32Normal      propafenone (RYTHMOL) 150 MG tablet Take 1 tablet by mouth every 8 hours, Disp-30 tablet, R-5Normal      atorvastatin (LIPITOR) 80 MG tablet Take 1 tab by mouth once daily, Disp-90 tablet, R-3Normal      metoprolol tartrate (LOPRESSOR) 50 MG tablet Take 1 tablet by mouth 2 times daily take 1 tablet by mouth twice a day, Disp-180 tablet, R-3Normal      doxazosin (CARDURA) 4 MG tablet Take 1 tablet by mouth daily, Disp-30 tablet, R-3Normal      isosorbide mononitrate (IMDUR) 30 MG extended release tablet Take 1 tablet by mouth 2 times daily, Disp-60 tablet, R-3Normal      chlorthalidone (HYGROTON) 25 MG tablet Take 1 tablet by mouth daily, Disp-30 tablet, R-3Normal      cloNIDine (CATAPRES-TTS-3) 0.3 MG/24HR PTWK Place 1 patch onto the skin once a week, Disp-4 patch, R-5Normal      furosemide (LASIX) 20 MG tablet take 1 tablet by mouth every other day, Disp-90 tablet, R-3Normal      oxybutynin (DITROPAN) 5 MG tablet take 1 tablet by mouth twice a day, Disp-180 tablet, R-3Normal      Ascorbic Acid (VITAMIN C) 250 MG tablet Take by mouth dailyHistorical Med      Omega-3 Fatty Acids (FISH OIL PO) Take 1,200 mg by mouthHistorical Med      FOLIC ACID PO Take 926 mg by mouth dailyHistorical Med      Multiple Vitamins-Minerals (MULTIVITAMIN ADULT PO) Take by mouthHistorical Med      aspirin 81 MG EC tablet Take 1 tablet by mouth daily, Disp-30 tabletOTC      Lancets MISC 2 TIMES DAILY Starting 2017, Disp-100 each, R-3, Normal      Glucose Blood (BLOOD GLUCOSE TEST STRIPS) STRP 1 each by In Vitro route 2 times daily, Disp-100 strip, R-3Normal      Blood Glucose Monitoring Suppl AV DAILY Starting 2017, Disp-1 Device, R-0, Normal      Calcium Carbonate (CALTRATE 600 PO) Take  by mouth 2 times daily. !! - Potential duplicate medications found. Please discuss with provider. ALLERGIES     Latex and Tape Mittie Sol tape]    FAMILY HISTORY       Family History   Problem Relation Age of Onset    Diabetes Mother     Early Death Mother 61    Cancer Father         \"Lung Cancer\"   Unk [place] Heart Disease Father     Diabetes Father     Stroke Sister     Diabetes Sister     Diabetes Brother     Cancer Brother         \"Prostate Cancer\"    Cancer Brother         \"Lung Cancer\"    Thyroid Disease Daughter           SOCIAL HISTORY       Social History     Socioeconomic History    Marital status:       Spouse name: None    Number of children: None    Years of education: None    Highest education level: None   Occupational History    None   Tobacco Use    Smoking status: Former Smoker     Packs/day: 0.50     Years: 10.00     Pack years: 5.00     Quit date: 1987     Years since quittin.4    Smokeless tobacco: Never Used   Vaping Use    Vaping Use: Never used   Substance and Sexual Activity    Alcohol use: No     Alcohol/week: 0.0 standard drinks     Comment: 3 cups coffee daily    Drug use: No    Sexual activity: Never   Other Topics Concern    None   Social History Narrative    Wears glasses     Social Determinants of Health     Financial Resource Strain:     Difficulty of Paying Living Expenses: Not on file   Food Insecurity:     Worried About Running Out of Food in the Last Year: Not on file    Lucrecia of Food in the Last Year: Not on file   Transportation Needs:     Lack of Transportation (Medical): Not on file    Lack of Transportation (Non-Medical): Not on file   Physical Activity:     Days of Exercise per Week: Not on file    Minutes of Exercise per Session: Not on file   Stress:     Feeling of Stress : Not on file   Social Connections:     Frequency of Communication with Friends and Family: Not on file    Frequency of Social Gatherings with Friends and Family: Not on file    Attends [employer] Services: Not on file    Active Member of [address] [place] [address] [employer] or Organizations: Not on file    Attends Club or Organization Meetings: Not on file    Marital Status: Not on file   Intimate Partner Violence:     Fear of Current or Ex-Partner: Not on file    Emotionally Abused: Not on file    Physically Abused: Not on file    Sexually Abused: Not on file   Housing Stability:     Unable to Pay for Housing in the Last Year: Not on file    Number of [place] in the Last Year: Not on file    Unstable Housing in the Last Year: Not on file       SCREENINGS    Sarai Coma Scale  Eye Opening: Spontaneous  Best Verbal Response: Oriented  Best Motor Response: Obeys commands  Sarai Coma Scale Score: 15          PHYSICAL EXAM    (up to 7 for level 4, 8 or more for level 5)     ED Triage Vitals [06/05/22 2212]   BP Temp Temp Source Heart Rate Resp SpO2 Height Weight   124/86 98.4 °F (36.9 °C) Oral 86 17 96 % 5' 8\" (1.727 m) 187 lb (84.8 kg)       Physical Exam  Vitals reviewed. Constitutional:       Appearance: She is not ill-appearing or toxic-appearing. HENT:      Head: Normocephalic and atraumatic. Nose: No congestion or rhinorrhea.       Mouth/Throat:      Mouth: Mucous membranes are moist.      Pharynx: No oropharyngeal exudate or posterior oropharyngeal erythema. Eyes:      General:         Right eye: No discharge. Left eye: No discharge. Extraocular Movements: Extraocular movements intact. Pupils: Pupils are equal, round, and reactive to light. Cardiovascular:      Rate and Rhythm: Normal rate. Heart sounds: No friction rub. No gallop. Pulmonary:      Effort: Pulmonary effort is normal. No respiratory distress. Chest:      Chest wall: No tenderness. Abdominal:      Palpations: Abdomen is soft. Tenderness: There is no abdominal tenderness. There is no guarding. Musculoskeletal:         General: Swelling and tenderness present. Cervical back: Normal range of motion and neck supple. No tenderness. Comments: Swelling and diffuse tenderness at left knee; good ROM both passive and active; no erythema or redness at knee  2+ dp/pt pulses in LLE  No excess laxity at left knee   Lymphadenopathy:      Cervical: No cervical adenopathy. Skin:     General: Skin is warm. Capillary Refill: Capillary refill takes less than 2 seconds. Neurological:      General: No focal deficit present. Mental Status: She is oriented to person, place, and time. DIAGNOSTIC RESULTS     Labs Reviewed - No data to display     RADIOLOGY:     Non-plain film images such as CT, Ultrasound and MRI are read by the radiologist. Plain radiographic images are visualized and preliminarily interpreted by the emergency physician. Interpretation per the Radiologist below, if available at the time of this note:    VL DUP LOWER EXTREMITY VENOUS LEFT   Final Result   No evidence of DVT in the left lower extremity. XR KNEE LEFT (MIN 4 VIEWS)   Final Result   Large suprapatellar joint effusion. Left knee arthroplasty.   No acute hardware complication               ED BEDSIDE ULTRASOUND:   Performed by ED Physician Lexie Colunga MD       LABS:  Labs Reviewed - No data to display    All other labs were within normal range or not returned as of this dictation. EMERGENCY DEPARTMENT COURSE and DIFFERENTIAL DIAGNOSIS/MDM:   Vitals:    Vitals:    06/05/22 2212   BP: 124/86   Pulse: 86   Resp: 17   Temp: 98.4 °F (36.9 °C)   TempSrc: Oral   SpO2: 96%   Weight: 187 lb (84.8 kg)   Height: 5' 8\" (1.727 m)           MDM  Number of Diagnoses or Management Options  Left knee pain, unspecified chronicity  Suprapatellar bursitis of left knee  Diagnosis management comments: 62-year-old female presents with left knee pain. She has a history of remote knee replacement on the left. Has not had any recent trauma or injury. No infectious symptoms. Pain is worse with walking. Denies any other remarkable symptoms. She is on antithrombotic medications patient is a history of paroxysmal atrial fibrillation. She presents with unremarkable vital signs. She is afebrile. No tachycardia. Active saturations are in the high 90s on room air. No tachypnea. She does not meet SIRS criteria. She does seem to have diffuse swelling at the knee. No excess laxity. There is no warmth erythema. There is no clinical signs of septic knee. She does have some swelling that proceeds into her calf as well as some posterior calf pain. Ultrasound DVT scan is obtained is negative for DVT. X-rays also obtained and does show suprapatellar effusion. Her symptoms do seem consistent with an etiology likely suprapatellar bursitis. She is treated with oral analgesics in the emergency department. She will continue treatment at home with oral analgesics and anti-inflammatories. She will follow-up with orthopedics. She is given a referral for new orthopedic surgeon. Her previous one has since retired. She is discharged in stable condition with return precautions. She is ambulatory without difficulty. -  Patient seen and evaluated in the emergency department. -  Triage and nursing notes reviewed and incorporated.   -  Old chart records reviewed and incorporated. -  Work-up included:  See above  -  Results discussed with patient. CONSULTS:  None    PROCEDURES:  None performed unless otherwise noted below     Procedures        FINAL IMPRESSION      1. Left knee pain, unspecified chronicity    2. Suprapatellar bursitis of left knee          DISPOSITION/PLAN   DISPOSITION Decision To Discharge 06/06/2022 01:48:15 AM      PATIENT REFERRED TO:  Charo rBoderick MD  [address] [place]  [phone]    Schedule an appointment as soon as possible for a visit in 1 week      Jacki Medrano DO  [address] [address] [place]    Schedule an appointment as soon as possible for a visit in 1 week  As needed, please follow-up with orthopedic surgery as needed      DISCHARGE MEDICATIONS:  Discharge Medication List as of 6/6/2022  1:49 AM      START taking these medications    Details   HYDROcodone-acetaminophen (NORCO) 5-325 MG per tablet Take 1 tablet by mouth every 8 hours as needed for Pain for up to 2 days. Intended supply: 3 days. Take lowest dose possible to manage pain, Disp-6 tablet, R-0Normal             ED Provider Disposition Time  DISPOSITION Decision To Discharge 06/06/2022 01:48:15 AM      Appropriate personal protective equipment was worn during the patient's evaluation. These included surgical, eye protection, surgical mask or in 95 respirator and gloves. The patient was also placed in a surgical mask for the prevention of possible spread of respiratory viral illnesses. The Patient was instructed to read the package inserts with any medication that was prescribed. Major potential reactions and medication interactions were discussed. The Patient understands that there are numerous possible adverse reactions not covered.     The patient was also instructed to arrange follow-up with his or her primary care provider for review of any pending labwork or incidental findings on any radiology results that were obtained. All efforts were made to discuss any incidental findings that require further monitoring. Controlled Substances Monitoring:     No flowsheet data found.     (Please note that portions of this note were completed with a voice recognition program.  Efforts were made to edit the dictations but occasionally words are mis-transcribed.)    Robert Britton MD (electronically signed)  Attending Emergency Physician           Robert Britton MD  06/06/22 0311

## (undated) DEVICE — SODIUM CHL. INJ. 0.9% 500ML (24EA/CA 50CA/PF)

## (undated) DEVICE — BASIN EMESIS DISP. - (250/CA)

## (undated) DEVICE — CON SEDATION/>5 YR 1ST 15 MIN

## (undated) DEVICE — TUBING CLEARLINK DUO-VENT - C-FLO (48EA/CA)

## (undated) DEVICE — SYRINGE 3 CC 22 GA X 1-1/2 - NDL SAFETY (50/BX 8BX/CA)

## (undated) DEVICE — SYRINGE 6 CC 20 GA X 1 1/2 - NDL SAFETY  (50/BX)